# Patient Record
Sex: FEMALE | Race: WHITE | NOT HISPANIC OR LATINO | Employment: FULL TIME | URBAN - METROPOLITAN AREA
[De-identification: names, ages, dates, MRNs, and addresses within clinical notes are randomized per-mention and may not be internally consistent; named-entity substitution may affect disease eponyms.]

---

## 2017-02-08 ENCOUNTER — TELEPHONE (OUTPATIENT)
Dept: ONCOLOGY | Facility: CLINIC | Age: 52
End: 2017-02-08

## 2017-02-08 NOTE — TELEPHONE ENCOUNTER
Left message for patient to reschedule appointment with Dr Kate. Per Dr Kate, she would like patient rescheduled to March 3rd instead of Feb 17th.

## 2017-03-23 DIAGNOSIS — C50.211 MALIGNANT NEOPLASM OF UPPER-INNER QUADRANT OF RIGHT FEMALE BREAST (H): ICD-10-CM

## 2017-03-23 RX ORDER — TAMOXIFEN CITRATE 20 MG/1
20 TABLET ORAL DAILY
Qty: 90 TABLET | Refills: 3 | Status: SHIPPED | OUTPATIENT
Start: 2017-03-23 | End: 2017-07-25

## 2017-04-09 DIAGNOSIS — F41.9 ANXIETY: ICD-10-CM

## 2017-04-10 NOTE — TELEPHONE ENCOUNTER
Lexapro 25 mg      Last Written Prescription Date: 6/28/16  Last Fill Quantity: 90, # refills: 2  Last Office Visit with [facility] primary care provider:  8/23/16        Last PHQ-9 score on record=   PHQ-9 SCORE 8/23/2016   Total Score -   Total Score 0

## 2017-04-11 RX ORDER — ESCITALOPRAM OXALATE 20 MG/1
TABLET ORAL
Qty: 90 TABLET | Refills: 0 | Status: SHIPPED | OUTPATIENT
Start: 2017-04-11 | End: 2017-07-19

## 2017-04-11 NOTE — TELEPHONE ENCOUNTER
Refill approved through [facility] protocol.  Ingrid March RN  [facility]  [phone]  DX IS ANXIETY

## 2017-07-14 DIAGNOSIS — F41.9 ANXIETY: ICD-10-CM

## 2017-07-14 NOTE — TELEPHONE ENCOUNTER
Escitalopram      Last Written Prescription Date: 4/11/17  Last Fill Quantity: 90, # refills: 0  Last Office Visit with [facility] primary care provider:  8/23/16       Last PHQ-9 score on record=   PHQ-9 SCORE 8/23/2016   Total Score -   Total Score 0

## 2017-07-17 ENCOUNTER — MYC MEDICAL ADVICE (OUTPATIENT)
Dept: FAMILY MEDICINE | Facility: CLINIC | Age: 52
End: 2017-07-17

## 2017-07-17 NOTE — TELEPHONE ENCOUNTER
usues a mail order pharmcy that only take s a 3 month supply- send message that we need a local pharmacy to send a 30 day supply to - due for roshni tin August  Called cell listed someone picked up the line and then immediately hung up    /Ingrid March RN  [facility]  [phone]

## 2017-07-19 RX ORDER — ESCITALOPRAM OXALATE 20 MG/1
20 TABLET ORAL DAILY
Qty: 30 TABLET | Refills: 0 | Status: SHIPPED | OUTPATIENT
Start: 2017-07-19 | End: 2017-08-23

## 2017-07-19 RX ORDER — ESCITALOPRAM OXALATE 20 MG/1
TABLET ORAL
Qty: 30 TABLET | Refills: 0 | OUTPATIENT
Start: 2017-07-19

## 2017-07-19 NOTE — TELEPHONE ENCOUNTER
PT WANTS RX SENT TO [facility], PT STATES WOULD LIKE FILLED TODAY

## 2017-07-19 NOTE — TELEPHONE ENCOUNTER
Appointment scheduled for a physical.  30 day supply sent to [facility] per request in separate message.  Ingrid March RN  [facility]  [phone]

## 2017-07-25 ENCOUNTER — ONCOLOGY VISIT (OUTPATIENT)
Dept: ONCOLOGY | Facility: CLINIC | Age: 52
End: 2017-07-25
Attending: INTERNAL MEDICINE
Payer: COMMERCIAL

## 2017-07-25 VITALS
RESPIRATION RATE: 16 BRPM | DIASTOLIC BLOOD PRESSURE: 72 MMHG | OXYGEN SATURATION: 96 % | TEMPERATURE: 99.3 F | BODY MASS INDEX: 24.73 KG/M2 | HEART RATE: 75 BPM | SYSTOLIC BLOOD PRESSURE: 126 MMHG | WEIGHT: 148.6 LBS

## 2017-07-25 DIAGNOSIS — Z17.0 MALIGNANT NEOPLASM OF UPPER-INNER QUADRANT OF RIGHT BREAST IN FEMALE, ESTROGEN RECEPTOR POSITIVE (H): Primary | ICD-10-CM

## 2017-07-25 DIAGNOSIS — C50.211 MALIGNANT NEOPLASM OF UPPER-INNER QUADRANT OF RIGHT BREAST IN FEMALE, ESTROGEN RECEPTOR POSITIVE (H): Primary | ICD-10-CM

## 2017-07-25 DIAGNOSIS — Z12.31 VISIT FOR SCREENING MAMMOGRAM: ICD-10-CM

## 2017-07-25 PROCEDURE — 99211 OFF/OP EST MAY X REQ PHY/QHP: CPT

## 2017-07-25 PROCEDURE — 99214 OFFICE O/P EST MOD 30 MIN: CPT | Performed by: INTERNAL MEDICINE

## 2017-07-25 RX ORDER — TAMOXIFEN CITRATE 20 MG/1
20 TABLET ORAL DAILY
Qty: 90 TABLET | Refills: 3 | Status: SHIPPED | OUTPATIENT
Start: 2017-07-25 | End: 2018-07-10

## 2017-07-25 ASSESSMENT — PAIN SCALES - GENERAL: PAINLEVEL: NO PAIN (0)

## 2017-07-25 NOTE — LETTER
7/25/2017        RE: Piper Lisa  [address] [address]        [facility]    Hematology/Oncology Established Patient Note      Today's Date: 7/25/2017    Reason for Follow-up: Right invasive ductal carcinoma of breast s/p lumpectomy on 9/14/15, grade 1, 1.5 cm size tumor, negative margins, no LVI, negative lymph nodes, ER strongly positive, AK strongly positive, HER2 negative, pT1cN0, stage IA      HISTORY OF PRESENT ILLNESS: Piper Jackson is a 52 year old pre-menopausal female who presented with newly diagnosed right-sided breast cancer.  Piper underwent her regular bilateral screening mammogram on 8/18/15, where a focal asymmetry in the right upper inner breast was found.  She was not having any symptoms at the time.  She denies feeling a breast lump/bump; no rash or nipple discharge.  An ultrasound was done, which found a 1.2 cm heterogenous focal lesion at the 1:00 position, 5 cm from the nipple.       She underwent lumpectomy on 9/14/15, pathology showed grade 1, 1.5 cm size tumor, negative margins, no LVI, negative lymph nodes, ER strongly positive, AK strongly positive, HER2 negative, pT1cN0, stage IA.  Oncotype DX score is 12.  She completed radiation treatment 10/19/15-12/1/15.  She started tamoxifen on 12/5/15.      INTERIM HISTORY: Piper comes in for follow-up today.  She says that she feels very well.  She denies having any new problems.  She continues to take tamoxifen, and the hot flashes have subsided for the most part.  She still gets periods, but have been irregular, so may be maury-menopausal.  She denies any new breast lumps/bumps or new pains.        REVIEW OF SYSTEMS:   14 point ROS was reviewed and is negative other than as noted above in HPI.       HOME MEDICATIONS:  Current Outpatient Prescriptions   Medication Sig Dispense Refill     escitalopram (LEXAPRO) 20 MG tablet Take 1 tablet (20 mg) by mouth daily 30 tablet 0     tamoxifen  (NOLVADEX) 20 MG tablet Take 1 tablet (20 mg) by mouth daily 90 tablet 3     multivitamin, therapeutic with minerals (MULTI-VITAMIN) TABS Take 1 tablet by mouth daily       Calcium Citrate-Vitamin D (CALCIUM + D PO)        SUMAtriptan (IMITREX) 50 MG tablet Take 1 tablet (50 mg) by mouth at onset of headache for migraine May repeat dose in 2 hours if needed, max daily dose is 200 mg 10 tablet 5     escitalopram (LEXAPRO) 20 MG tablet TAKE ONE TABLET BY MOUTH EVERY DAY 90 tablet 2         ALLERGIES:  Allergies   Allergen Reactions     No Known Allergies      No Clinical Screening - See Comments Rash     metal         PAST MEDICAL HISTORY:  Past Medical History:   Diagnosis Date     Generalized anxiety disorder 2002     Hirsutism     facial hair     Migraine          PAST SURGICAL HISTORY:  Past Surgical History:   Procedure Laterality Date     ARTHROSCOPY KNEE RT/LT  2007    right ,degenerative changes joint and meniscus     AS EXC MALIG SKIN LESION TRUNK/ARM/LEG 0.6-1.0 CM      melanoma     BIOPSY NODE SENTINEL Right 9/14/2015    Procedure: BIOPSY NODE SENTINEL;  Surgeon: Eliezer Bates MD;  Location: [facility]     COLONOSCOPY N/A 10/14/2016    Procedure: COLONOSCOPY;  Surgeon: Eliezer Bates MD;  Location:  GI      KNEE SCOPE, DIAGNOSTIC  1985    right, medial meniscus injury     LUMPECTOMY BREAST WITH SEED LOCALIZATION Right 9/14/2015    Procedure: LUMPECTOMY BREAST WITH SEED LOCALIZATION;  Surgeon: Eliezer Bates MD;  Location: [facility]         SOCIAL HISTORY:  Social History     Social History     Marital status:      Spouse name: Jonah     Number of children: 0     Years of education: 18     Occupational History      [employer]     masters in social work     Social History Main Topics     Smoking status: Never Smoker     Smokeless tobacco: Never Used     Alcohol use 1.2 oz/week     2 Standard drinks or equivalent per week      Comment: occasional      Drug use:  "No     Sexual activity: Yes     Partners: Male      Comment: same relationship since , 3 partners lifelong     Other Topics Concern      Service No     Blood Transfusions No     Caffeine Concern No     3 a week     Occupational Exposure No     Hobby Hazards No     Sleep Concern No     6 hrs     Stress Concern No     low     Weight Concern No     Special Diet No     calcium/ high milk intake 2-3 glasses per day     Back Care Yes     lower back aches     Exercise Yes     running/weights 50-60 min 5-6 days per week     Bike Helmet Yes     Seat Belt Yes     Self-Exams No     Social History Narrative    Lives with .  Desires no children. Has a dog.  She works as a .         FAMILY HISTORY:  Family History   Problem Relation Age of Onset     CANCER Father      ocular melanoma,  of metastatic diseas age 81     Hypertension Father      Lipids Father      Prostate Cancer Father      onset age 70     GASTROINTESTINAL DISEASE Father      \"sensitive stomach\"     Skin Cancer Father      GASTROINTESTINAL DISEASE Mother      cholecystectomy     Eye Disorder Mother      cataract, macular degeneration     Hypertension Brother      C.A.D. Maternal Grandmother       of MI age 83     C.A.D. Maternal Grandfather       MI early 60's     C.A.D. Paternal Grandfather       early 50s MI     Breast Cancer Paternal Grandmother       mid 70s         PHYSICAL EXAM:  Vital signs:  /72 (BP Location: Right arm, Patient Position: Sitting, Cuff Size: Adult Regular)  Pulse 75  Temp 99.3  F (37.4  C) (Oral)  Resp 16  Wt 67.4 kg (148 lb 9.6 oz)  SpO2 96%  BMI 24.73 kg/m2   ECO  GENERAL/CONSTITUTIONAL: No acute distress.  EYES: No scleral icterus.  LYMPH: No anterior cervical, posterior cervical, supraclavicular, or axillary adenopathy.   RESPIRATORY: Clear to auscultation bilaterally. No crackles or wheezing.   CARDIOVASCULAR: Regular rate and rhythm without murmurs, gallops, or " rubs.  GASTROINTESTINAL: No tenderness. The patient has normal bowel sounds. No guarding.  No distention.  BREAST: Right-s/p lumpectomy with slight firmness of scar tissue around the site; otherwise, no palpable mass, discharge, rash, or axillary lymphadenopathy;  Left-no palpable mass, discharge, rash, or axillary lymphadenopathy.   MUSCULOSKELETAL: Warm and well-perfused, no cyanosis, clubbing, or edema.  NEUROLOGIC: Alert, oriented, answers questions appropriately.  INTEGUMENTARY: No rashes or jaundice.  GAIT: Steady, does not use assistive device      LABS:  None today.      IMAGING:  Bilateral mammogram 8/23/16:  BREAST DENSITY: Heterogeneously dense.     COMMENTS: Breast conservation therapy changes are seen in the right  breast.  No concerning findings in either breast.                                                                       IMPRESSION: BI-RADS CATEGORY: 2 - Benign Finding(s).      ASSESSMENT/PLAN:  Piper Jackson is a 52 year old pre-menopausal, otherwise healthy, female:    1) Invasive ductal carcinoma of right upper inner breast: s/p lumpectomy on 9/14/15, pathology showed grade 1, 1.5 cm size tumor, negative margins, no LVI, negative lymph nodes, ER strongly positive, ND strongly positive, HER2 negative, pT1cN0, stage IA. Oncotype DX score is 12.  She completed radiation 10/19/15-12/1/15.  She started tamoxifen on 12/5/15.      There has been no evidence of recurrence on exam.  She is tolerating tamoxifen well and will continue.  She is due for her mammogram next month.    -Continue tamoxifen - plan to treat for 10 years, if can tolerate  -RTC in 6 months  -next bilateral screening mammogram in August 2017 - ordered today    2) Melanoma in situ: s/p excision at left lateral lower leg  -continue follow-up with dermatology    3) Colon screening: She had screening colonoscopy 10/14/16.  It was normal.  Next colonoscopy was recommended for 10 year from then.      I spent a total of 25 minutes  "with the patient, with over >50% of the time in counseling and/or coordination of care.      Fabiola Kate MD  Hematology/Oncology  [facility]        Oncology Rooming Note    July 25, 2017 8:41 AM   Piper Lisa is a 52 year old female who presents for:    Chief Complaint   Patient presents with     Oncology Clinic Visit     History of melanoma in situ     Initial Vitals: /72 (BP Location: Right arm, Patient Position: Sitting, Cuff Size: Adult Regular)  Pulse 75  Temp 99.3  F (37.4  C) (Oral)  Resp 16  Wt 67.4 kg (148 lb 9.6 oz)  SpO2 96%  BMI 24.73 kg/m2 Estimated body mass index is 24.73 kg/(m^2) as calculated from the following:    Height as of 10/14/16: 1.651 m (5' 5\").    Weight as of this encounter: 67.4 kg (148 lb 9.6 oz). Body surface area is 1.76 meters squared.  No Pain (0) Comment: Data Unavailable   No LMP recorded.  Allergies reviewed: Yes  Medications reviewed: Yes    Medications: MEDICATION REFILL NEEDED ON TAMOXIFEN    Pharmacy name entered into [facility]:    [facility] - [place], [place] - [facility] [address]     Clinical concerns: None          5 minutes for nursing intake (face to face time)     Lilliam Schmid MA                Sincerely,        Fabiola Kate MD    "

## 2017-07-25 NOTE — PROGRESS NOTES
"Oncology Rooming Note    July 25, 2017 8:41 AM   Piper Lisa is a 52 year old female who presents for:    Chief Complaint   Patient presents with     Oncology Clinic Visit     History of melanoma in situ     Initial Vitals: /72 (BP Location: Right arm, Patient Position: Sitting, Cuff Size: Adult Regular)  Pulse 75  Temp 99.3  F (37.4  C) (Oral)  Resp 16  Wt 67.4 kg (148 lb 9.6 oz)  SpO2 96%  BMI 24.73 kg/m2 Estimated body mass index is 24.73 kg/(m^2) as calculated from the following:    Height as of 10/14/16: 1.651 m (5' 5\").    Weight as of this encounter: 67.4 kg (148 lb 9.6 oz). Body surface area is 1.76 meters squared.  No Pain (0) Comment: Data Unavailable   No LMP recorded.  Allergies reviewed: Yes  Medications reviewed: Yes    Medications: MEDICATION REFILL NEEDED ON TAMOXIFEN    Pharmacy name entered into [facility]:    [facility] - [place], [place] - [facility] [address]    Clinical concerns: None          5 minutes for nursing intake (face to face time)     Lilliam Schmid MA    DISCHARGE PLAN:  Next appointments: See patient instruction section.  Brought the patient to the [facility] for scheduling.  Departure Mode: Ambulatory  Accompanied by: self  4 minutes for nursing discharge (face to face time)   Lilliam Schmid MA    "

## 2017-07-25 NOTE — MR AVS SNAPSHOT
"              After Visit Summary   7/25/2017    Piper Lisa    MRN: [id number]           Patient Information     Date Of Birth          1965        Visit Information        Provider Department      7/25/2017 8:30 AM Fabiola Kate MD [facility]        Today's Diagnoses     Malignant neoplasm of upper-inner quadrant of right breast in female, estrogen receptor positive (H)    -  1    Visit for screening mammogram          Care Instructions    -schedule mammogram in August 2017  -continue tamoxifen- ADITI Gallo sent Rx to the [place] mail order pharmacy  -return to clinic in 6 months          Follow-ups after your visit        Your next 10 appointments already scheduled     Aug 23, 2017  9:20 AM CDT   PHYSICAL with Maribel Serna MD   [facility] ([facility])    [address] [place] [id number] [phone]            Aug 25, 2017 10:00 AM CDT   MA SCREENING DIGITAL BILATERAL with [id number]   [facility] [place] ([facility])    [address] [place] [address], [address] [id number] [phone]           Do not use any powder, lotion or deodorant under your arms or on your breast. If you do, we will ask you to remove it before your exam.  Wear comfortable, two-piece clothing.  If you have any allergies, tell your care team.  Bring any previous mammograms from other facilities or have them mailed to the breast center. Three-dimensional (3D) mammograms are available at [place] locations in [facility], and [place]. [facility] locations include [place] and [facility] in [place]. Benefits of 3D mammograms include: - Improved rate of cancer detection - Decreases your chance of having to go back for more tests, which means fewer: - \"False-positive\" results (This means that there is an abnormal area but it isn't cancer.) - Invasive testing " procedures, such as a biopsy or surgery - Can provide clearer images of the breast if you have dense breast tissue. 3D mammography is an optional exam that anyone can have with a 2D mammogram. It doesn't replace or take the place of a 2D mammogram. 2D mammograms remain an effective screening test for all women.  Not all insurance companies cover the cost of a 3D mammogram. Check with your insurance.            Jan 12, 2018  2:30 PM CST   Return Visit with Fabiola Kate MD   [facility] ([facility])    [facility]  [address]  [facility] [id number] [phone]              Future tests that were ordered for you today     Open Future Orders        Priority Expected Expires Ordered    *MA Screening Digital Bilateral Routine 8/24/2017 7/25/2018 7/25/2017            Who to contact     If you have questions or need follow up information about today's clinic visit or your schedule please contact [facility] directly at [phone].  Normal or non-critical lab and imaging results will be communicated to you by [employer], letter or phone within 4 business days after the clinic has received the results. If you do not hear from us within 7 days, please contact the clinic through [employer] or phone. If you have a critical or abnormal lab result, we will notify you by phone as soon as possible.  Submit refill requests through [employer] or call your pharmacy and they will forward the refill request to us. Please allow 3 business days for your refill to be completed.          Additional Information About Your Visit        [employer] Information     [employer] gives you secure access to your electronic health record. If you see a primary care provider, you can also send messages to your care team and make appointments. If you have questions, please call your primary care clinic.  If you do not have a primary care provider, please call [phone] and they  will assist you.        Care EveryWhere ID     This is your Care EveryWhere ID. This could be used by other organizations to access your [place] medical records  [id number]        Your Vitals Were     Pulse Temperature Respirations Pulse Oximetry BMI (Body Mass Index)       75 99.3  F (37.4  C) (Oral) 16 96% 24.73 kg/m2        Blood Pressure from Last 3 Encounters:   07/25/17 126/72   10/14/16 99/64   10/14/16 110/74    Weight from Last 3 Encounters:   07/25/17 67.4 kg (148 lb 9.6 oz)   10/14/16 65.8 kg (145 lb)   10/14/16 68 kg (150 lb)                 Today's Medication Changes          These changes are accurate as of: 7/25/17  9:21 AM.  If you have any questions, ask your nurse or doctor.               Stop taking these medicines if you haven't already. Please contact your care team if you have questions.     vitamin D 1000 UNITS capsule                Where to get your medicines      These medications were sent to [facility] - [place], [place] - [address] [place] [address], [facility] [id number]    Hours:  Mon-Fri 8:30am-5:00pm Toll Free [phone] Phone:  [phone]     tamoxifen 20 MG tablet                Primary Care Provider Office Phone # Fax #    Maribel Serna [phone]       [facility] [place] [address] [place] [address] [address]  [place] [address]        Equal Access to Services     YEMI KRUEGER AH: Hadii pelon ku hadasho Soomaali, waaxda luqadaha, qaybta kaalmada adeegyada, bam miller. So [facility] [phone].    ATENCIÓN: Si habla español, tiene a salazar disposición servicios gratuitos de asistencia lingüística. Llame al [phone].    We comply with applicable federal civil rights laws and [place] laws. We do not discriminate on the basis of race, color, national origin, age, disability sex, sexual orientation or gender identity.            Thank you!     Thank you for choosing [facility]  for your  care. Our goal is always to provide you with excellent care. Hearing back from our patients is one way we can continue to improve our services. Please take a few minutes to complete the written survey that you may receive in the mail after your visit with us. Thank you!             Your Updated Medication List - Protect others around you: Learn how to safely use, store and throw away your medicines at www.disposemymeds.org.          This list is accurate as of: 7/25/17  9:21 AM.  Always use your most recent med list.                   Brand Name Dispense Instructions for use Diagnosis    CALCIUM + D PO           * escitalopram 20 MG tablet    LEXAPRO    90 tablet    TAKE ONE TABLET BY MOUTH EVERY DAY    Generalized anxiety disorder       * escitalopram 20 MG tablet    LEXAPRO    30 tablet    Take 1 tablet (20 mg) by mouth daily    Anxiety       Multi-vitamin Tabs tablet      Take 1 tablet by mouth daily        SUMAtriptan 50 MG tablet    IMITREX    10 tablet    Take 1 tablet (50 mg) by mouth at onset of headache for migraine May repeat dose in 2 hours if needed, max daily dose is 200 mg    Migraine without aura and without status migrainosus, not intractable       tamoxifen 20 MG tablet    NOLVADEX    90 tablet    Take 1 tablet (20 mg) by mouth daily    Malignant neoplasm of upper-inner quadrant of right breast in female, estrogen receptor positive (H)       * Notice:  This list has 2 medication(s) that are the same as other medications prescribed for you. Read the directions carefully, and ask your doctor or other care provider to review them with you.

## 2017-07-25 NOTE — PROGRESS NOTES
[facility]    Hematology/Oncology Established Patient Note      Today's Date: 7/25/2017    Reason for Follow-up: Right invasive ductal carcinoma of breast s/p lumpectomy on 9/14/15, grade 1, 1.5 cm size tumor, negative margins, no LVI, negative lymph nodes, ER strongly positive, MI strongly positive, HER2 negative, pT1cN0, stage IA      HISTORY OF PRESENT ILLNESS: Piper Jackson is a 52 year old pre-menopausal female who presented with newly diagnosed right-sided breast cancer.  Piper underwent her regular bilateral screening mammogram on 8/18/15, where a focal asymmetry in the right upper inner breast was found.  She was not having any symptoms at the time.  She denies feeling a breast lump/bump; no rash or nipple discharge.  An ultrasound was done, which found a 1.2 cm heterogenous focal lesion at the 1:00 position, 5 cm from the nipple.       She underwent lumpectomy on 9/14/15, pathology showed grade 1, 1.5 cm size tumor, negative margins, no LVI, negative lymph nodes, ER strongly positive, MI strongly positive, HER2 negative, pT1cN0, stage IA.  Oncotype DX score is 12.  She completed radiation treatment 10/19/15-12/1/15.  She started tamoxifen on 12/5/15.      INTERIM HISTORY: Piper comes in for follow-up today.  She says that she feels very well.  She denies having any new problems.  She continues to take tamoxifen, and the hot flashes have subsided for the most part.  She still gets periods, but have been irregular, so may be maury-menopausal.  She denies any new breast lumps/bumps or new pains.        REVIEW OF SYSTEMS:   14 point ROS was reviewed and is negative other than as noted above in HPI.       HOME MEDICATIONS:  Current Outpatient Prescriptions   Medication Sig Dispense Refill     escitalopram (LEXAPRO) 20 MG tablet Take 1 tablet (20 mg) by mouth daily 30 tablet 0     tamoxifen (NOLVADEX) 20 MG tablet Take 1 tablet (20 mg) by mouth daily 90 tablet 3     multivitamin, therapeutic  with minerals (MULTI-VITAMIN) TABS Take 1 tablet by mouth daily       Calcium Citrate-Vitamin D (CALCIUM + D PO)        SUMAtriptan (IMITREX) 50 MG tablet Take 1 tablet (50 mg) by mouth at onset of headache for migraine May repeat dose in 2 hours if needed, max daily dose is 200 mg 10 tablet 5     escitalopram (LEXAPRO) 20 MG tablet TAKE ONE TABLET BY MOUTH EVERY DAY 90 tablet 2         ALLERGIES:  Allergies   Allergen Reactions     No Known Allergies      No Clinical Screening - See Comments Rash     metal         PAST MEDICAL HISTORY:  Past Medical History:   Diagnosis Date     Generalized anxiety disorder 2002     Hirsutism     facial hair     Migraine          PAST SURGICAL HISTORY:  Past Surgical History:   Procedure Laterality Date     ARTHROSCOPY KNEE RT/LT  2007    right ,degenerative changes joint and meniscus     AS EXC MALIG SKIN LESION TRUNK/ARM/LEG 0.6-1.0 CM      melanoma     BIOPSY NODE SENTINEL Right 9/14/2015    Procedure: BIOPSY NODE SENTINEL;  Surgeon: Eliezer Bates MD;  Location: [facility]     COLONOSCOPY N/A 10/14/2016    Procedure: COLONOSCOPY;  Surgeon: Eliezer Bates MD;  Location:  GI      KNEE SCOPE, DIAGNOSTIC  1985    right, medial meniscus injury     LUMPECTOMY BREAST WITH SEED LOCALIZATION Right 9/14/2015    Procedure: LUMPECTOMY BREAST WITH SEED LOCALIZATION;  Surgeon: Eliezer Bates MD;  Location: [facility]         SOCIAL HISTORY:  Social History     Social History     Marital status:      Spouse name: Jonah     Number of children: 0     Years of education: 18     Occupational History      [employer]     masters in social work     Social History Main Topics     Smoking status: Never Smoker     Smokeless tobacco: Never Used     Alcohol use 1.2 oz/week     2 Standard drinks or equivalent per week      Comment: occasional      Drug use: No     Sexual activity: Yes     Partners: Male      Comment: same relationship since 2000, 3 partners  "lifelong     Other Topics Concern      Service No     Blood Transfusions No     Caffeine Concern No     3 a week     Occupational Exposure No     Hobby Hazards No     Sleep Concern No     6 hrs     Stress Concern No     low     Weight Concern No     Special Diet No     calcium/ high milk intake 2-3 glasses per day     Back Care Yes     lower back aches     Exercise Yes     running/weights 50-60 min 5-6 days per week     Bike Helmet Yes     Seat Belt Yes     Self-Exams No     Social History Narrative    Lives with .  Desires no children. Has a dog.  She works as a .         FAMILY HISTORY:  Family History   Problem Relation Age of Onset     CANCER Father      ocular melanoma,  of metastatic diseas age 81     Hypertension Father      Lipids Father      Prostate Cancer Father      onset age 70     GASTROINTESTINAL DISEASE Father      \"sensitive stomach\"     Skin Cancer Father      GASTROINTESTINAL DISEASE Mother      cholecystectomy     Eye Disorder Mother      cataract, macular degeneration     Hypertension Brother      C.A.D. Maternal Grandmother       of MI age 83     C.A.D. Maternal Grandfather       MI early 60's     C.A.D. Paternal Grandfather       early 50s MI     Breast Cancer Paternal Grandmother       mid 70s         PHYSICAL EXAM:  Vital signs:  /72 (BP Location: Right arm, Patient Position: Sitting, Cuff Size: Adult Regular)  Pulse 75  Temp 99.3  F (37.4  C) (Oral)  Resp 16  Wt 67.4 kg (148 lb 9.6 oz)  SpO2 96%  BMI 24.73 kg/m2   ECO  GENERAL/CONSTITUTIONAL: No acute distress.  EYES: No scleral icterus.  LYMPH: No anterior cervical, posterior cervical, supraclavicular, or axillary adenopathy.   RESPIRATORY: Clear to auscultation bilaterally. No crackles or wheezing.   CARDIOVASCULAR: Regular rate and rhythm without murmurs, gallops, or rubs.  GASTROINTESTINAL: No tenderness. The patient has normal bowel sounds. No guarding.  No " distention.  BREAST: Right-s/p lumpectomy with slight firmness of scar tissue around the site; otherwise, no palpable mass, discharge, rash, or axillary lymphadenopathy;  Left-no palpable mass, discharge, rash, or axillary lymphadenopathy.   MUSCULOSKELETAL: Warm and well-perfused, no cyanosis, clubbing, or edema.  NEUROLOGIC: Alert, oriented, answers questions appropriately.  INTEGUMENTARY: No rashes or jaundice.  GAIT: Steady, does not use assistive device      LABS:  None today.      IMAGING:  Bilateral mammogram 8/23/16:  BREAST DENSITY: Heterogeneously dense.     COMMENTS: Breast conservation therapy changes are seen in the right  breast.  No concerning findings in either breast.                                                                       IMPRESSION: BI-RADS CATEGORY: 2 - Benign Finding(s).      ASSESSMENT/PLAN:  Piper Jackson is a 52 year old pre-menopausal, otherwise healthy, female:    1) Invasive ductal carcinoma of right upper inner breast: s/p lumpectomy on 9/14/15, pathology showed grade 1, 1.5 cm size tumor, negative margins, no LVI, negative lymph nodes, ER strongly positive, MA strongly positive, HER2 negative, pT1cN0, stage IA. Oncotype DX score is 12.  She completed radiation 10/19/15-12/1/15.  She started tamoxifen on 12/5/15.      There has been no evidence of recurrence on exam.  She is tolerating tamoxifen well and will continue.  She is due for her mammogram next month.    -Continue tamoxifen - plan to treat for 10 years, if can tolerate  -RTC in 6 months  -next bilateral screening mammogram in August 2017 - ordered today    2) Melanoma in situ: s/p excision at left lateral lower leg  -continue follow-up with dermatology    3) Colon screening: She had screening colonoscopy 10/14/16.  It was normal.  Next colonoscopy was recommended for 10 year from then.      I spent a total of 25 minutes with the patient, with over >50% of the time in counseling and/or coordination of  care.      Fabiola Kate MD  Hematology/Oncology  [facility]

## 2017-07-25 NOTE — PATIENT INSTRUCTIONS
-schedule mammogram in August 2017  Scheduled/janice  -continue tamoxifen- ADITI Gallo sent Rx to the [facility] mail order pharmacy  -return to clinic in 6 months  Scheduled/janice      AVS printed & given to patient/Lili

## 2017-08-23 ENCOUNTER — OFFICE VISIT (OUTPATIENT)
Dept: FAMILY MEDICINE | Facility: CLINIC | Age: 52
End: 2017-08-23
Payer: COMMERCIAL

## 2017-08-23 VITALS
DIASTOLIC BLOOD PRESSURE: 62 MMHG | WEIGHT: 152.6 LBS | HEART RATE: 76 BPM | HEIGHT: 65 IN | OXYGEN SATURATION: 98 % | SYSTOLIC BLOOD PRESSURE: 110 MMHG | TEMPERATURE: 98.4 F | BODY MASS INDEX: 25.43 KG/M2

## 2017-08-23 DIAGNOSIS — F41.1 GENERALIZED ANXIETY DISORDER: ICD-10-CM

## 2017-08-23 DIAGNOSIS — G43.009 MIGRAINE WITHOUT AURA AND WITHOUT STATUS MIGRAINOSUS, NOT INTRACTABLE: ICD-10-CM

## 2017-08-23 DIAGNOSIS — Z23 NEED FOR VACCINATION: ICD-10-CM

## 2017-08-23 DIAGNOSIS — Z00.00 ROUTINE GENERAL MEDICAL EXAMINATION AT A HEALTH CARE FACILITY: Primary | ICD-10-CM

## 2017-08-23 LAB
ALBUMIN SERPL-MCNC: 3.8 G/DL (ref 3.4–5)
ALP SERPL-CCNC: 50 U/L (ref 40–150)
ALT SERPL W P-5'-P-CCNC: 28 U/L (ref 0–50)
ANION GAP SERPL CALCULATED.3IONS-SCNC: 9 MMOL/L (ref 3–14)
AST SERPL W P-5'-P-CCNC: 23 U/L (ref 0–45)
BILIRUB SERPL-MCNC: 0.3 MG/DL (ref 0.2–1.3)
BUN SERPL-MCNC: 14 MG/DL (ref 7–30)
CALCIUM SERPL-MCNC: 9.3 MG/DL (ref 8.5–10.1)
CHLORIDE SERPL-SCNC: 105 MMOL/L (ref 94–109)
CHOLEST SERPL-MCNC: 182 MG/DL
CO2 SERPL-SCNC: 27 MMOL/L (ref 20–32)
CREAT SERPL-MCNC: 0.97 MG/DL (ref 0.52–1.04)
GFR SERPL CREATININE-BSD FRML MDRD: 60 ML/MIN/1.7M2
GLUCOSE SERPL-MCNC: 78 MG/DL (ref 70–99)
HDLC SERPL-MCNC: 75 MG/DL
HGB BLD-MCNC: 12.8 G/DL (ref 11.7–15.7)
LDLC SERPL CALC-MCNC: 84 MG/DL
NONHDLC SERPL-MCNC: 107 MG/DL
POTASSIUM SERPL-SCNC: 4.2 MMOL/L (ref 3.4–5.3)
PROT SERPL-MCNC: 7.3 G/DL (ref 6.8–8.8)
SODIUM SERPL-SCNC: 141 MMOL/L (ref 133–144)
TRIGL SERPL-MCNC: 117 MG/DL
TSH SERPL DL<=0.005 MIU/L-ACNC: 1 MU/L (ref 0.4–4)

## 2017-08-23 PROCEDURE — 87624 HPV HI-RISK TYP POOLED RSLT: CPT | Performed by: FAMILY MEDICINE

## 2017-08-23 PROCEDURE — 90714 TD VACC NO PRESV 7 YRS+ IM: CPT | Performed by: FAMILY MEDICINE

## 2017-08-23 PROCEDURE — 80061 LIPID PANEL: CPT | Performed by: FAMILY MEDICINE

## 2017-08-23 PROCEDURE — 36415 COLL VENOUS BLD VENIPUNCTURE: CPT | Performed by: FAMILY MEDICINE

## 2017-08-23 PROCEDURE — 85018 HEMOGLOBIN: CPT | Performed by: FAMILY MEDICINE

## 2017-08-23 PROCEDURE — G0145 SCR C/V CYTO,THINLAYER,RESCR: HCPCS | Performed by: FAMILY MEDICINE

## 2017-08-23 PROCEDURE — 80053 COMPREHEN METABOLIC PANEL: CPT | Performed by: FAMILY MEDICINE

## 2017-08-23 PROCEDURE — 84443 ASSAY THYROID STIM HORMONE: CPT | Performed by: FAMILY MEDICINE

## 2017-08-23 PROCEDURE — 90471 IMMUNIZATION ADMIN: CPT | Performed by: FAMILY MEDICINE

## 2017-08-23 PROCEDURE — 99396 PREV VISIT EST AGE 40-64: CPT | Mod: 25 | Performed by: FAMILY MEDICINE

## 2017-08-23 RX ORDER — SUMATRIPTAN 50 MG/1
50 TABLET, FILM COATED ORAL
Qty: 10 TABLET | Refills: 5 | Status: SHIPPED | OUTPATIENT
Start: 2017-08-23 | End: 2019-02-12

## 2017-08-23 RX ORDER — ESCITALOPRAM OXALATE 10 MG/1
10 TABLET ORAL DAILY
Qty: 90 TABLET | Refills: 1 | Status: SHIPPED | OUTPATIENT
Start: 2017-08-23 | End: 2018-07-31

## 2017-08-23 RX ORDER — ESCITALOPRAM OXALATE 20 MG/1
20 TABLET ORAL DAILY
Qty: 90 TABLET | Refills: 1 | Status: SHIPPED | OUTPATIENT
Start: 2017-08-23 | End: 2017-08-23

## 2017-08-23 ASSESSMENT — ANXIETY QUESTIONNAIRES
IF YOU CHECKED OFF ANY PROBLEMS ON THIS QUESTIONNAIRE, HOW DIFFICULT HAVE THESE PROBLEMS MADE IT FOR YOU TO DO YOUR WORK, TAKE CARE OF THINGS AT HOME, OR GET ALONG WITH OTHER PEOPLE: NOT DIFFICULT AT ALL
2. NOT BEING ABLE TO STOP OR CONTROL WORRYING: NOT AT ALL
3. WORRYING TOO MUCH ABOUT DIFFERENT THINGS: NOT AT ALL
7. FEELING AFRAID AS IF SOMETHING AWFUL MIGHT HAPPEN: NOT AT ALL
GAD7 TOTAL SCORE: 0
5. BEING SO RESTLESS THAT IT IS HARD TO SIT STILL: NOT AT ALL
6. BECOMING EASILY ANNOYED OR IRRITABLE: NOT AT ALL
1. FEELING NERVOUS, ANXIOUS, OR ON EDGE: NOT AT ALL

## 2017-08-23 ASSESSMENT — PATIENT HEALTH QUESTIONNAIRE - PHQ9
5. POOR APPETITE OR OVEREATING: NOT AT ALL
SUM OF ALL RESPONSES TO PHQ QUESTIONS 1-9: 1

## 2017-08-23 NOTE — PROGRESS NOTES
SUBJECTIVE:   CC: Piper Lisa is an 52 year old woman who presents for preventive health visit.     Healthy Habits:    Do you get at least three servings of calcium containing foods daily (dairy, green leafy vegetables, etc.)? yes    Amount of exercise or daily activities, outside of work: 5-7 day(s) per week    Problems taking medications regularly No    Medication side effects: No    Have you had an eye exam in the past two years? no    Do you see a dentist twice per year? yes    Do you have sleep apnea, excessive snoring or daytime drowsiness?no        Anxiety Follow-Up    Status since last visit: Improved     Other associated symptoms:None    Complicating factors:   Significant life event: No   Current substance abuse: None  Depression symptoms: No  DAVID-7 SCORE 12/23/2015 8/23/2016 8/23/2017   Total Score - - -   Total Score 3 0 0       GAD7          Migraine Follow-Up    Headaches symptoms:  Stable     Frequency: rare       Able to do normal daily activities/work with migraines: Yes    Rescue/Relief medication:sumatriptan (Imitrex)              Effectiveness: moderate relief    Preventative medication: None    Neurologic complications: No new stroke-like symptoms, loss of vision or speech, numbness or weakness    In the past 4 weeks, how often have you gone to Urgent Care or the emergency room because of your headaches?  0          Today's PHQ-2 Score: PHQ-2 ( 1999 Pfizer) 8/23/2016 12/23/2015   Q1: Little interest or pleasure in doing things 0 0   Q2: Feeling down, depressed or hopeless 0 0   PHQ-2 Score 0 0       Abuse: Current or Past(Physical, Sexual or Emotional)- No  Do you feel safe in your environment - Yes    Social History   Substance Use Topics     Smoking status: Never Smoker     Smokeless tobacco: Never Used     Alcohol use 1.2 oz/week     2 Standard drinks or equivalent per week      Comment: occasional      The patient does not drink >3 drinks per day nor >7 drinks per week.    Reviewed  "orders with patient.  Reviewed health maintenance and updated orders accordingly - Yes  Labs reviewed in EPIC  Patient Active Problem List   Diagnosis     Hirsutism     Generalized anxiety disorder     Migraine     CARDIOVASCULAR SCREENING; LDL GOAL LESS THAN 160     Lentigo maligna (H)     Malignant neoplasm of upper-inner quadrant of right female breast (H)     History of melanoma in situ     Past Surgical History:   Procedure Laterality Date     ARTHROSCOPY KNEE RT/LT      right ,degenerative changes joint and meniscus     AS EXC MALIG SKIN LESION TRUNK/ARM/LEG 0.6-1.0 CM      melanoma     BIOPSY NODE SENTINEL Right 2015    Procedure: BIOPSY NODE SENTINEL;  Surgeon: Eliezer Bates MD;  Location: [facility]     COLONOSCOPY N/A 10/14/2016    Procedure: COLONOSCOPY;  Surgeon: Elieezr Bates MD;  Location:  GI      KNEE SCOPE, DIAGNOSTIC      right, medial meniscus injury     LUMPECTOMY BREAST WITH SEED LOCALIZATION Right 2015    Procedure: LUMPECTOMY BREAST WITH SEED LOCALIZATION;  Surgeon: Eliezer Bates MD;  Location: [facility]       Social History   Substance Use Topics     Smoking status: Never Smoker     Smokeless tobacco: Never Used     Alcohol use 1.2 oz/week     2 Standard drinks or equivalent per week      Comment: occasional      Family History   Problem Relation Age of Onset     CANCER Father      ocular melanoma,  of metastatic diseas age 81     Hypertension Father      Lipids Father      Prostate Cancer Father      onset age 70     GASTROINTESTINAL DISEASE Father      \"sensitive stomach\"     Skin Cancer Father      GASTROINTESTINAL DISEASE Mother      cholecystectomy     Eye Disorder Mother      cataract, macular degeneration     CEREBROVASCULAR DISEASE Mother 85     C.A.D. Maternal Grandmother       of MI age 83     C.A.D. Maternal Grandfather       MI early 60's     C.A.D. Paternal Grandfather       early 50s MI     Breast Cancer Paternal Grandmother  "      mid 70s     Hypertension Brother          Current Outpatient Prescriptions   Medication Sig Dispense Refill     SUMAtriptan (IMITREX) 50 MG tablet Take 1 tablet (50 mg) by mouth at onset of headache for migraine May repeat dose in 2 hours if needed, max daily dose is 200 mg 10 tablet 5     escitalopram (LEXAPRO) 10 MG tablet Take 1 tablet (10 mg) by mouth daily 90 tablet 1     tamoxifen (NOLVADEX) 20 MG tablet Take 1 tablet (20 mg) by mouth daily 90 tablet 3     multivitamin, therapeutic with minerals (MULTI-VITAMIN) TABS Take 1 tablet by mouth daily       Calcium Citrate-Vitamin D (CALCIUM + D PO)        [DISCONTINUED] escitalopram (LEXAPRO) 20 MG tablet Take 1 tablet (20 mg) by mouth daily 90 tablet 1     [DISCONTINUED] escitalopram (LEXAPRO) 20 MG tablet Take 1 tablet (20 mg) by mouth daily (Patient not taking: Reported on 2017) 30 tablet 0     [DISCONTINUED] SUMAtriptan (IMITREX) 50 MG tablet Take 1 tablet (50 mg) by mouth at onset of headache for migraine May repeat dose in 2 hours if needed, max daily dose is 200 mg 10 tablet 5     [DISCONTINUED] escitalopram (LEXAPRO) 20 MG tablet TAKE ONE TABLET BY MOUTH EVERY DAY 90 tablet 2     Allergies   Allergen Reactions     No Known Allergies      No Clinical Screening - See Comments Rash     metal         Patient over age 50, mutual decision to screen reflected in health maintenance.      Pertinent mammograms are reviewed under the imaging tab.  History of abnormal Pap smear: NO - age 30- 65 PAP every 3 years recommended    Reviewed and updated as needed this visit by clinical staffTobacco  Allergies  Meds         Reviewed and updated as needed this visit by Provider              ROS:  C: NEGATIVE for fever, chills, change in weight  I: NEGATIVE for worrisome rashes, moles or lesions  E: NEGATIVE for vision changes or irritation  ENT: NEGATIVE for ear, mouth and throat problems  R: NEGATIVE for significant cough or SOB  B: NEGATIVE for masses,  "tenderness or discharge  CV: NEGATIVE for chest pain, palpitations or peripheral edema  GI: NEGATIVE for nausea, abdominal pain, heartburn, or change in bowel habits  : NEGATIVE for unusual urinary or vaginal symptoms. Periods are regular.  M: NEGATIVE for significant arthralgias or myalgia  N: NEGATIVE for weakness, dizziness or paresthesias  P: NEGATIVE for changes in mood or affect    OBJECTIVE:   /62  Pulse 76  Temp 98.4  F (36.9  C) (Tympanic)  Ht 5' 5\" (1.651 m)  Wt 152 lb 9.6 oz (69.2 kg)  LMP  (LMP Unknown)  SpO2 98%  BMI 25.39 kg/m2  EXAM:  GENERAL: healthy, alert and no distress  EYES: Eyes grossly normal to inspection, PERRL and conjunctivae and sclerae normal  HENT: ear canals and TM's normal, nose and mouth without ulcers or lesions  NECK: no adenopathy, no asymmetry, masses, or scars and thyroid normal to palpation  RESP: lungs clear to auscultation - no rales, rhonchi or wheezes  BREAST: normal without masses, tenderness or nipple discharge and no palpable axillary masses or adenopathy  CV: regular rate and rhythm, normal S1 S2, no S3 or S4, no murmur, click or rub, no peripheral edema and peripheral pulses strong  ABDOMEN: soft, nontender, no hepatosplenomegaly, no masses and bowel sounds normal   (female): normal female external genitalia, normal urethral meatus, vaginal mucosa pink, moist, well rugated, and normal cervix/adnexa/uterus without masses or discharge  MS: no gross musculoskeletal defects noted, no edema  SKIN: no suspicious lesions or rashes  NEURO: Normal strength and tone, mentation intact and speech normal  PSYCH: mentation appears normal, affect normal/bright    ASSESSMENT/PLAN:   1. Routine general medical examination at a health care facility  Screening labs and Pap smear. Patient does not recall getting a Pap smear last year even though I reviewed the last year's Pap results. Patient requested repeat a Pap smear today.    - Pap imaged thin layer screen with HPV - " "recommended age 30 - 65 years (select HPV order below)  - HPV High Risk Types DNA Cervical  - Comprehensive metabolic panel  - Hemoglobin  - TSH with free T4 reflex  - Lipid Profile    2. Generalized anxiety disorder  Symptoms are well controlled. Recommended to decrease the dose of Lexapro from 20-10 mg daily. Follow-up in 5-6 months again for recheck. If symptoms are well managed with a 10 mg dose, may further lower the dose in the next 1-2 months.    - escitalopram (LEXAPRO) 10 MG tablet; Take 1 tablet (10 mg) by mouth daily  Dispense: 90 tablet; Refill: 1    3. Migraine without aura and without status migrainosus, not intractable  Well-controlled. Imitrex reordered  - SUMAtriptan (IMITREX) 50 MG tablet; Take 1 tablet (50 mg) by mouth at onset of headache for migraine May repeat dose in 2 hours if needed, max daily dose is 200 mg  Dispense: 10 tablet; Refill: 5    4. Need for vaccination    - TD PRSERV FREE >=7 YRS ADS IM [09814]  - 1st  Administration  [[id number]]    COUNSELING:   Reviewed preventive health counseling, as reflected in patient instructions       Regular exercise       Healthy diet/nutrition         reports that she has never smoked. She has never used smokeless tobacco.    Estimated body mass index is 25.39 kg/(m^2) as calculated from the following:    Height as of this encounter: 5' 5\" (1.651 m).    Weight as of this encounter: 152 lb 9.6 oz (69.2 kg).   Weight management plan: Discussed healthy diet and exercise guidelines and patient will follow up in 12 months in clinic to re-evaluate.    Counseling Resources:  ATP IV Guidelines  Pooled Cohorts Equation Calculator  Breast Cancer Risk Calculator  FRAX Risk Assessment  ICSI Preventive Guidelines  Dietary Guidelines for Americans, 2010  USDA's MyPlate  ASA Prophylaxis  Lung CA Screening    Maribel Serna MD  [facility] [place]  "

## 2017-08-23 NOTE — NURSING NOTE
"Chief Complaint   Patient presents with     Physical     fasting       Initial /62  Pulse 76  Temp 98.4  F (36.9  C) (Tympanic)  Ht 5' 5\" (1.651 m)  Wt 152 lb 9.6 oz (69.2 kg)  LMP  (LMP Unknown)  SpO2 98%  BMI 25.39 kg/m2 Estimated body mass index is 25.39 kg/(m^2) as calculated from the following:    Height as of this encounter: 5' 5\" (1.651 m).    Weight as of this encounter: 152 lb 9.6 oz (69.2 kg).  Medication Reconciliation: complete  "

## 2017-08-23 NOTE — MR AVS SNAPSHOT
After Visit Summary   8/23/2017    Piper Lisa    MRN: [id number]           Patient Information     Date Of Birth          1965        Visit Information        Provider Department      8/23/2017 9:20 AM Maribel Serna MD [facility] [place] [facility]        Today's Diagnoses     Routine general medical examination at a health care facility    -  1    Generalized anxiety disorder        Migraine without aura and without status migrainosus, not intractable          Care Instructions      Preventive Health Recommendations  Female Ages 50 - 64    Yearly exam: See your health care provider every year in order to  o Review health changes.   o Discuss preventive care.    o Review your medicines if your doctor has prescribed any.      Get a Pap test every three years (unless you have an abnormal result and your provider advises testing more often).    If you get Pap tests with HPV test, you only need to test every 5 years, unless you have an abnormal result.     You do not need a Pap test if your uterus was removed (hysterectomy) and you have not had cancer.    You should be tested each year for STDs (sexually transmitted diseases) if you're at risk.     Have a mammogram every 1 to 2 years.    Have a colonoscopy at age 50, or have a yearly FIT test (stool test). These exams screen for colon cancer.      Have a cholesterol test every 5 years, or more often if advised.    Have a diabetes test (fasting glucose) every three years. If you are at risk for diabetes, you should have this test more often.     If you are at risk for osteoporosis (brittle bone disease), think about having a bone density scan (DEXA).    Shots: Get a flu shot each year. Get a tetanus shot every 10 years.    Nutrition:     Eat at least 5 servings of fruits and vegetables each day.    Eat whole-grain bread, whole-wheat pasta and brown rice instead of white grains and rice.    Talk to your provider about Calcium and Vitamin D.  "    Lifestyle    Exercise at least 150 minutes a week (30 minutes a day, 5 days a week). This will help you control your weight and prevent disease.    Limit alcohol to one drink per day.    No smoking.     Wear sunscreen to prevent skin cancer.     See your dentist every six months for an exam and cleaning.    See your eye doctor every 1 to 2 years.            Follow-ups after your visit        Follow-up notes from your care team     Return in about 6 months (around 2/23/2018) for mood recheck .      Your next 10 appointments already scheduled     Aug 25, 2017 10:00 AM CDT   MA SCREENING DIGITAL BILATERAL with [id number]   [facility] ([facility])    [address] [place] [address], [address]  [facility] [address] [phone]           Do not use any powder, lotion or deodorant under your arms or on your breast. If you do, we will ask you to remove it before your exam.  Wear comfortable, two-piece clothing.  If you have any allergies, tell your care team.  Bring any previous mammograms from other facilities or have them mailed to the breast center. Three-dimensional (3D) mammograms are available at [facility] locations in [facility], and [place]. [facility] locations include [place] [facility] in [place]. Benefits of 3D mammograms include: - Improved rate of cancer detection - Decreases your chance of having to go back for more tests, which means fewer: - \"False-positive\" results (This means that there is an abnormal area but it isn't cancer.) - Invasive testing procedures, such as a biopsy or surgery - Can provide clearer images of the breast if you have dense breast tissue. 3D mammography is an optional exam that anyone can have with a 2D mammogram. It doesn't replace or take the place of a 2D mammogram. 2D mammograms remain an effective screening test for all women.  Not all insurance companies cover the cost of a 3D " "mammogram. Check with your insurance.            Jan 12, 2018  2:30 PM CST   Return Visit with Fabiola Kate MD   [facility] ([facility])    [facility]  [address]  [place] [id number] [phone]              Who to contact     If you have questions or need follow up information about today's clinic visit or your schedule please contact [facility] [place] directly at [phone].  Normal or non-critical lab and imaging results will be communicated to you by Shoppablehart, letter or phone within 4 business days after the clinic has received the results. If you do not hear from us within 7 days, please contact the clinic through [email or web address] or phone. If you have a critical or abnormal lab result, we will notify you by phone as soon as possible.  Submit refill requests through Ourcast or call your pharmacy and they will forward the refill request to us. Please allow 3 business days for your refill to be completed.          Additional Information About Your Visit        Ourcast Information     Ourcast gives you secure access to your electronic health record. If you see a primary care provider, you can also send messages to your care team and make appointments. If you have questions, please call your primary care clinic.  If you do not have a primary care provider, please call [phone] and they will assist you.        Care EveryWhere ID     This is your Care EveryWhere ID. This could be used by other organizations to access your [facility] medical records  [id number]        Your Vitals Were     Pulse Temperature Height Last Period Pulse Oximetry BMI (Body Mass Index)    76 98.4  F (36.9  C) (Tympanic) 5' 5\" (1.651 m) (LMP Unknown) 98% 25.39 kg/m2       Blood Pressure from Last 3 Encounters:   08/23/17 110/62   07/25/17 126/72   10/14/16 99/64    Weight from Last 3 Encounters:   08/23/17 152 lb 9.6 oz (69.2 kg)   07/25/17 148 lb 9.6 oz " (67.4 kg)   10/14/16 145 lb (65.8 kg)              We Performed the Following     Comprehensive metabolic panel     Hemoglobin     HPV High Risk Types DNA Cervical     Lipid Profile     Pap imaged thin layer screen with HPV - recommended age 30 - 65 years (select HPV order below)     TSH with free T4 reflex          Today's Medication Changes          These changes are accurate as of: 8/23/17 10:12 AM.  If you have any questions, ask your nurse or doctor.               Start taking these medicines.        Dose/Directions    escitalopram 10 MG tablet   Commonly known as:  LEXAPRO   Used for:  Generalized anxiety disorder   Started by:  Maribel Serna MD        Dose:  10 mg   Take 1 tablet (10 mg) by mouth daily   Quantity:  90 tablet   Refills:  1            Where to get your medicines      These medications were sent to [facility] - [place], [place] - [address]   [address] [facility] [address]    Hours:  Mon-Fri 8:30am-5:00pm Toll Free [phone] Phone:  [phone]     escitalopram 10 MG tablet    SUMAtriptan 50 MG tablet                Primary Care Provider Office Phone # Fax #    Maribel Serna [phone] [address]  [place] [address]        Equal Access to Services     YEMI KRUEGER AH: Hadii pelon ku hadasho Soomaali, waaxda luqadaha, qaybta kaalmada adeegyada, waxay idiin hayjoen tigist miller. So [facility] [phone].    ATENCIÓN: Si habla español, tiene a salazar disposición servicios gratuitos de asistencia lingüística. Llame al [phone].    We comply with applicable federal civil rights laws and [place] laws. We do not discriminate on the basis of race, color, national origin, age, disability sex, sexual orientation or gender identity.            Thank you!     Thank you for choosing [facility] [place]  for your care. Our goal is always to provide you with excellent care. Hearing back from our patients is  one way we can continue to improve our services. Please take a few minutes to complete the written survey that you may receive in the mail after your visit with us. Thank you!             Your Updated Medication List - Protect others around you: Learn how to safely use, store and throw away your medicines at www.disposemymeds.org.          This list is accurate as of: 8/23/17 10:12 AM.  Always use your most recent med list.                   Brand Name Dispense Instructions for use Diagnosis    CALCIUM + D PO           escitalopram 10 MG tablet    LEXAPRO    90 tablet    Take 1 tablet (10 mg) by mouth daily    Generalized anxiety disorder       Multi-vitamin Tabs tablet      Take 1 tablet by mouth daily        SUMAtriptan 50 MG tablet    IMITREX    10 tablet    Take 1 tablet (50 mg) by mouth at onset of headache for migraine May repeat dose in 2 hours if needed, max daily dose is 200 mg    Migraine without aura and without status migrainosus, not intractable       tamoxifen 20 MG tablet    NOLVADEX    90 tablet    Take 1 tablet (20 mg) by mouth daily    Malignant neoplasm of upper-inner quadrant of right breast in female, estrogen receptor positive (H)

## 2017-08-24 ASSESSMENT — ANXIETY QUESTIONNAIRES: GAD7 TOTAL SCORE: 0

## 2017-08-25 ENCOUNTER — HOSPITAL ENCOUNTER (OUTPATIENT)
Dept: MAMMOGRAPHY | Facility: CLINIC | Age: 52
Discharge: HOME | End: 2017-08-25
Attending: INTERNAL MEDICINE | Admitting: INTERNAL MEDICINE
Payer: COMMERCIAL

## 2017-08-25 DIAGNOSIS — Z12.31 VISIT FOR SCREENING MAMMOGRAM: ICD-10-CM

## 2017-08-25 LAB
COPATH REPORT: NORMAL
PAP: NORMAL

## 2017-08-25 PROCEDURE — G0202 SCR MAMMO BI INCL CAD: HCPCS

## 2017-08-29 LAB
FINAL DIAGNOSIS: NORMAL
HPV HR 12 DNA CVX QL NAA+PROBE: NEGATIVE
HPV16 DNA SPEC QL NAA+PROBE: NEGATIVE
HPV18 DNA SPEC QL NAA+PROBE: NEGATIVE
SPECIMEN DESCRIPTION: NORMAL

## 2018-01-12 ENCOUNTER — ONCOLOGY VISIT (OUTPATIENT)
Dept: ONCOLOGY | Facility: CLINIC | Age: 53
End: 2018-01-12
Attending: INTERNAL MEDICINE
Payer: COMMERCIAL

## 2018-01-12 VITALS
SYSTOLIC BLOOD PRESSURE: 111 MMHG | HEART RATE: 77 BPM | OXYGEN SATURATION: 98 % | WEIGHT: 158 LBS | RESPIRATION RATE: 16 BRPM | DIASTOLIC BLOOD PRESSURE: 73 MMHG | BODY MASS INDEX: 26.29 KG/M2 | TEMPERATURE: 98.8 F

## 2018-01-12 DIAGNOSIS — Z12.39 BREAST SCREENING: ICD-10-CM

## 2018-01-12 DIAGNOSIS — Z17.0 MALIGNANT NEOPLASM OF UPPER-INNER QUADRANT OF RIGHT BREAST IN FEMALE, ESTROGEN RECEPTOR POSITIVE (H): Primary | ICD-10-CM

## 2018-01-12 DIAGNOSIS — C50.211 MALIGNANT NEOPLASM OF UPPER-INNER QUADRANT OF RIGHT BREAST IN FEMALE, ESTROGEN RECEPTOR POSITIVE (H): Primary | ICD-10-CM

## 2018-01-12 PROCEDURE — G0463 HOSPITAL OUTPT CLINIC VISIT: HCPCS

## 2018-01-12 PROCEDURE — 99214 OFFICE O/P EST MOD 30 MIN: CPT | Performed by: INTERNAL MEDICINE

## 2018-01-12 ASSESSMENT — PAIN SCALES - GENERAL: PAINLEVEL: NO PAIN (0)

## 2018-01-12 NOTE — PROGRESS NOTES
"Oncology Rooming Note    January 12, 2018 2:47 PM   Piper Lisa is a 52 year old female who presents for:    Chief Complaint   Patient presents with     Oncology Clinic Visit     Initial Vitals: /73 (BP Location: Left arm, Patient Position: Chair, Cuff Size: Adult Regular)  Pulse 77  Temp 98.8  F (37.1  C) (Oral)  Resp 16  Wt 71.7 kg (158 lb)  SpO2 98%  BMI 26.29 kg/m2 Estimated body mass index is 26.29 kg/(m^2) as calculated from the following:    Height as of 8/23/17: 1.651 m (5' 5\").    Weight as of this encounter: 71.7 kg (158 lb). Body surface area is 1.81 meters squared.  No Pain (0) Comment: Data Unavailable   No LMP recorded.  Allergies reviewed: Yes  Medications reviewed: Yes    Medications: Medication refills not needed today.  Pharmacy name entered into [facility]:    [id number] [address] AT [place] [address] & [facility]  [facility] - [place], [place] - [facility] [address]    Clinical concerns: None     5 minutes for nursing intake (face to face time)     Kristine Maher CMA              "

## 2018-01-12 NOTE — PROGRESS NOTES
[facility]    Hematology/Oncology Established Patient Note      Today's Date: 1/12/2018    Reason for Follow-up: Right invasive ductal carcinoma of breast s/p lumpectomy on 9/14/15, grade 1, 1.5 cm size tumor, negative margins, no LVI, negative lymph nodes, ER strongly positive, SD strongly positive, HER2 negative, pT1cN0, stage IA      HISTORY OF PRESENT ILLNESS: Piper Jackson is a 52 year old pre-menopausal female who presented with newly diagnosed right-sided breast cancer.  Piper underwent her regular bilateral screening mammogram on 8/18/15, where a focal asymmetry in the right upper inner breast was found.  She was not having any symptoms at the time.  She denies feeling a breast lump/bump; no rash or nipple discharge.  An ultrasound was done, which found a 1.2 cm heterogenous focal lesion at the 1:00 position, 5 cm from the nipple.       She underwent lumpectomy on 9/14/15, pathology showed grade 1, 1.5 cm size tumor, negative margins, no LVI, negative lymph nodes, ER strongly positive, SD strongly positive, HER2 negative, pT1cN0, stage IA.  Oncotype DX score is 12.  She completed radiation treatment 10/19/15-12/1/15.  She started tamoxifen on 12/5/15.      INTERIM HISTORY: Piper comes in for follow-up today.   She say that she is doing well.  She denies any complaints today.  She continues to take tamoxifen, and denies any problems with it.      REVIEW OF SYSTEMS:   14 point ROS was reviewed and is negative other than as noted above in HPI.       HOME MEDICATIONS:  Current Outpatient Prescriptions   Medication Sig Dispense Refill     SUMAtriptan (IMITREX) 50 MG tablet Take 1 tablet (50 mg) by mouth at onset of headache for migraine May repeat dose in 2 hours if needed, max daily dose is 200 mg 10 tablet 5     escitalopram (LEXAPRO) 10 MG tablet Take 1 tablet (10 mg) by mouth daily 90 tablet 1     tamoxifen (NOLVADEX) 20 MG tablet Take 1 tablet (20 mg) by mouth daily 90 tablet 3      multivitamin, therapeutic with minerals (MULTI-VITAMIN) TABS Take 1 tablet by mouth daily       Calcium Citrate-Vitamin D (CALCIUM + D PO)            ALLERGIES:  Allergies   Allergen Reactions     No Known Allergies      No Clinical Screening - See Comments Rash     metal         PAST MEDICAL HISTORY:  Past Medical History:   Diagnosis Date     Generalized anxiety disorder 2002     Hirsutism     facial hair     Migraine          PAST SURGICAL HISTORY:  Past Surgical History:   Procedure Laterality Date     ARTHROSCOPY KNEE RT/LT  2007    right ,degenerative changes joint and meniscus     AS EXC MALIG SKIN LESION TRUNK/ARM/LEG 0.6-1.0 CM      melanoma     BIOPSY NODE SENTINEL Right 9/14/2015    Procedure: BIOPSY NODE SENTINEL;  Surgeon: Eliezer Bates MD;  Location: [facility]     COLONOSCOPY N/A 10/14/2016    Procedure: COLONOSCOPY;  Surgeon: Eliezer Bates MD;  Location:  GI     HC KNEE SCOPE, DIAGNOSTIC  1985    right, medial meniscus injury     LUMPECTOMY BREAST WITH SEED LOCALIZATION Right 9/14/2015    Procedure: LUMPECTOMY BREAST WITH SEED LOCALIZATION;  Surgeon: Eliezer Bates MD;  Location: [facility]         SOCIAL HISTORY:  Social History     Social History     Marital status:      Spouse name: Jonah     Number of children: 0     Years of education: 18     Occupational History      [email or web address]     masters in social work     Social History Main Topics     Smoking status: Never Smoker     Smokeless tobacco: Never Used     Alcohol use 1.2 oz/week     2 Standard drinks or equivalent per week      Comment: occasional      Drug use: No     Sexual activity: Yes     Partners: Male      Comment: same relationship since 2000, 3 partners lifelong     Other Topics Concern      Service No     Blood Transfusions No     Caffeine Concern No     3 a week     Occupational Exposure No     Hobby Hazards No     Sleep Concern No     6 hrs     Stress Concern No     low     Weight  "Concern No     Special Diet No     calcium/ high milk intake 2-3 glasses per day     Back Care Yes     lower back aches     Exercise Yes     running/weights 50-60 min 5-6 days per week     Bike Helmet Yes     Seat Belt Yes     Self-Exams No     Social History Narrative    Lives with .  Desires no children. Has a dog.  She works as a .         FAMILY HISTORY:  Family History   Problem Relation Age of Onset     CANCER Father      ocular melanoma,  of metastatic diseas age 81     Hypertension Father      Lipids Father      Prostate Cancer Father      onset age 70     GASTROINTESTINAL DISEASE Father      \"sensitive stomach\"     Skin Cancer Father      GASTROINTESTINAL DISEASE Mother      cholecystectomy     Eye Disorder Mother      cataract, macular degeneration     CEREBROVASCULAR DISEASE Mother 85     C.A.D. Maternal Grandmother       of MI age 83     C.A.D. Maternal Grandfather       MI early 60's     C.A.D. Paternal Grandfather       early 50s MI     Breast Cancer Paternal Grandmother       mid 70s     Hypertension Brother          PHYSICAL EXAM:  Vital signs:  /73 (BP Location: Left arm, Patient Position: Chair, Cuff Size: Adult Regular)  Pulse 77  Temp 98.8  F (37.1  C) (Oral)  Resp 16  Wt 71.7 kg (158 lb)  SpO2 98%  BMI 26.29 kg/m2   ECO  GENERAL/CONSTITUTIONAL: No acute distress.  EYES: No scleral icterus.  LYMPH: No anterior cervical, posterior cervical, supraclavicular, or axillary adenopathy.   RESPIRATORY: Clear to auscultation bilaterally. No crackles or wheezing.   CARDIOVASCULAR: Regular rate and rhythm without murmurs, gallops, or rubs.  GASTROINTESTINAL: No tenderness. The patient has normal bowel sounds. No guarding.  No distention.  BREAST: Right-s/p lumpectomy with slight firmness of scar tissue around the site; otherwise, no palpable mass, discharge, rash, or axillary lymphadenopathy;  Left-no palpable mass, discharge, rash, or axillary " lymphadenopathy.   MUSCULOSKELETAL: Warm and well-perfused, no cyanosis, clubbing, or edema.  NEUROLOGIC: Alert, oriented, answers questions appropriately.  INTEGUMENTARY: No rashes or jaundice.  GAIT: Steady, does not use assistive device      LABS:  None today.      IMAGING:  Bilateral mammogram 8/25/17:  COMMENTS: No findings of suspicion for malignancy.          IMPRESSION: BI-RADS CATEGORY: 1 -  Negative      ASSESSMENT/PLAN:  Piper Jackson is a 52 year old pre-menopausal, otherwise healthy, female:    1) Invasive ductal carcinoma of right upper inner breast: s/p lumpectomy on 9/14/15, pathology showed grade 1, 1.5 cm size tumor, negative margins, no LVI, negative lymph nodes, ER strongly positive, CO strongly positive, HER2 negative, pT1cN0, stage IA. Oncotype DX score is 12.  She completed radiation 10/19/15-12/1/15.  She started tamoxifen on 12/5/15.      There has been no evidence of recurrence on exam or mammogram.  She is tolerating tamoxifen well and will continue.      -Continue tamoxifen - plan to treat for 10 years, if can tolerate  -RTC in 6 months  -next bilateral screening mammogram in August 2018 - ordered    2) Melanoma in situ: s/p excision at left lateral lower leg  -continue follow-up with dermatology    3) Colon screening: She had screening colonoscopy 10/14/16.  It was normal.  Next colonoscopy was recommended for 10 year from then.      I spent a total of 25 minutes with the patient, with over >50% of the time in counseling and/or coordination of care.      Fabiola Kate MD  Hematology/Oncology  [facility]

## 2018-01-12 NOTE — PATIENT INSTRUCTIONS
-schedule mammogram in August 2018 Scheduled/lili  -return to clinic in 6 months   Scheduled/Janice      AVS printed & given to patient/Lili

## 2018-01-12 NOTE — MR AVS SNAPSHOT
"              After Visit Summary   1/12/2018    Piper Lisa    MRN: [id number]           Patient Information     Date Of Birth          1965        Visit Information        Provider Department      1/12/2018 2:30 PM Fabiola Kate MD [facility]        Today's Diagnoses     Malignant neoplasm of upper-inner quadrant of right breast in female, estrogen receptor positive (H)    -  1    Breast screening          Care Instructions    -schedule mammogram in August 2018  -return to clinic in 6 months          Follow-ups after your visit        Your next 10 appointments already scheduled     Jul 10, 2018 10:30 AM CDT   Return Visit with Fabiola Kate MD   [facility] ([facility])    [facility]  [address]  [facility] [place] [address]   [phone]            Aug 28, 2018  3:30 PM CDT   (Arrive by 3:15 PM)   MA SCREENING DIGITAL BILATERAL with SHBCMA2   [facility] ([facility])    [address], [address]  [place] [address] [phone]           Do not use any powder, lotion or deodorant under your arms or on your breast. If you do, we will ask you to remove it before your exam.  Wear comfortable, two-piece clothing.  If you have any allergies, tell your care team.  Bring any previous mammograms from other facilities or have them mailed to the breast center. Three-dimensional (3D) mammograms are available at [place] locations in [facility], [place], [place], [facility], [place], [place], and [place]. -Health locations include [facility] in [place]. Benefits of 3D mammograms include: - Improved rate of cancer detection - Decreases your chance of having to go back for more tests, which means fewer: - \"False-positive\" results (This means that there is an abnormal area but it isn't cancer.) - Invasive testing procedures, such as " a biopsy or surgery - Can provide clearer images of the breast if you have dense breast tissue. 3D mammography is an optional exam that anyone can have with a 2D mammogram. It doesn't replace or take the place of a 2D mammogram. 2D mammograms remain an effective screening test for all women.  Not all insurance companies cover the cost of a 3D mammogram. Check with your insurance.              Future tests that were ordered for you today     Open Future Orders        Priority Expected Expires Ordered    *MA Screening Digital Bilateral Routine 8/26/2018 1/12/2019 1/12/2018            Who to contact     If you have questions or need follow up information about today's clinic visit or your schedule please contact [facility] directly at [phone].  Normal or non-critical lab and imaging results will be communicated to you by viavoohart, letter or phone within 4 business days after the clinic has received the results. If you do not hear from us within 7 days, please contact the clinic through WhoWanna or phone. If you have a critical or abnormal lab result, we will notify you by phone as soon as possible.  Submit refill requests through WhoWanna or call your pharmacy and they will forward the refill request to us. Please allow 3 business days for your refill to be completed.          Additional Information About Your Visit        WhoWanna Information     WhoWanna gives you secure access to your electronic health record. If you see a primary care provider, you can also send messages to your care team and make appointments. If you have questions, please call your primary care clinic.  If you do not have a primary care provider, please call [phone] and they will assist you.        Care EveryWhere ID     This is your Care EveryWhere ID. This could be used by other organizations to access your [place] medical records  [id number]        Your Vitals Were     Pulse Temperature Respirations Pulse Oximetry BMI  (Body Mass Index)       77 98.8  F (37.1  C) (Oral) 16 98% 26.29 kg/m2        Blood Pressure from Last 3 Encounters:   01/12/18 111/73   08/23/17 110/62   07/25/17 126/72    Weight from Last 3 Encounters:   01/12/18 71.7 kg (158 lb)   08/23/17 69.2 kg (152 lb 9.6 oz)   07/25/17 67.4 kg (148 lb 9.6 oz)               Primary Care Provider Office Phone # Fax #    Maribel Serna [phone]       [address]  [place] [address]        Equal Access to Services     [facility] EVANS : Hadii pelon Rogers, wamaggi lance, qaybta kaalmada hyun, bam ricks . So [facility] [phone].    ATENCIÓN: Si habla español, tiene a salazar disposición servicios gratuitos de asistencia lingüística. Llame al [phone].    We comply with applicable federal civil rights laws and [place] laws. We do not discriminate on the basis of race, color, national origin, age, disability, sex, sexual orientation, or gender identity.            Thank you!     Thank you for choosing [facility]  for your care. Our goal is always to provide you with excellent care. Hearing back from our patients is one way we can continue to improve our services. Please take a few minutes to complete the written survey that you may receive in the mail after your visit with us. Thank you!             Your Updated Medication List - Protect others around you: Learn how to safely use, store and throw away your medicines at www.disposemymeds.org.          This list is accurate as of: 1/12/18  3:03 PM.  Always use your most recent med list.                   Brand Name Dispense Instructions for use Diagnosis    CALCIUM + D PO           escitalopram 10 MG tablet    LEXAPRO    90 tablet    Take 1 tablet (10 mg) by mouth daily    Generalized anxiety disorder       Multi-vitamin Tabs tablet      Take 1 tablet by mouth daily        SUMAtriptan 50 MG tablet    IMITREX    10 tablet    Take 1 tablet (50 mg)  by mouth at onset of headache for migraine May repeat dose in 2 hours if needed, max daily dose is 200 mg    Migraine without aura and without status migrainosus, not intractable       tamoxifen 20 MG tablet    NOLVADEX    90 tablet    Take 1 tablet (20 mg) by mouth daily    Malignant neoplasm of upper-inner quadrant of right breast in female, estrogen receptor positive (H)

## 2018-01-12 NOTE — LETTER
"    1/12/2018         RE: Piper Lisa  [address]  [place] [address]        Dear Colleague,    Thank you for referring your patient, Piper Lisa, to the [facility]. Please see a copy of my visit note below.    Oncology Rooming Note    January 12, 2018 2:47 PM   Piper Lisa is a 52 year old female who presents for:    Chief Complaint   Patient presents with     Oncology Clinic Visit     Initial Vitals: /73 (BP Location: Left arm, Patient Position: Chair, Cuff Size: Adult Regular)  Pulse 77  Temp 98.8  F (37.1  C) (Oral)  Resp 16  Wt 71.7 kg (158 lb)  SpO2 98%  BMI 26.29 kg/m2 Estimated body mass index is 26.29 kg/(m^2) as calculated from the following:    Height as of 8/23/17: 1.651 m (5' 5\").    Weight as of this encounter: 71.7 kg (158 lb). Body surface area is 1.81 meters squared.  No Pain (0) Comment: Data Unavailable   No LMP recorded.  Allergies reviewed: Yes  Medications reviewed: Yes    Medications: Medication refills not needed today.  Pharmacy name entered into [employer]:    [id number] - [place] [address] & [facility]  [facility] - [place], [place] - [address]    Clinical concerns: None     5 minutes for nursing intake (face to face time)     Kristine Maher CMA                [facility]    Hematology/Oncology Established Patient Note      Today's Date: 1/12/2018    Reason for Follow-up: Right invasive ductal carcinoma of breast s/p lumpectomy on 9/14/15, grade 1, 1.5 cm size tumor, negative margins, no LVI, negative lymph nodes, ER strongly positive, WY strongly positive, HER2 negative, pT1cN0, stage IA      HISTORY OF PRESENT ILLNESS: Piper Jackson is a 52 year old pre-menopausal female who presented with newly diagnosed right-sided breast cancer.  Piper underwent her regular bilateral screening mammogram on " 8/18/15, where a focal asymmetry in the right upper inner breast was found.  She was not having any symptoms at the time.  She denies feeling a breast lump/bump; no rash or nipple discharge.  An ultrasound was done, which found a 1.2 cm heterogenous focal lesion at the 1:00 position, 5 cm from the nipple.       She underwent lumpectomy on 9/14/15, pathology showed grade 1, 1.5 cm size tumor, negative margins, no LVI, negative lymph nodes, ER strongly positive, ND strongly positive, HER2 negative, pT1cN0, stage IA.  Oncotype DX score is 12.  She completed radiation treatment 10/19/15-12/1/15.  She started tamoxifen on 12/5/15.      INTERIM HISTORY: Piper comes in for follow-up today.   She say that she is doing well.  She denies any complaints today.  She continues to take tamoxifen, and denies any problems with it.      REVIEW OF SYSTEMS:   14 point ROS was reviewed and is negative other than as noted above in HPI.       HOME MEDICATIONS:  Current Outpatient Prescriptions   Medication Sig Dispense Refill     SUMAtriptan (IMITREX) 50 MG tablet Take 1 tablet (50 mg) by mouth at onset of headache for migraine May repeat dose in 2 hours if needed, max daily dose is 200 mg 10 tablet 5     escitalopram (LEXAPRO) 10 MG tablet Take 1 tablet (10 mg) by mouth daily 90 tablet 1     tamoxifen (NOLVADEX) 20 MG tablet Take 1 tablet (20 mg) by mouth daily 90 tablet 3     multivitamin, therapeutic with minerals (MULTI-VITAMIN) TABS Take 1 tablet by mouth daily       Calcium Citrate-Vitamin D (CALCIUM + D PO)            ALLERGIES:  Allergies   Allergen Reactions     No Known Allergies      No Clinical Screening - See Comments Rash     metal         PAST MEDICAL HISTORY:  Past Medical History:   Diagnosis Date     Generalized anxiety disorder 2002     Hirsutism     facial hair     Migraine          PAST SURGICAL HISTORY:  Past Surgical History:   Procedure Laterality Date     ARTHROSCOPY KNEE RT/LT  2007    right ,degenerative changes  joint and meniscus     AS EXC MALIG SKIN LESION TRUNK/ARM/LEG 0.6-1.0 CM      melanoma     BIOPSY NODE SENTINEL Right 2015    Procedure: BIOPSY NODE SENTINEL;  Surgeon: Eliezer Bates MD;  Location: [facility]     COLONOSCOPY N/A 10/14/2016    Procedure: COLONOSCOPY;  Surgeon: Eliezer Bates MD;  Location:  GI     HC KNEE SCOPE, DIAGNOSTIC      right, medial meniscus injury     LUMPECTOMY BREAST WITH SEED LOCALIZATION Right 2015    Procedure: LUMPECTOMY BREAST WITH SEED LOCALIZATION;  Surgeon: Eliezer Bates MD;  Location: [facility]         SOCIAL HISTORY:  Social History     Social History     Marital status:      Spouse name: Jonah     Number of children: 0     Years of education: 18     Occupational History      [employer]     masters in social work     Social History Main Topics     Smoking status: Never Smoker     Smokeless tobacco: Never Used     Alcohol use 1.2 oz/week     2 Standard drinks or equivalent per week      Comment: occasional      Drug use: No     Sexual activity: Yes     Partners: Male      Comment: same relationship since , 3 partners lifelong     Other Topics Concern      Service No     Blood Transfusions No     Caffeine Concern No     3 a week     Occupational Exposure No     Hobby Hazards No     Sleep Concern No     6 hrs     Stress Concern No     low     Weight Concern No     Special Diet No     calcium/ high milk intake 2-3 glasses per day     Back Care Yes     lower back aches     Exercise Yes     running/weights 50-60 min 5-6 days per week     Bike Helmet Yes     Seat Belt Yes     Self-Exams No     Social History Narrative    Lives with .  Desires no children. Has a dog.  She works as a .         FAMILY HISTORY:  Family History   Problem Relation Age of Onset     CANCER Father      ocular melanoma,  of metastatic diseas age 81     Hypertension Father      Lipids Father      Prostate  "Cancer Father      onset age 70     GASTROINTESTINAL DISEASE Father      \"sensitive stomach\"     Skin Cancer Father      GASTROINTESTINAL DISEASE Mother      cholecystectomy     Eye Disorder Mother      cataract, macular degeneration     CEREBROVASCULAR DISEASE Mother 85     C.A.D. Maternal Grandmother       of MI age 83     C.A.D. Maternal Grandfather       MI early 60's     C.A.D. Paternal Grandfather       early 50s MI     Breast Cancer Paternal Grandmother       mid 70s     Hypertension Brother          PHYSICAL EXAM:  Vital signs:  /73 (BP Location: Left arm, Patient Position: Chair, Cuff Size: Adult Regular)  Pulse 77  Temp 98.8  F (37.1  C) (Oral)  Resp 16  Wt 71.7 kg (158 lb)  SpO2 98%  BMI 26.29 kg/m2   ECO  GENERAL/CONSTITUTIONAL: No acute distress.  EYES: No scleral icterus.  LYMPH: No anterior cervical, posterior cervical, supraclavicular, or axillary adenopathy.   RESPIRATORY: Clear to auscultation bilaterally. No crackles or wheezing.   CARDIOVASCULAR: Regular rate and rhythm without murmurs, gallops, or rubs.  GASTROINTESTINAL: No tenderness. The patient has normal bowel sounds. No guarding.  No distention.  BREAST: Right-s/p lumpectomy with slight firmness of scar tissue around the site; otherwise, no palpable mass, discharge, rash, or axillary lymphadenopathy;  Left-no palpable mass, discharge, rash, or axillary lymphadenopathy.   MUSCULOSKELETAL: Warm and well-perfused, no cyanosis, clubbing, or edema.  NEUROLOGIC: Alert, oriented, answers questions appropriately.  INTEGUMENTARY: No rashes or jaundice.  GAIT: Steady, does not use assistive device      LABS:  None today.      IMAGING:  Bilateral mammogram 17:  COMMENTS: No findings of suspicion for malignancy.          IMPRESSION: BI-RADS CATEGORY: 1 -  Negative      ASSESSMENT/PLAN:  Piper Jackson is a 52 year old pre-menopausal, otherwise healthy, female:    1) Invasive ductal carcinoma of right upper inner " breast: s/p lumpectomy on 9/14/15, pathology showed grade 1, 1.5 cm size tumor, negative margins, no LVI, negative lymph nodes, ER strongly positive, IA strongly positive, HER2 negative, pT1cN0, stage IA. Oncotype DX score is 12.  She completed radiation 10/19/15-12/1/15.  She started tamoxifen on 12/5/15.      There has been no evidence of recurrence on exam or mammogram.  She is tolerating tamoxifen well and will continue.      -Continue tamoxifen - plan to treat for 10 years, if can tolerate  -RTC in 6 months  -next bilateral screening mammogram in August 2018 - ordered    2) Melanoma in situ: s/p excision at left lateral lower leg  -continue follow-up with dermatology    3) Colon screening: She had screening colonoscopy 10/14/16.  It was normal.  Next colonoscopy was recommended for 10 year from then.      I spent a total of 25 minutes with the patient, with over >50% of the time in counseling and/or coordination of care.      Fabiola Kate MD  Hematology/Oncology  [facility]        Again, thank you for allowing me to participate in the care of your patient.        Sincerely,        Fabiola Kate MD

## 2018-03-04 DIAGNOSIS — F41.1 GENERALIZED ANXIETY DISORDER: ICD-10-CM

## 2018-03-05 NOTE — TELEPHONE ENCOUNTER
"Requested Prescriptions   Pending Prescriptions Disp Refills     escitalopram (LEXAPRO) 20 MG tablet [Pharmacy Med Name: ESCITALOPRAM OXALATE 20MG TABS] 90 tablet 1     Sig: TAKE ONE TABLET BY MOUTH EVERY DAY    SSRIs Protocol Passed    3/4/2018 10:07 AM       Passed - Recent (12 mo) or future (30 days) visit within the authorizing provider's specialty    Patient had office visit in the last year or has a visit in the next 30 days with authorizing provider.  See \"Patient Info\" tab in inbasket, or \"Choose Columns\" in Meds & Orders section of the refill encounter.            Passed - Patient is age 18 or older       Passed - No active pregnancy on record       Passed - No positive pregnancy test in last 12 months        escitalopram (LEXAPRO) 10 MG tablet 90 tablet 1 8/23/2017       Last Written Prescription Date:  08/23/2017  Last Fill Quantity: 90,  # refills: 1   Last office visit: 8/23/2017 with prescribing provider:  Dr. Serna    Future Office Visit:  Unknown     "

## 2018-03-06 RX ORDER — ESCITALOPRAM OXALATE 20 MG/1
TABLET ORAL
Qty: 90 TABLET | Refills: 0 | Status: SHIPPED | OUTPATIENT
Start: 2018-03-06 | End: 2018-06-08

## 2018-03-06 NOTE — TELEPHONE ENCOUNTER
Prescribed for anxiety, no depression found on problem list at this time.    DAVID-7 SCORE 12/23/2015 8/23/2016 8/23/2017   Total Score - - -   Total Score 3 0 0     Refill request approved per [facility] protocol    Susannah Mckenzie RN

## 2018-06-08 DIAGNOSIS — F41.1 GENERALIZED ANXIETY DISORDER: ICD-10-CM

## 2018-06-08 RX ORDER — ESCITALOPRAM OXALATE 20 MG/1
TABLET ORAL
Qty: 90 TABLET | Refills: 0 | Status: SHIPPED | OUTPATIENT
Start: 2018-06-08 | End: 2018-07-10

## 2018-06-08 NOTE — TELEPHONE ENCOUNTER
Medication is being filled for 1 time refill only due to:  Patient due for px in August   Mirian Johnson RN- Triage [employer]

## 2018-06-08 NOTE — TELEPHONE ENCOUNTER
"Requested Prescriptions   Pending Prescriptions Disp Refills     escitalopram (LEXAPRO) 20 MG tablet [Pharmacy Med Name: ESCITALOPRAM OXALATE 20MG TABS]  Last Written Prescription Date:  3/6/18  Last Fill Quantity: 90,  # refills: 0   Last office visit: 8/23/2017 with prescribing provider:  Kareem   Future Office Visit:   Next 5 appointments (look out 90 days)     Jul 10, 2018 10:30 AM CDT   Return Visit with Fabiola aKte MD   [facility] ([facility])    [facility]  [address]  [place] [address]   [phone]                  90 tablet 0     Sig: TAKE ONE TABLET BY MOUTH EVERY DAY    SSRIs Protocol Passed    6/8/2018  8:51 AM       Passed - Recent (12 mo) or future (30 days) visit within the authorizing provider's specialty    Patient had office visit in the last 12 months or has a visit in the next 30 days with authorizing provider or within the authorizing provider's specialty.  See \"Patient Info\" tab in inbasket, or \"Choose Columns\" in Meds & Orders section of the refill encounter.           Passed - Patient is age 18 or older       Passed - No active pregnancy on record       Passed - No positive pregnancy test in last 12 months          "

## 2018-07-10 ENCOUNTER — ONCOLOGY VISIT (OUTPATIENT)
Dept: ONCOLOGY | Facility: CLINIC | Age: 53
End: 2018-07-10
Attending: INTERNAL MEDICINE
Payer: COMMERCIAL

## 2018-07-10 VITALS
TEMPERATURE: 98.3 F | WEIGHT: 156.2 LBS | BODY MASS INDEX: 25.99 KG/M2 | DIASTOLIC BLOOD PRESSURE: 74 MMHG | HEART RATE: 74 BPM | OXYGEN SATURATION: 99 % | RESPIRATION RATE: 18 BRPM | SYSTOLIC BLOOD PRESSURE: 112 MMHG

## 2018-07-10 DIAGNOSIS — Z17.0 MALIGNANT NEOPLASM OF UPPER-INNER QUADRANT OF RIGHT BREAST IN FEMALE, ESTROGEN RECEPTOR POSITIVE (H): Primary | ICD-10-CM

## 2018-07-10 DIAGNOSIS — C50.211 MALIGNANT NEOPLASM OF UPPER-INNER QUADRANT OF RIGHT BREAST IN FEMALE, ESTROGEN RECEPTOR POSITIVE (H): Primary | ICD-10-CM

## 2018-07-10 PROCEDURE — G0463 HOSPITAL OUTPT CLINIC VISIT: HCPCS

## 2018-07-10 PROCEDURE — 99214 OFFICE O/P EST MOD 30 MIN: CPT | Performed by: INTERNAL MEDICINE

## 2018-07-10 RX ORDER — TAMOXIFEN CITRATE 20 MG/1
20 TABLET ORAL DAILY
Qty: 90 TABLET | Refills: 3 | Status: SHIPPED | OUTPATIENT
Start: 2018-07-10 | End: 2019-01-15

## 2018-07-10 ASSESSMENT — PAIN SCALES - GENERAL: PAINLEVEL: NO PAIN (0)

## 2018-07-10 NOTE — PATIENT INSTRUCTIONS
-tamoxifen prescription sent to your pharmacy  -mammogram in August 2018 already scheduled  -return to clinic in 6 months  - Scheduled - Gris    Patient declined AVS - Gris

## 2018-07-10 NOTE — PROGRESS NOTES
[facility]    Hematology/Oncology Established Patient Note      Today's Date: 7/10/2018    Reason for Follow-up: Right invasive ductal carcinoma of breast s/p lumpectomy on 9/14/15, grade 1, 1.5 cm size tumor, negative margins, no LVI, negative lymph nodes, ER strongly positive, NC strongly positive, HER2 negative, pT1cN0, stage IA      HISTORY OF PRESENT ILLNESS: Piper Jackson is a 53 year old pre-menopausal female who presented with newly diagnosed right-sided breast cancer.  Piper underwent her regular bilateral screening mammogram on 8/18/15, where a focal asymmetry in the right upper inner breast was found.  She was not having any symptoms at the time.  She denies feeling a breast lump/bump; no rash or nipple discharge.  An ultrasound was done, which found a 1.2 cm heterogenous focal lesion at the 1:00 position, 5 cm from the nipple.       She underwent lumpectomy on 9/14/15, pathology showed grade 1, 1.5 cm size tumor, negative margins, no LVI, negative lymph nodes, ER strongly positive, NC strongly positive, HER2 negative, pT1cN0, stage IA.  Oncotype DX score is 12.  She completed radiation treatment 10/19/15-12/1/15.  She started tamoxifen on 12/5/15.      INTERIM HISTORY: Piper comes in for follow-up today.   She says that she has been doing well.  She continues to take tamoxifen.  She note that the prior hot flashes have dissipated.  She denies new lumps/bumps or new pains.      REVIEW OF SYSTEMS:   14 point ROS was reviewed and is negative other than as noted above in HPI.       HOME MEDICATIONS:  Current Outpatient Prescriptions   Medication Sig Dispense Refill     Calcium Citrate-Vitamin D (CALCIUM + D PO)        escitalopram (LEXAPRO) 10 MG tablet Take 1 tablet (10 mg) by mouth daily 90 tablet 1     multivitamin, therapeutic with minerals (MULTI-VITAMIN) TABS Take 1 tablet by mouth daily       SUMAtriptan (IMITREX) 50 MG tablet Take 1 tablet (50 mg) by mouth at onset of headache for  migraine May repeat dose in 2 hours if needed, max daily dose is 200 mg 10 tablet 5     tamoxifen (NOLVADEX) 20 MG tablet Take 1 tablet (20 mg) by mouth daily 90 tablet 3     [DISCONTINUED] escitalopram (LEXAPRO) 20 MG tablet TAKE ONE TABLET BY MOUTH EVERY DAY 90 tablet 0         ALLERGIES:  Allergies   Allergen Reactions     No Known Allergies      No Clinical Screening - See Comments Rash     metal         PAST MEDICAL HISTORY:  Past Medical History:   Diagnosis Date     Generalized anxiety disorder 2002     Hirsutism     facial hair     Migraine          PAST SURGICAL HISTORY:  Past Surgical History:   Procedure Laterality Date     ARTHROSCOPY KNEE RT/LT  2007    right ,degenerative changes joint and meniscus     AS EXC MALIG SKIN LESION TRUNK/ARM/LEG 0.6-1.0 CM      melanoma     BIOPSY NODE SENTINEL Right 9/14/2015    Procedure: BIOPSY NODE SENTINEL;  Surgeon: Eliezer Bates MD;  Location: [facility]     COLONOSCOPY N/A 10/14/2016    Procedure: COLONOSCOPY;  Surgeon: Eliezer Bates MD;  Location:  GI      KNEE SCOPE, DIAGNOSTIC  1985    right, medial meniscus injury     LUMPECTOMY BREAST WITH SEED LOCALIZATION Right 9/14/2015    Procedure: LUMPECTOMY BREAST WITH SEED LOCALIZATION;  Surgeon: Eliezer Bates MD;  Location: [facility]         SOCIAL HISTORY:  Social History     Social History     Marital status:      Spouse name: Jonah     Number of children: 0     Years of education: 18     Occupational History      [employer]     masters in social work     Social History Main Topics     Smoking status: Never Smoker     Smokeless tobacco: Never Used     Alcohol use 1.2 oz/week     2 Standard drinks or equivalent per week      Comment: occasional      Drug use: No     Sexual activity: Yes     Partners: Male      Comment: same relationship since 2000, 3 partners lifelong     Other Topics Concern      Service No     Blood Transfusions No     Caffeine Concern No      "3 a week     Occupational Exposure No     Hobby Hazards No     Sleep Concern No     6 hrs     Stress Concern No     low     Weight Concern No     Special Diet No     calcium/ high milk intake 2-3 glasses per day     Back Care Yes     lower back aches     Exercise Yes     running/weights 50-60 min 5-6 days per week     Bike Helmet Yes     Seat Belt Yes     Self-Exams No     Social History Narrative    Lives with .  Desires no children. Has a dog.  She works as a .         FAMILY HISTORY:  Family History   Problem Relation Age of Onset     Cancer Father      ocular melanoma,  of metastatic diseas age 81     Hypertension Father      Lipids Father      Prostate Cancer Father      onset age 70     GASTROINTESTINAL DISEASE Father      \"sensitive stomach\"     Skin Cancer Father      GASTROINTESTINAL DISEASE Mother      cholecystectomy     Eye Disorder Mother      cataract, macular degeneration     Cerebrovascular Disease Mother 85     C.A.D. Maternal Grandmother       of MI age 83     C.A.D. Maternal Grandfather       MI early 60's     C.A.D. Paternal Grandfather       early 50s MI     Breast Cancer Paternal Grandmother       mid 70s     Hypertension Brother          PHYSICAL EXAM:  Vital signs:  /74 (BP Location: Left arm, Patient Position: Sitting, Cuff Size: Adult Regular)  Pulse 74  Temp 98.3  F (36.8  C) (Oral)  Resp 18  Wt 70.9 kg (156 lb 3.2 oz)  SpO2 99%  BMI 25.99 kg/m2   ECO  GENERAL/CONSTITUTIONAL: No acute distress.  EYES: No scleral icterus.  LYMPH: No anterior cervical, posterior cervical, supraclavicular, or axillary adenopathy.   RESPIRATORY: Clear to auscultation bilaterally. No crackles or wheezing.   CARDIOVASCULAR: Regular rate and rhythm without murmurs, gallops, or rubs.  GASTROINTESTINAL: No tenderness. The patient has normal bowel sounds. No guarding.  No distention.  BREAST: Right-s/p lumpectomy with slight firmness of scar tissue " around the site; otherwise, no palpable mass, discharge, rash, or axillary lymphadenopathy;  Left-no palpable mass, discharge, rash, or axillary lymphadenopathy.   MUSCULOSKELETAL: Warm and well-perfused, no cyanosis, clubbing, or edema.  NEUROLOGIC: Alert, oriented, answers questions appropriately.  INTEGUMENTARY: No rashes or jaundice.  GAIT: Steady, does not use assistive device      LABS:  None today.      IMAGING:  Bilateral mammogram 8/25/17:  COMMENTS: No findings of suspicion for malignancy.          IMPRESSION: BI-RADS CATEGORY: 1 -  Negative      ASSESSMENT/PLAN:  Piper Jackson is a 53 year old pre-menopausal, otherwise healthy, female:    1) Invasive ductal carcinoma of right upper inner breast: s/p lumpectomy on 9/14/15, pathology showed grade 1, 1.5 cm size tumor, negative margins, no LVI, negative lymph nodes, ER strongly positive, CA strongly positive, HER2 negative, pT1cN0, stage IA. Oncotype DX score is 12.  She completed radiation 10/19/15-12/1/15.  She started tamoxifen on 12/5/15.      There has been no evidence of recurrence on exam or mammogram.  She is tolerating tamoxifen well and will continue.      -Continue tamoxifen - plan to treat for 10 years, if can tolerate.  Prescription renewed today.  -RTC in 6 months  -next bilateral screening mammogram in August 2018 - ordered    2) Melanoma in situ: s/p excision at left lateral lower leg  -continue follow-up with dermatology    3) Colon screening: She had screening colonoscopy 10/14/16.  It was normal.  Next colonoscopy was recommended for 10 year from then.    4) Generalized anxiety disorder: She is on Lexapro, down to 10 mg.  -she is working with her PCP for titration of medication      I spent a total of 25 minutes with the patient, with over >50% of the time in counseling and/or coordination of care.      Fabiola Kate MD  Hematology/Oncology  [facility]

## 2018-07-10 NOTE — LETTER
"    7/10/2018         RE: Piper Lisa  [address]  [place] [address]        Dear Colleague,    Thank you for referring your patient, Piper Lisa, to the [facility]. Please see a copy of my visit note below.    Oncology Rooming Note    July 10, 2018 10:42 AM   Piper Lisa is a 53 year old female who presents for:    Chief Complaint   Patient presents with     Oncology Clinic Visit     History of melanoma in situ     Initial Vitals: /74 (BP Location: Left arm, Patient Position: Sitting, Cuff Size: Adult Regular)  Pulse 74  Temp 98.3  F (36.8  C) (Oral)  Resp 18  Wt 70.9 kg (156 lb 3.2 oz)  SpO2 99%  BMI 25.99 kg/m2 Estimated body mass index is 25.99 kg/(m^2) as calculated from the following:    Height as of 8/23/17: 1.651 m (5' 5\").    Weight as of this encounter: 70.9 kg (156 lb 3.2 oz). Body surface area is 1.8 meters squared.  No Pain (0) Comment: Data Unavailable   No LMP recorded.  Allergies reviewed: Yes  Medications reviewed: Yes    Medications: MEDICATION REFILLS NEEDED TODAY. Provider was notified. Refill LEXAPRO  Pharmacy name entered into [facility]:    [id number] - [facility] [address] [place] [address] [address] & [facility]  [facility] [place] [facility] - [place], [place] - [address]    Clinical concerns: no   5 minutes for nursing intake (face to face time)          Marisel La MA                [facility]    Hematology/Oncology Established Patient Note      Today's Date: 7/10/2018    Reason for Follow-up: Right invasive ductal carcinoma of breast s/p lumpectomy on 9/14/15, grade 1, 1.5 cm size tumor, negative margins, no LVI, negative lymph nodes, ER strongly positive, FL strongly positive, HER2 negative, pT1cN0, stage IA      HISTORY OF PRESENT ILLNESS: Piper Jackson is a 53 year old pre-menopausal female who presented with newly diagnosed right-sided breast " cancer.  Piper underwent her regular bilateral screening mammogram on 8/18/15, where a focal asymmetry in the right upper inner breast was found.  She was not having any symptoms at the time.  She denies feeling a breast lump/bump; no rash or nipple discharge.  An ultrasound was done, which found a 1.2 cm heterogenous focal lesion at the 1:00 position, 5 cm from the nipple.       She underwent lumpectomy on 9/14/15, pathology showed grade 1, 1.5 cm size tumor, negative margins, no LVI, negative lymph nodes, ER strongly positive, OR strongly positive, HER2 negative, pT1cN0, stage IA.  Oncotype DX score is 12.  She completed radiation treatment 10/19/15-12/1/15.  She started tamoxifen on 12/5/15.      INTERIM HISTORY: Piper comes in for follow-up today.   She says that she has been doing well.  She continues to take tamoxifen.  She note that the prior hot flashes have dissipated.  She denies new lumps/bumps or new pains.      REVIEW OF SYSTEMS:   14 point ROS was reviewed and is negative other than as noted above in HPI.       HOME MEDICATIONS:  Current Outpatient Prescriptions   Medication Sig Dispense Refill     Calcium Citrate-Vitamin D (CALCIUM + D PO)        escitalopram (LEXAPRO) 10 MG tablet Take 1 tablet (10 mg) by mouth daily 90 tablet 1     multivitamin, therapeutic with minerals (MULTI-VITAMIN) TABS Take 1 tablet by mouth daily       SUMAtriptan (IMITREX) 50 MG tablet Take 1 tablet (50 mg) by mouth at onset of headache for migraine May repeat dose in 2 hours if needed, max daily dose is 200 mg 10 tablet 5     tamoxifen (NOLVADEX) 20 MG tablet Take 1 tablet (20 mg) by mouth daily 90 tablet 3     [DISCONTINUED] escitalopram (LEXAPRO) 20 MG tablet TAKE ONE TABLET BY MOUTH EVERY DAY 90 tablet 0         ALLERGIES:  Allergies   Allergen Reactions     No Known Allergies      No Clinical Screening - See Comments Rash     metal         PAST MEDICAL HISTORY:  Past Medical History:   Diagnosis Date     Generalized anxiety  disorder 2002     Hirsutism     facial hair     Migraine          PAST SURGICAL HISTORY:  Past Surgical History:   Procedure Laterality Date     ARTHROSCOPY KNEE RT/LT  2007    right ,degenerative changes joint and meniscus     AS EXC MALIG SKIN LESION TRUNK/ARM/LEG 0.6-1.0 CM      melanoma     BIOPSY NODE SENTINEL Right 9/14/2015    Procedure: BIOPSY NODE SENTINEL;  Surgeon: Eliezer Bates MD;  Location: [facility]     COLONOSCOPY N/A 10/14/2016    Procedure: COLONOSCOPY;  Surgeon: Eliezer Bates MD;  Location:  GI     HC KNEE SCOPE, DIAGNOSTIC  1985    right, medial meniscus injury     LUMPECTOMY BREAST WITH SEED LOCALIZATION Right 9/14/2015    Procedure: LUMPECTOMY BREAST WITH SEED LOCALIZATION;  Surgeon: Eliezer Bates MD;  Location: [facility]         SOCIAL HISTORY:  Social History     Social History     Marital status:      Spouse name: Jonah     Number of children: 0     Years of education: 18     Occupational History      [employer]     masters in social work     Social History Main Topics     Smoking status: Never Smoker     Smokeless tobacco: Never Used     Alcohol use 1.2 oz/week     2 Standard drinks or equivalent per week      Comment: occasional      Drug use: No     Sexual activity: Yes     Partners: Male      Comment: same relationship since 2000, 3 partners lifelong     Other Topics Concern      Service No     Blood Transfusions No     Caffeine Concern No     3 a week     Occupational Exposure No     Hobby Hazards No     Sleep Concern No     6 hrs     Stress Concern No     low     Weight Concern No     Special Diet No     calcium/ high milk intake 2-3 glasses per day     Back Care Yes     lower back aches     Exercise Yes     running/weights 50-60 min 5-6 days per week     Bike Helmet Yes     Seat Belt Yes     Self-Exams No     Social History Narrative    Lives with .  Desires no children. Has a dog.  She works as a .  "        FAMILY HISTORY:  Family History   Problem Relation Age of Onset     Cancer Father      ocular melanoma,  of metastatic diseas age 81     Hypertension Father      Lipids Father      Prostate Cancer Father      onset age 70     GASTROINTESTINAL DISEASE Father      \"sensitive stomach\"     Skin Cancer Father      GASTROINTESTINAL DISEASE Mother      cholecystectomy     Eye Disorder Mother      cataract, macular degeneration     Cerebrovascular Disease Mother 85     C.A.D. Maternal Grandmother       of MI age 83     C.A.D. Maternal Grandfather       MI early 60's     C.A.D. Paternal Grandfather       early 50s MI     Breast Cancer Paternal Grandmother       mid 70s     Hypertension Brother          PHYSICAL EXAM:  Vital signs:  /74 (BP Location: Left arm, Patient Position: Sitting, Cuff Size: Adult Regular)  Pulse 74  Temp 98.3  F (36.8  C) (Oral)  Resp 18  Wt 70.9 kg (156 lb 3.2 oz)  SpO2 99%  BMI 25.99 kg/m2   ECO  GENERAL/CONSTITUTIONAL: No acute distress.  EYES: No scleral icterus.  LYMPH: No anterior cervical, posterior cervical, supraclavicular, or axillary adenopathy.   RESPIRATORY: Clear to auscultation bilaterally. No crackles or wheezing.   CARDIOVASCULAR: Regular rate and rhythm without murmurs, gallops, or rubs.  GASTROINTESTINAL: No tenderness. The patient has normal bowel sounds. No guarding.  No distention.  BREAST: Right-s/p lumpectomy with slight firmness of scar tissue around the site; otherwise, no palpable mass, discharge, rash, or axillary lymphadenopathy;  Left-no palpable mass, discharge, rash, or axillary lymphadenopathy.   MUSCULOSKELETAL: Warm and well-perfused, no cyanosis, clubbing, or edema.  NEUROLOGIC: Alert, oriented, answers questions appropriately.  INTEGUMENTARY: No rashes or jaundice.  GAIT: Steady, does not use assistive device      LABS:  None today.      IMAGING:  Bilateral mammogram 17:  COMMENTS: No findings of suspicion for " malignancy.          IMPRESSION: BI-RADS CATEGORY: 1 -  Negative      ASSESSMENT/PLAN:  Piper Jackson is a 53 year old pre-menopausal, otherwise healthy, female:    1) Invasive ductal carcinoma of right upper inner breast: s/p lumpectomy on 9/14/15, pathology showed grade 1, 1.5 cm size tumor, negative margins, no LVI, negative lymph nodes, ER strongly positive, CT strongly positive, HER2 negative, pT1cN0, stage IA. Oncotype DX score is 12.  She completed radiation 10/19/15-12/1/15.  She started tamoxifen on 12/5/15.      There has been no evidence of recurrence on exam or mammogram.  She is tolerating tamoxifen well and will continue.      -Continue tamoxifen - plan to treat for 10 years, if can tolerate.  Prescription renewed today.  -RTC in 6 months  -next bilateral screening mammogram in August 2018 - ordered    2) Melanoma in situ: s/p excision at left lateral lower leg  -continue follow-up with dermatology    3) Colon screening: She had screening colonoscopy 10/14/16.  It was normal.  Next colonoscopy was recommended for 10 year from then.    4) Generalized anxiety disorder: She is on Lexapro, down to 10 mg.  -she is working with her PCP for titration of medication      I spent a total of 25 minutes with the patient, with over >50% of the time in counseling and/or coordination of care.      Fabiola Kate MD  Hematology/Oncology  [facility]        Again, thank you for allowing me to participate in the care of your patient.        Sincerely,        Fabiola Kate MD

## 2018-07-10 NOTE — PROGRESS NOTES
"Oncology Rooming Note    July 10, 2018 10:42 AM   Piper Lisa is a 53 year old female who presents for:    Chief Complaint   Patient presents with     Oncology Clinic Visit     History of melanoma in situ     Initial Vitals: /74 (BP Location: Left arm, Patient Position: Sitting, Cuff Size: Adult Regular)  Pulse 74  Temp 98.3  F (36.8  C) (Oral)  Resp 18  Wt 70.9 kg (156 lb 3.2 oz)  SpO2 99%  BMI 25.99 kg/m2 Estimated body mass index is 25.99 kg/(m^2) as calculated from the following:    Height as of 8/23/17: 1.651 m (5' 5\").    Weight as of this encounter: 70.9 kg (156 lb 3.2 oz). Body surface area is 1.8 meters squared.  No Pain (0) Comment: Data Unavailable   No LMP recorded.  Allergies reviewed: Yes  Medications reviewed: Yes    Medications: MEDICATION REFILLS NEEDED TODAY. Provider was notified. Refill LEXAPRO  Pharmacy name entered into [facility]:    [id number] [address] [address] & [facility]  [facility] - [place], [place] - [address]    Clinical concerns: no   5 minutes for nursing intake (face to face time)          Marisel La MA              "

## 2018-07-31 ENCOUNTER — E-VISIT (OUTPATIENT)
Dept: FAMILY MEDICINE | Facility: CLINIC | Age: 53
End: 2018-07-31
Payer: COMMERCIAL

## 2018-07-31 DIAGNOSIS — F41.1 GENERALIZED ANXIETY DISORDER: ICD-10-CM

## 2018-07-31 PROCEDURE — 99444 ZZC PHYSICIAN ONLINE EVALUATION & MANAGEMENT SERVICE: CPT | Performed by: FAMILY MEDICINE

## 2018-07-31 RX ORDER — ESCITALOPRAM OXALATE 10 MG/1
10 TABLET ORAL DAILY
Qty: 30 TABLET | Refills: 3 | Status: SHIPPED | OUTPATIENT
Start: 2018-07-31 | End: 2018-08-28

## 2018-08-28 ENCOUNTER — OFFICE VISIT (OUTPATIENT)
Dept: FAMILY MEDICINE | Facility: CLINIC | Age: 53
End: 2018-08-28
Payer: COMMERCIAL

## 2018-08-28 VITALS
DIASTOLIC BLOOD PRESSURE: 64 MMHG | TEMPERATURE: 98.6 F | OXYGEN SATURATION: 98 % | WEIGHT: 154 LBS | SYSTOLIC BLOOD PRESSURE: 112 MMHG | BODY MASS INDEX: 26.29 KG/M2 | HEART RATE: 88 BPM | HEIGHT: 64 IN

## 2018-08-28 DIAGNOSIS — F41.1 GENERALIZED ANXIETY DISORDER: ICD-10-CM

## 2018-08-28 DIAGNOSIS — Z00.00 ROUTINE GENERAL MEDICAL EXAMINATION AT A HEALTH CARE FACILITY: Primary | ICD-10-CM

## 2018-08-28 DIAGNOSIS — H65.22 CHRONIC SEROUS OTITIS MEDIA, LEFT EAR: ICD-10-CM

## 2018-08-28 LAB
ERYTHROCYTE [DISTWIDTH] IN BLOOD BY AUTOMATED COUNT: 12.2 % (ref 10–15)
HCT VFR BLD AUTO: 39.3 % (ref 35–47)
HGB BLD-MCNC: 13 G/DL (ref 11.7–15.7)
MCH RBC QN AUTO: 30.2 PG (ref 26.5–33)
MCHC RBC AUTO-ENTMCNC: 33.1 G/DL (ref 31.5–36.5)
MCV RBC AUTO: 91 FL (ref 78–100)
PLATELET # BLD AUTO: 236 10E9/L (ref 150–450)
RBC # BLD AUTO: 4.31 10E12/L (ref 3.8–5.2)
WBC # BLD AUTO: 8.3 10E9/L (ref 4–11)

## 2018-08-28 PROCEDURE — 80053 COMPREHEN METABOLIC PANEL: CPT | Performed by: FAMILY MEDICINE

## 2018-08-28 PROCEDURE — 99214 OFFICE O/P EST MOD 30 MIN: CPT | Mod: 25 | Performed by: FAMILY MEDICINE

## 2018-08-28 PROCEDURE — 99396 PREV VISIT EST AGE 40-64: CPT | Performed by: FAMILY MEDICINE

## 2018-08-28 PROCEDURE — 36415 COLL VENOUS BLD VENIPUNCTURE: CPT | Performed by: FAMILY MEDICINE

## 2018-08-28 PROCEDURE — 85027 COMPLETE CBC AUTOMATED: CPT | Performed by: FAMILY MEDICINE

## 2018-08-28 RX ORDER — ESCITALOPRAM OXALATE 5 MG/1
5 TABLET ORAL DAILY
Qty: 30 TABLET | Refills: 1 | Status: SHIPPED | OUTPATIENT
Start: 2018-08-28 | End: 2019-01-18

## 2018-08-28 ASSESSMENT — ANXIETY QUESTIONNAIRES
1. FEELING NERVOUS, ANXIOUS, OR ON EDGE: NOT AT ALL
2. NOT BEING ABLE TO STOP OR CONTROL WORRYING: NOT AT ALL
7. FEELING AFRAID AS IF SOMETHING AWFUL MIGHT HAPPEN: NOT AT ALL
GAD7 TOTAL SCORE: 0
5. BEING SO RESTLESS THAT IT IS HARD TO SIT STILL: NOT AT ALL
IF YOU CHECKED OFF ANY PROBLEMS ON THIS QUESTIONNAIRE, HOW DIFFICULT HAVE THESE PROBLEMS MADE IT FOR YOU TO DO YOUR WORK, TAKE CARE OF THINGS AT HOME, OR GET ALONG WITH OTHER PEOPLE: NOT DIFFICULT AT ALL
3. WORRYING TOO MUCH ABOUT DIFFERENT THINGS: NOT AT ALL
6. BECOMING EASILY ANNOYED OR IRRITABLE: NOT AT ALL

## 2018-08-28 ASSESSMENT — PATIENT HEALTH QUESTIONNAIRE - PHQ9: 5. POOR APPETITE OR OVEREATING: NOT AT ALL

## 2018-08-28 NOTE — PROGRESS NOTES
SUBJECTIVE:   CC: Piper Lisa is an 53 year old woman who presents for preventive health visit.     Healthy Habits:    Do you get at least three servings of calcium containing foods daily (dairy, green leafy vegetables, etc.)? yes    Amount of exercise or daily activities, outside of work: 4-5 day(s) per week    Problems taking medications regularly No    Medication side effects: No    Have you had an eye exam in the past two years? no    Do you see a dentist twice per year? yes    Do you have sleep apnea, excessive snoring or daytime drowsiness?no    Patient has history of anxiety.  Symptoms are well controlled.  She dropped the dose of Lexapro from 20  to 10 mg about 1 month ago and she has done great.  Would like to further drop the dose and stop the medication.  Denies any depression.  She has chronic insomnia problems and does not want to try any medications at this time for that.  She has tried trazodone in the past and over-the-counter medications including melatonin without great response.    Complain of left ear discomfort.  She feels there might be fluid or wax in there.  Denies any pain.  Denies any hearing changes.  Symptoms present for several years now.  No medications tried.  Denies any history of allergies.  No other URI symptoms reported.    Today's PHQ-2 Score:   PHQ-2 ( 1999 Pf[employer]) 8/23/2017 8/23/2016   Q1: Little interest or pleasure in doing things 0 0   Q2: Feeling down, depressed or hopeless 0 0   PHQ-2 Score 0 0       Abuse: Current or Past(Physical, Sexual or Emotional)- No  Do you feel safe in your environment - Yes    Social History   Substance Use Topics     Smoking status: Never Smoker     Smokeless tobacco: Never Used     Alcohol use 1.2 oz/week     2 Standard drinks or equivalent per week      Comment: occasional      If you drink alcohol do you typically have >3 drinks per day or >7 drinks per week? No                     Reviewed orders with patient.  Reviewed health  "maintenance and updated orders accordingly - Yes  Labs reviewed in EPIC  BP Readings from Last 3 Encounters:   18 112/64   07/10/18 112/74   18 111/73    Wt Readings from Last 3 Encounters:   18 154 lb (69.9 kg)   07/10/18 156 lb 3.2 oz (70.9 kg)   18 158 lb (71.7 kg)                  Patient Active Problem List   Diagnosis     Hirsutism     Generalized anxiety disorder     Migraine     Lentigo maligna (H)     Malignant neoplasm of upper-inner quadrant of right female breast (H)     History of melanoma in situ     Past Surgical History:   Procedure Laterality Date     ARTHROSCOPY KNEE RT/LT      right ,degenerative changes joint and meniscus     AS EXC MALIG SKIN LESION TRUNK/ARM/LEG 0.6-1.0 CM      melanoma     BIOPSY NODE SENTINEL Right 2015    Procedure: BIOPSY NODE SENTINEL;  Surgeon: Eliezer Bates MD;  Location: [facility]     COLONOSCOPY N/A 10/14/2016    Procedure: COLONOSCOPY;  Surgeon: Eliezer Bates MD;  Location:  GI      KNEE SCOPE, DIAGNOSTIC      right, medial meniscus injury     LUMPECTOMY BREAST WITH SEED LOCALIZATION Right 2015    Procedure: LUMPECTOMY BREAST WITH SEED LOCALIZATION;  Surgeon: Eliezer Bates MD;  Location: [facility]       Social History   Substance Use Topics     Smoking status: Never Smoker     Smokeless tobacco: Never Used     Alcohol use 1.2 oz/week     2 Standard drinks or equivalent per week      Comment: occasional      Family History   Problem Relation Age of Onset     Cancer Father      ocular melanoma,  of metastatic diseas age 81     Hypertension Father      Lipids Father      Prostate Cancer Father      onset age 70     GASTROINTESTINAL DISEASE Father      \"sensitive stomach\"     Skin Cancer Father      GASTROINTESTINAL DISEASE Mother      cholecystectomy     Eye Disorder Mother      cataract, macular degeneration     Cerebrovascular Disease Mother 85     C.A.D. Maternal Grandmother       of MI age 83     " RICARDA Maternal Grandfather       MI early 60's     CJAYNE Paternal Grandfather       early 50s MI     Breast Cancer Paternal Grandmother       mid 70s     Hypertension Brother          Current Outpatient Prescriptions   Medication Sig Dispense Refill     Calcium Citrate-Vitamin D (CALCIUM + D PO)        escitalopram (LEXAPRO) 5 MG tablet Take 1 tablet (5 mg) by mouth daily 30 tablet 1     multivitamin, therapeutic with minerals (MULTI-VITAMIN) TABS Take 1 tablet by mouth daily       SUMAtriptan (IMITREX) 50 MG tablet Take 1 tablet (50 mg) by mouth at onset of headache for migraine May repeat dose in 2 hours if needed, max daily dose is 200 mg 10 tablet 5     tamoxifen (NOLVADEX) 20 MG tablet Take 1 tablet (20 mg) by mouth daily 90 tablet 3     [DISCONTINUED] escitalopram (LEXAPRO) 10 MG tablet Take 1 tablet (10 mg) by mouth daily 30 tablet 3     Allergies   Allergen Reactions     No Clinical Screening - See Comments Rash     metal       Patient over age 50, mutual decision to screen reflected in health maintenance.    Pertinent mammograms are reviewed under the imaging tab.  History of abnormal Pap smear: NO - age 30- 65 PAP every 3 years recommended  PAP / HPV Latest Ref Rng & Units 2017   PAP - NIL NIL NIL   HPV 16 DNA NEG:Negative Negative Negative -   HPV 18 DNA NEG:Negative Negative Negative -   OTHER HR HPV NEG:Negative Negative Negative -     Reviewed and updated as needed this visit by clinical staff  Tobacco  Allergies  Meds  Problems  Med Hx  Surg Hx  Fam Hx  Soc Hx          Reviewed and updated as needed this visit by Provider  Allergies  Meds  Problems            ROS:  CONSTITUTIONAL: NEGATIVE for fever, chills, change in weight  INTEGUMENTARU/SKIN: NEGATIVE for worrisome rashes, moles or lesions  EYES: NEGATIVE for vision changes or irritation  ENT: NEGATIVE for ear, mouth and throat problems  RESP: NEGATIVE for significant cough or SOB  BREAST: NEGATIVE  "for masses, tenderness or discharge  CV: NEGATIVE for chest pain, palpitations or peripheral edema  GI: NEGATIVE for nausea, abdominal pain, heartburn, or change in bowel habits  : NEGATIVE for unusual urinary or vaginal symptoms. Periods are regular.  MUSCULOSKELETAL: NEGATIVE for significant arthralgias or myalgia  NEURO: NEGATIVE for weakness, dizziness or paresthesias  PSYCHIATRIC: NEGATIVE for changes in mood or affect    OBJECTIVE:   /64  Pulse 88  Temp 98.6  F (37  C) (Tympanic)  Ht 5' 3.98\" (1.625 m)  Wt 154 lb (69.9 kg)  LMP 07/15/2018  SpO2 98%  BMI 26.45 kg/m2  EXAM:  GENERAL: healthy, alert and no distress  EYES: Eyes grossly normal to inspection, PERRL and conjunctivae and sclerae normal  HENT: ear canals and TM is normal on the right side.  Mild amount of serous fluid noted on the left. , nose and mouth without ulcers or lesions  NECK: no adenopathy, no asymmetry, masses, or scars and thyroid normal to palpation  RESP: lungs clear to auscultation - no rales, rhonchi or wheezes  BREAST: normal without masses, tenderness or nipple discharge and no palpable axillary masses or adenopathy  CV: regular rate and rhythm, normal S1 S2, no S3 or S4, no murmur, click or rub, no peripheral edema and peripheral pulses strong  ABDOMEN: soft, nontender, no hepatosplenomegaly, no masses and bowel sounds normal  MS: no gross musculoskeletal defects noted, no edema  SKIN: no suspicious lesions or rashes  NEURO: Normal strength and tone, mentation intact and speech normal  PSYCH: mentation appears normal, affect normal/bright        ASSESSMENT/PLAN:   1. Routine general medical examination at a health care facility  Screening labs ordered.  Patient referred to see dermatology as she has history of melanoma in the past  - Comprehensive metabolic panel  - CBC with platelets  - DERMATOLOGY REFERRAL    2. Generalized anxiety disorder  Well-controlled.  Recommending to slowly gradually stop the medication.  " "Plan of weaning off of the medication in the next 1 month recommended  - escitalopram (LEXAPRO) 5 MG tablet; Take 1 tablet (5 mg) by mouth daily  Dispense: 30 tablet; Refill: 1    3. Chronic serous otitis media, left ear  May try over-the-counter antihistamine for 2 weeks to see if that helps.    COUNSELING:   Reviewed preventive health counseling, as reflected in patient instructions       Regular exercise       Healthy diet/nutrition    BP Readings from Last 1 Encounters:   08/28/18 112/64     Estimated body mass index is 26.45 kg/(m^2) as calculated from the following:    Height as of this encounter: 5' 3.98\" (1.625 m).    Weight as of this encounter: 154 lb (69.9 kg).      Weight management plan: Discussed healthy diet and exercise guidelines and patient will follow up in 12 months in clinic to re-evaluate.     reports that she has never smoked. She has never used smokeless tobacco.      Counseling Resources:  ATP IV Guidelines  Pooled Cohorts Equation Calculator  Breast Cancer Risk Calculator  FRAX Risk Assessment  ICSI Preventive Guidelines  Dietary Guidelines for Americans, 2010  USDA's MyPlate  ASA Prophylaxis  Lung CA Screening    Maribel Serna MD  [facility]  "

## 2018-08-29 LAB
ALBUMIN SERPL-MCNC: 4.2 G/DL (ref 3.4–5)
ALP SERPL-CCNC: 52 U/L (ref 40–150)
ALT SERPL W P-5'-P-CCNC: 19 U/L (ref 0–50)
ANION GAP SERPL CALCULATED.3IONS-SCNC: 8 MMOL/L (ref 3–14)
AST SERPL W P-5'-P-CCNC: 12 U/L (ref 0–45)
BILIRUB SERPL-MCNC: 0.4 MG/DL (ref 0.2–1.3)
BUN SERPL-MCNC: 16 MG/DL (ref 7–30)
CALCIUM SERPL-MCNC: 9 MG/DL (ref 8.5–10.1)
CHLORIDE SERPL-SCNC: 106 MMOL/L (ref 94–109)
CO2 SERPL-SCNC: 25 MMOL/L (ref 20–32)
CREAT SERPL-MCNC: 0.99 MG/DL (ref 0.52–1.04)
GFR SERPL CREATININE-BSD FRML MDRD: 58 ML/MIN/1.7M2
GLUCOSE SERPL-MCNC: 93 MG/DL (ref 70–99)
POTASSIUM SERPL-SCNC: 4.1 MMOL/L (ref 3.4–5.3)
PROT SERPL-MCNC: 7.8 G/DL (ref 6.8–8.8)
SODIUM SERPL-SCNC: 139 MMOL/L (ref 133–144)

## 2018-08-29 ASSESSMENT — ANXIETY QUESTIONNAIRES: GAD7 TOTAL SCORE: 0

## 2018-08-29 ASSESSMENT — PATIENT HEALTH QUESTIONNAIRE - PHQ9: SUM OF ALL RESPONSES TO PHQ QUESTIONS 1-9: 3

## 2018-10-05 ENCOUNTER — HOSPITAL ENCOUNTER (OUTPATIENT)
Dept: MAMMOGRAPHY | Facility: CLINIC | Age: 53
Discharge: HOME | End: 2018-10-05
Attending: INTERNAL MEDICINE | Admitting: INTERNAL MEDICINE
Payer: COMMERCIAL

## 2018-10-05 DIAGNOSIS — Z12.39 BREAST SCREENING: ICD-10-CM

## 2018-10-05 PROCEDURE — 77063 BREAST TOMOSYNTHESIS BI: CPT

## 2018-10-14 ENCOUNTER — MYC REFILL (OUTPATIENT)
Dept: ONCOLOGY | Facility: CLINIC | Age: 53
End: 2018-10-14

## 2018-10-14 DIAGNOSIS — Z17.0 MALIGNANT NEOPLASM OF UPPER-INNER QUADRANT OF RIGHT BREAST IN FEMALE, ESTROGEN RECEPTOR POSITIVE (H): ICD-10-CM

## 2018-10-14 DIAGNOSIS — C50.211 MALIGNANT NEOPLASM OF UPPER-INNER QUADRANT OF RIGHT BREAST IN FEMALE, ESTROGEN RECEPTOR POSITIVE (H): ICD-10-CM

## 2018-10-14 RX ORDER — TAMOXIFEN CITRATE 20 MG/1
20 TABLET ORAL DAILY
Qty: 90 TABLET | Refills: 3 | Status: CANCELLED | OUTPATIENT
Start: 2018-10-14

## 2019-01-15 ENCOUNTER — MYC REFILL (OUTPATIENT)
Dept: ONCOLOGY | Facility: CLINIC | Age: 54
End: 2019-01-15

## 2019-01-15 DIAGNOSIS — Z17.0 MALIGNANT NEOPLASM OF UPPER-INNER QUADRANT OF RIGHT BREAST IN FEMALE, ESTROGEN RECEPTOR POSITIVE (H): ICD-10-CM

## 2019-01-15 DIAGNOSIS — C50.211 MALIGNANT NEOPLASM OF UPPER-INNER QUADRANT OF RIGHT BREAST IN FEMALE, ESTROGEN RECEPTOR POSITIVE (H): ICD-10-CM

## 2019-01-16 RX ORDER — TAMOXIFEN CITRATE 20 MG/1
20 TABLET ORAL DAILY
Qty: 90 TABLET | Refills: 3 | Status: SHIPPED | OUTPATIENT
Start: 2019-01-16 | End: 2019-01-26

## 2019-01-18 ENCOUNTER — ONCOLOGY VISIT (OUTPATIENT)
Dept: ONCOLOGY | Facility: CLINIC | Age: 54
End: 2019-01-18
Attending: INTERNAL MEDICINE
Payer: COMMERCIAL

## 2019-01-18 ENCOUNTER — ALLIED HEALTH/NURSE VISIT (OUTPATIENT)
Dept: ONCOLOGY | Facility: CLINIC | Age: 54
End: 2019-01-18

## 2019-01-18 VITALS
DIASTOLIC BLOOD PRESSURE: 75 MMHG | HEIGHT: 64 IN | RESPIRATION RATE: 18 BRPM | BODY MASS INDEX: 26.15 KG/M2 | OXYGEN SATURATION: 99 % | TEMPERATURE: 98.4 F | HEART RATE: 71 BPM | WEIGHT: 153.2 LBS | SYSTOLIC BLOOD PRESSURE: 116 MMHG

## 2019-01-18 DIAGNOSIS — Z17.0 MALIGNANT NEOPLASM OF UPPER-INNER QUADRANT OF RIGHT BREAST IN FEMALE, ESTROGEN RECEPTOR POSITIVE (H): Primary | ICD-10-CM

## 2019-01-18 DIAGNOSIS — Z71.9 COUNSELING NOS(V65.40): Primary | ICD-10-CM

## 2019-01-18 DIAGNOSIS — C50.211 MALIGNANT NEOPLASM OF UPPER-INNER QUADRANT OF RIGHT BREAST IN FEMALE, ESTROGEN RECEPTOR POSITIVE (H): Primary | ICD-10-CM

## 2019-01-18 PROCEDURE — G0463 HOSPITAL OUTPT CLINIC VISIT: HCPCS

## 2019-01-18 PROCEDURE — 99214 OFFICE O/P EST MOD 30 MIN: CPT | Performed by: INTERNAL MEDICINE

## 2019-01-18 ASSESSMENT — MIFFLIN-ST. JEOR: SCORE: 1284.59

## 2019-01-18 ASSESSMENT — PAIN SCALES - GENERAL: PAINLEVEL: NO PAIN (0)

## 2019-01-18 NOTE — PROGRESS NOTES
"Oncology Distress Screening Follow-up  Clinical Social Work  [facility]    Identified Concern and Score From Distress Screenin. How concerned are you about your ability to eat?   0        2. How concerned are you about unintended weight loss or your current weight?   0        3. How concerned are you about feeling depressed or very sad?   7 Abnormal         4. How concerned are you about feeling anxious or very scared?   0        5. Do you struggle with the loss of meaning and sydnie in your life?       Somewhat        6. How concerned are you about work and home life issues that may be affected by your cancer?   5        7. How concerned are you about knowing what resources are available to help you?             Date of Distress Screenin19    Intervention:   Piper is a 53-year-old woman who has a diagnosis of right invasive ductal carcinoma of breast s/p lumpectomy on 9/14/15, stage IA. She started tamoxifen 2015 and plans to continue it for 10 years in for visits with providers every 6 months. This clinician met briefly with pt today due to positive distress screen. Piper reports that she used to take medication to assist with depression, but had made decision to transition from medication for mood support. Piper acknowledges that she is in a point where she is working to make decision as to whether or not she would like to reengage with mood related medication or not. Piper reports that depression is not related to cancer, and reports \"I don't think about cancer at all unless I come in here.\" Piper reports that she is a  for middle school, and reports that she is aware of how to reach out for community support as needed. Oriented to oncology support services available as needed. Piper denies need at present time, but has SW contact information for future as needed. Collaborated with oncologist surround pt care needs.     Follow-up Required:   No further SW intervention needed at present time. " Please page in the case of psychosocial concern or need.     ALBARO Grigsby, LICSW  Phone: [phone]  Pager: [phone]    Kimberley Arnold: M, T  *every other Thursday, 8am-4:30pm  Kimberley Craft: W, F, *every other Thursday, 8am-4:30pm

## 2019-01-18 NOTE — PROGRESS NOTES
[facility]    Hematology/Oncology Established Patient Note      Today's Date: 1/18/2019    Reason for Follow-up: Right invasive ductal carcinoma of breast s/p lumpectomy on 9/14/15, grade 1, 1.5 cm size tumor, negative margins, no LVI, negative lymph nodes, ER strongly positive, TX strongly positive, HER2 negative, pT1cN0, stage IA      HISTORY OF PRESENT ILLNESS: Piper Jacskon is a 53 year old pre-menopausal female who presented with newly diagnosed right-sided breast cancer.  Piper underwent her regular bilateral screening mammogram on 8/18/15, where a focal asymmetry in the right upper inner breast was found.  She was not having any symptoms at the time.  She denies feeling a breast lump/bump; no rash or nipple discharge.  An ultrasound was done, which found a 1.2 cm heterogenous focal lesion at the 1:00 position, 5 cm from the nipple.       She underwent lumpectomy on 9/14/15, pathology showed grade 1, 1.5 cm size tumor, negative margins, no LVI, negative lymph nodes, ER strongly positive, TX strongly positive, HER2 negative, pT1cN0, stage IA.  Oncotype DX score is 12.  She completed radiation treatment 10/19/15-12/1/15.  She started tamoxifen on 12/5/15.      INTERIM HISTORY: Piper comes in for follow-up today.   She says that she is feeling well. She has tapered off of the Lexapro with her PCP.  She denies new breast lumps/bumps or new pains.      REVIEW OF SYSTEMS:   14 point ROS was reviewed and is negative other than as noted above in HPI.       HOME MEDICATIONS:  Current Outpatient Medications   Medication Sig Dispense Refill     Calcium Citrate-Vitamin D (CALCIUM + D PO)        multivitamin, therapeutic with minerals (MULTI-VITAMIN) TABS Take 1 tablet by mouth daily       SUMAtriptan (IMITREX) 50 MG tablet Take 1 tablet (50 mg) by mouth at onset of headache for migraine May repeat dose in 2 hours if needed, max daily dose is 200 mg 10 tablet 5     tamoxifen (NOLVADEX) 20 MG tablet Take  1 tablet (20 mg) by mouth daily 90 tablet 3         ALLERGIES:  Allergies   Allergen Reactions     No Clinical Screening - See Comments Rash     metal         PAST MEDICAL HISTORY:  Past Medical History:   Diagnosis Date     Generalized anxiety disorder 2002     Hirsutism     facial hair     Migraine          PAST SURGICAL HISTORY:  Past Surgical History:   Procedure Laterality Date     ARTHROSCOPY KNEE RT/LT  2007    right ,degenerative changes joint and meniscus     AS EXC MALIG SKIN LESION TRUNK/ARM/LEG 0.6-1.0 CM      melanoma     BIOPSY NODE SENTINEL Right 9/14/2015    Procedure: BIOPSY NODE SENTINEL;  Surgeon: Eliezer Bates MD;  Location: [facility]     COLONOSCOPY N/A 10/14/2016    Procedure: COLONOSCOPY;  Surgeon: Eilezer Bates MD;  Location:  GI     HC KNEE SCOPE, DIAGNOSTIC  1985    right, medial meniscus injury     LUMPECTOMY BREAST WITH SEED LOCALIZATION Right 9/14/2015    Procedure: LUMPECTOMY BREAST WITH SEED LOCALIZATION;  Surgeon: Eliezer Bates MD;  Location: [facility]         SOCIAL HISTORY:  Social History     Socioeconomic History     Marital status:      Spouse name: Jonah     Number of children: 0     Years of education: 18     Highest education level: Not on file   Social Needs     Financial resource strain: Not on file     Food insecurity - worry: Not on file     Food insecurity - inability: Not on file     Transportation needs - medical: Not on file     Transportation needs - non-medical: Not on file   Occupational History     Occupation:      Employer: [employer]     Comment: masters in social work   Tobacco Use     Smoking status: Never Smoker     Smokeless tobacco: Never Used   Substance and Sexual Activity     Alcohol use: Yes     Alcohol/week: 1.2 oz     Types: 2 Standard drinks or equivalent per week     Comment: occasional      Drug use: No     Sexual activity: Yes     Partners: Male     Comment: same relationship since 2000, 3 partners  "lifelong   Other Topics Concern      Service No     Blood Transfusions No     Caffeine Concern No     Comment: 3 a week     Occupational Exposure No     Hobby Hazards No     Sleep Concern No     Comment: 6 hrs     Stress Concern No     Comment: low     Weight Concern No     Special Diet No     Comment: calcium/ high milk intake 2-3 glasses per day     Back Care Yes     Comment: lower back aches     Exercise Yes     Comment: running/weights 50-60 min 5-6 days per week     Bike Helmet Yes     Seat Belt Yes     Self-Exams No     Parent/sibling w/ CABG, MI or angioplasty before 65F 55M? Not Asked   Social History Narrative    Lives with .  Desires no children. Has a dog.  She works as a .         FAMILY HISTORY:  Family History   Problem Relation Age of Onset     Cancer Father         ocular melanoma,  of metastatic diseas age 81     Hypertension Father      Lipids Father      Prostate Cancer Father         onset age 70     Gastrointestinal Disease Father         \"sensitive stomach\"     Skin Cancer Father      Gastrointestinal Disease Mother         cholecystectomy     Eye Disorder Mother         cataract, macular degeneration     Cerebrovascular Disease Mother 85     C.A.D. Maternal Grandmother          of MI age 83     C.A.D. Maternal Grandfather          MI early 60's     C.A.D. Paternal Grandfather          early 50s MI     Breast Cancer Paternal Grandmother          mid 70s     Hypertension Brother          PHYSICAL EXAM:  Vital signs:  /75 (BP Location: Left arm, Patient Position: Sitting, Cuff Size: Adult Regular)   Pulse 71   Temp 98.4  F (36.9  C) (Oral)   Resp 18   Ht 1.625 m (5' 3.98\")   Wt 69.5 kg (153 lb 3.2 oz)   SpO2 99%   BMI 26.31 kg/m     ECO  GENERAL/CONSTITUTIONAL: No acute distress.  EYES: No scleral icterus.  LYMPH: No anterior cervical, posterior cervical, supraclavicular, or axillary adenopathy.   RESPIRATORY: Clear to " auscultation bilaterally. No crackles or wheezing.   CARDIOVASCULAR: Regular rate and rhythm without murmurs, gallops, or rubs.  GASTROINTESTINAL: No tenderness. The patient has normal bowel sounds. No guarding.  No distention.  BREAST: Right-s/p lumpectomy with slight firmness of scar tissue around the site; otherwise, no palpable mass, discharge, rash, or axillary lymphadenopathy;  Left-no palpable mass, discharge, rash, or axillary lymphadenopathy.   MUSCULOSKELETAL: Warm and well-perfused, no cyanosis, clubbing, or edema.  NEUROLOGIC: Alert, oriented, answers questions appropriately.  INTEGUMENTARY: No rashes or jaundice.  GAIT: Steady, does not use assistive device      LABS:  None today.      IMAGING:  Bilateral mammogram 10/05/18:  COMMENTS: No findings of suspicion for malignancy.          IMPRESSION: BI-RADS CATEGORY: 1 -  Negative      ASSESSMENT/PLAN:  Piper Jackson is a 53 year old pre-menopausal, otherwise healthy, female:    1) Invasive ductal carcinoma of right upper inner breast: s/p lumpectomy on 9/14/15, pathology showed grade 1, 1.5 cm size tumor, negative margins, no LVI, negative lymph nodes, ER strongly positive, MS strongly positive, HER2 negative, pT1cN0, stage IA. Oncotype DX score is 12.  She completed radiation 10/19/15-12/1/15.  She started tamoxifen on 12/5/15.      There has been no evidence of recurrence on exam or mammogram.  She is tolerating tamoxifen well and will continue.      -Continue tamoxifen - plan to treat for 10 years, if can tolerate.   -RTC in 6 months  -next bilateral screening mammogram in October 2019 - will order at the next visit    2) Melanoma in situ: s/p excision at left lateral lower leg  -continue follow-up with dermatology    3) Colon screening: She had screening colonoscopy 10/14/16.  It was normal.  Next colonoscopy was recommended for 10 year from then.    4) Generalized anxiety disorder: She has tapered off of Lexapro.  -she follows with her PCP      I  spent a total of 25 minutes with the patient, with over >50% of the time in counseling and/or coordination of care.      Fabiola Kate MD  Hematology/Oncology  [facility]

## 2019-01-18 NOTE — LETTER
"    1/18/2019         RE: Piper Lisa  [address]  [facility] [address]        Dear Colleague,    Thank you for referring your patient, Piper Lisa, to the [facility]. Please see a copy of my visit note below.    Oncology Rooming Note    January 18, 2019 1:07 PM   Piper Lisa is a 53 year old female who presents for:    Chief Complaint   Patient presents with     Oncology Clinic Visit     Malignant neoplasm of upper-inner quadrant of right female breast (H)     Initial Vitals: There were no vitals taken for this visit. Estimated body mass index is 26.45 kg/m  as calculated from the following:    Height as of 8/28/18: 1.625 m (5' 3.98\").    Weight as of 8/28/18: 69.9 kg (154 lb). There is no height or weight on file to calculate BSA.  Data Unavailable Comment: Data Unavailable   No LMP recorded.  Allergies reviewed: Yes  Medications reviewed: Yes    Medications: Medication refills not needed today.  Pharmacy name entered into [facility]:    [id number] - [place] [address] [facility]  [facility] - [place], [place] - [address]    Clinical concerns:no      5 minutes for nursing intake (face to face time)     Yamilet Stratton, [facility]    Hematology/Oncology Established Patient Note      Today's Date: 1/18/2019    Reason for Follow-up: Right invasive ductal carcinoma of breast s/p lumpectomy on 9/14/15, grade 1, 1.5 cm size tumor, negative margins, no LVI, negative lymph nodes, ER strongly positive, NY strongly positive, HER2 negative, pT1cN0, stage IA      HISTORY OF PRESENT ILLNESS: Piper Jackson is a 53 year old pre-menopausal female who presented with newly diagnosed right-sided breast cancer.  Piper underwent her regular bilateral screening mammogram on 8/18/15, where a focal asymmetry in the right upper inner breast was found.  She " was not having any symptoms at the time.  She denies feeling a breast lump/bump; no rash or nipple discharge.  An ultrasound was done, which found a 1.2 cm heterogenous focal lesion at the 1:00 position, 5 cm from the nipple.       She underwent lumpectomy on 9/14/15, pathology showed grade 1, 1.5 cm size tumor, negative margins, no LVI, negative lymph nodes, ER strongly positive, DE strongly positive, HER2 negative, pT1cN0, stage IA.  Oncotype DX score is 12.  She completed radiation treatment 10/19/15-12/1/15.  She started tamoxifen on 12/5/15.      INTERIM HISTORY: Piper comes in for follow-up today.   She says that she is feeling well. She has tapered off of the Lexapro with her PCP.  She denies new breast lumps/bumps or new pains.      REVIEW OF SYSTEMS:   14 point ROS was reviewed and is negative other than as noted above in HPI.       HOME MEDICATIONS:  Current Outpatient Medications   Medication Sig Dispense Refill     Calcium Citrate-Vitamin D (CALCIUM + D PO)        multivitamin, therapeutic with minerals (MULTI-VITAMIN) TABS Take 1 tablet by mouth daily       SUMAtriptan (IMITREX) 50 MG tablet Take 1 tablet (50 mg) by mouth at onset of headache for migraine May repeat dose in 2 hours if needed, max daily dose is 200 mg 10 tablet 5     tamoxifen (NOLVADEX) 20 MG tablet Take 1 tablet (20 mg) by mouth daily 90 tablet 3         ALLERGIES:  Allergies   Allergen Reactions     No Clinical Screening - See Comments Rash     metal         PAST MEDICAL HISTORY:  Past Medical History:   Diagnosis Date     Generalized anxiety disorder 2002     Hirsutism     facial hair     Migraine          PAST SURGICAL HISTORY:  Past Surgical History:   Procedure Laterality Date     ARTHROSCOPY KNEE RT/LT  2007    right ,degenerative changes joint and meniscus     AS EXC MALIG SKIN LESION TRUNK/ARM/LEG 0.6-1.0 CM      melanoma     BIOPSY NODE SENTINEL Right 9/14/2015    Procedure: BIOPSY NODE SENTINEL;  Surgeon: Eliezer Bates,  MD;  Location: [facility]     COLONOSCOPY N/A 10/14/2016    Procedure: COLONOSCOPY;  Surgeon: Eliezer Bates MD;  Location:  GI     HC KNEE SCOPE, DIAGNOSTIC  1985    right, medial meniscus injury     LUMPECTOMY BREAST WITH SEED LOCALIZATION Right 9/14/2015    Procedure: LUMPECTOMY BREAST WITH SEED LOCALIZATION;  Surgeon: Eliezer Bates MD;  Location: [facility]         SOCIAL HISTORY:  Social History     Socioeconomic History     Marital status:      Spouse name: Jonah     Number of children: 0     Years of education: 18     Highest education level: Not on file   Social Needs     Financial resource strain: Not on file     Food insecurity - worry: Not on file     Food insecurity - inability: Not on file     Transportation needs - medical: Not on file     Transportation needs - non-medical: Not on file   Occupational History     Occupation:      Employer: [employer]     Comment: masters in social work   Tobacco Use     Smoking status: Never Smoker     Smokeless tobacco: Never Used   Substance and Sexual Activity     Alcohol use: Yes     Alcohol/week: 1.2 oz     Types: 2 Standard drinks or equivalent per week     Comment: occasional      Drug use: No     Sexual activity: Yes     Partners: Male     Comment: same relationship since 2000, 3 partners lifelong   Other Topics Concern      Service No     Blood Transfusions No     Caffeine Concern No     Comment: 3 a week     Occupational Exposure No     Hobby Hazards No     Sleep Concern No     Comment: 6 hrs     Stress Concern No     Comment: low     Weight Concern No     Special Diet No     Comment: calcium/ high milk intake 2-3 glasses per day     Back Care Yes     Comment: lower back aches     Exercise Yes     Comment: running/weights 50-60 min 5-6 days per week     Bike Helmet Yes     Seat Belt Yes     Self-Exams No     Parent/sibling w/ CABG, MI or angioplasty before 65F 55M? Not Asked   Social History Narrative    Lives  "with .  Desires no children. Has a dog.  She works as a .         FAMILY HISTORY:  Family History   Problem Relation Age of Onset     Cancer Father         ocular melanoma,  of metastatic diseas age 81     Hypertension Father      Lipids Father      Prostate Cancer Father         onset age 70     Gastrointestinal Disease Father         \"sensitive stomach\"     Skin Cancer Father      Gastrointestinal Disease Mother         cholecystectomy     Eye Disorder Mother         cataract, macular degeneration     Cerebrovascular Disease Mother 85     C.A.D. Maternal Grandmother          of MI age 83     C.A.D. Maternal Grandfather          MI early 60's     C.A.D. Paternal Grandfather          early 50s MI     Breast Cancer Paternal Grandmother          mid 70s     Hypertension Brother          PHYSICAL EXAM:  Vital signs:  /75 (BP Location: Left arm, Patient Position: Sitting, Cuff Size: Adult Regular)   Pulse 71   Temp 98.4  F (36.9  C) (Oral)   Resp 18   Ht 1.625 m (5' 3.98\")   Wt 69.5 kg (153 lb 3.2 oz)   SpO2 99%   BMI 26.31 kg/m      ECO  GENERAL/CONSTITUTIONAL: No acute distress.  EYES: No scleral icterus.  LYMPH: No anterior cervical, posterior cervical, supraclavicular, or axillary adenopathy.   RESPIRATORY: Clear to auscultation bilaterally. No crackles or wheezing.   CARDIOVASCULAR: Regular rate and rhythm without murmurs, gallops, or rubs.  GASTROINTESTINAL: No tenderness. The patient has normal bowel sounds. No guarding.  No distention.  BREAST: Right-s/p lumpectomy with slight firmness of scar tissue around the site; otherwise, no palpable mass, discharge, rash, or axillary lymphadenopathy;  Left-no palpable mass, discharge, rash, or axillary lymphadenopathy.   MUSCULOSKELETAL: Warm and well-perfused, no cyanosis, clubbing, or edema.  NEUROLOGIC: Alert, oriented, answers questions appropriately.  INTEGUMENTARY: No rashes or jaundice.  GAIT: Steady, " does not use assistive device      LABS:  None today.      IMAGING:  Bilateral mammogram 10/05/18:  COMMENTS: No findings of suspicion for malignancy.          IMPRESSION: BI-RADS CATEGORY: 1 -  Negative      ASSESSMENT/PLAN:  Piper Jackson is a 53 year old pre-menopausal, otherwise healthy, female:    1) Invasive ductal carcinoma of right upper inner breast: s/p lumpectomy on 9/14/15, pathology showed grade 1, 1.5 cm size tumor, negative margins, no LVI, negative lymph nodes, ER strongly positive, VT strongly positive, HER2 negative, pT1cN0, stage IA. Oncotype DX score is 12.  She completed radiation 10/19/15-12/1/15.  She started tamoxifen on 12/5/15.      There has been no evidence of recurrence on exam or mammogram.  She is tolerating tamoxifen well and will continue.      -Continue tamoxifen - plan to treat for 10 years, if can tolerate.   -RTC in 6 months  -next bilateral screening mammogram in October 2019 - will order at the next visit    2) Melanoma in situ: s/p excision at left lateral lower leg  -continue follow-up with dermatology    3) Colon screening: She had screening colonoscopy 10/14/16.  It was normal.  Next colonoscopy was recommended for 10 year from then.    4) Generalized anxiety disorder: She has tapered off of Lexapro.  -she follows with her PCP      I spent a total of 25 minutes with the patient, with over >50% of the time in counseling and/or coordination of care.      Fabiola Kate MD  Hematology/Oncology  [facility]        Again, thank you for allowing me to participate in the care of your patient.        Sincerely,        Fabiola Kate MD

## 2019-01-18 NOTE — PROGRESS NOTES
"Oncology Rooming Note    January 18, 2019 1:07 PM   Piper Lisa is a 53 year old female who presents for:    Chief Complaint   Patient presents with     Oncology Clinic Visit     Malignant neoplasm of upper-inner quadrant of right female breast (H)     Initial Vitals: There were no vitals taken for this visit. Estimated body mass index is 26.45 kg/m  as calculated from the following:    Height as of 8/28/18: 1.625 m (5' 3.98\").    Weight as of 8/28/18: 69.9 kg (154 lb). There is no height or weight on file to calculate BSA.  Data Unavailable Comment: Data Unavailable   No LMP recorded.  Allergies reviewed: Yes  Medications reviewed: Yes    Medications: Medication refills not needed today.  Pharmacy name entered into [facility]:    [id number] [address] AT [place] [address] & [facility]  [facility] - [place], [place] - [address]    Clinical concerns:no      5 minutes for nursing intake (face to face time)     Yamilet Stratton, [facility]            "

## 2019-01-26 ENCOUNTER — MYC REFILL (OUTPATIENT)
Dept: ONCOLOGY | Facility: CLINIC | Age: 54
End: 2019-01-26

## 2019-01-26 DIAGNOSIS — Z17.0 MALIGNANT NEOPLASM OF UPPER-INNER QUADRANT OF RIGHT BREAST IN FEMALE, ESTROGEN RECEPTOR POSITIVE (H): ICD-10-CM

## 2019-01-26 DIAGNOSIS — C50.211 MALIGNANT NEOPLASM OF UPPER-INNER QUADRANT OF RIGHT BREAST IN FEMALE, ESTROGEN RECEPTOR POSITIVE (H): ICD-10-CM

## 2019-01-28 ENCOUNTER — TELEPHONE (OUTPATIENT)
Dept: ONCOLOGY | Facility: CLINIC | Age: 54
End: 2019-01-28

## 2019-01-28 RX ORDER — TAMOXIFEN CITRATE 20 MG/1
20 TABLET ORAL DAILY
Qty: 90 TABLET | Refills: 3 | Status: SHIPPED | OUTPATIENT
Start: 2019-01-28 | End: 2019-02-12

## 2019-01-28 NOTE — TELEPHONE ENCOUNTER
Piper called clinic back, she is aware that script for Tamoxifen [facility]. Patient stated that recently she has noticed feeling more teary and weepy lately. Informed patient that this is a side effect from Tamoxifen. Consulted with ORLY Kong in clinic who recommended holding Tamoxifen and consulting with her primary care physician for depression. Patient stated that she has been on Lexapro in the past. Writer will have [facility] research what anti-depressants can be taken with Tamoxifen. Writer will also consult with Dr. Kate next week when she is back in clinic and contact patient to see if holding Tamoxifen helps with her feeling so tearful.

## 2019-01-28 NOTE — TELEPHONE ENCOUNTER
Consulted with [facility] and medications that do not interact with Tamoxifen include Zoloft, Lexapro, Celexa, and Effexor. Prozac has been found to interact with Tamoxifen. Called Piper and she was informed of this. She is also aware that her primary care physician can also contact our clinic with any questions. Cleo Ferguson RN,BSN,OCN

## 2019-01-28 NOTE — TELEPHONE ENCOUNTER
Patient called left message on voice mail that she needs a refill of Tamoxifen. Refill was sent Friday 1/25/19 to  [facility]. Left patient message to call and let writer know if she needs a short term refill. Cleo Ferguson RN,BSN,OCN

## 2019-02-05 NOTE — TELEPHONE ENCOUNTER
Called yesica left message on her voice mail for her to call clinic back to see if she is feeling better with her depression off the Tamoxifen and if she was able to make an appointment with her primary care physician. Cleo Ferguson RN,BSN,OCN

## 2019-02-06 NOTE — TELEPHONE ENCOUNTER
Piper called and has an appointment with PCP tomorrow 2/7. Patient is wondering what her options are? She feels she is post menopausal and would rather not take an another medication such as an antidepressant unless it is her only option.     Patient would like a call back at [phone].    Message routed. Jacki Mathew

## 2019-02-06 NOTE — TELEPHONE ENCOUNTER
"Piper called back stating that she does feel better off Tamoxifen. She states that her hot flashes have gone away,and that she feels more light-hearted and that she is in a \"better space\". Questioned when her her last menstrual cycle was and she stated that it was in 10/2018 and had been irregular prior to that. She is seeing her primary Dr. Serna ([facility]) tomorrow. Piper stated that she would rather try a different anti-estrogen rather than start an anti-depressant. Suggested to Piper to have Dr. Serna test her estrogen levels to see if she is in menopause and then she could possibly start an Aromatase inhibitor. Writer will route to Dr. Kate and follow up. Cleo Ferguson RN,BSN,OCN    "

## 2019-02-12 ENCOUNTER — OFFICE VISIT (OUTPATIENT)
Dept: FAMILY MEDICINE | Facility: CLINIC | Age: 54
End: 2019-02-12
Payer: COMMERCIAL

## 2019-02-12 VITALS
OXYGEN SATURATION: 98 % | DIASTOLIC BLOOD PRESSURE: 78 MMHG | HEART RATE: 84 BPM | SYSTOLIC BLOOD PRESSURE: 110 MMHG | BODY MASS INDEX: 26.07 KG/M2 | RESPIRATION RATE: 14 BRPM | WEIGHT: 151.8 LBS | TEMPERATURE: 99 F

## 2019-02-12 DIAGNOSIS — F41.1 GENERALIZED ANXIETY DISORDER: Primary | ICD-10-CM

## 2019-02-12 DIAGNOSIS — N91.2 AMENORRHEA: ICD-10-CM

## 2019-02-12 LAB
ESTRADIOL SERPL-MCNC: 300 PG/ML
FSH SERPL-ACNC: 5.8 IU/L
LH SERPL-ACNC: 11 IU/L

## 2019-02-12 PROCEDURE — 84443 ASSAY THYROID STIM HORMONE: CPT | Performed by: FAMILY MEDICINE

## 2019-02-12 PROCEDURE — 83001 ASSAY OF GONADOTROPIN (FSH): CPT | Performed by: FAMILY MEDICINE

## 2019-02-12 PROCEDURE — 90750 HZV VACC RECOMBINANT IM: CPT | Performed by: FAMILY MEDICINE

## 2019-02-12 PROCEDURE — 82670 ASSAY OF TOTAL ESTRADIOL: CPT | Performed by: FAMILY MEDICINE

## 2019-02-12 PROCEDURE — 99214 OFFICE O/P EST MOD 30 MIN: CPT | Mod: 25 | Performed by: FAMILY MEDICINE

## 2019-02-12 PROCEDURE — 90471 IMMUNIZATION ADMIN: CPT | Performed by: FAMILY MEDICINE

## 2019-02-12 PROCEDURE — 83002 ASSAY OF GONADOTROPIN (LH): CPT | Performed by: FAMILY MEDICINE

## 2019-02-12 PROCEDURE — 36415 COLL VENOUS BLD VENIPUNCTURE: CPT | Performed by: FAMILY MEDICINE

## 2019-02-12 RX ORDER — ESCITALOPRAM OXALATE 10 MG/1
10 TABLET ORAL DAILY
Qty: 90 TABLET | Refills: 1 | Status: SHIPPED | OUTPATIENT
Start: 2019-02-12 | End: 2019-02-12

## 2019-02-12 ASSESSMENT — ANXIETY QUESTIONNAIRES
5. BEING SO RESTLESS THAT IT IS HARD TO SIT STILL: NOT AT ALL
7. FEELING AFRAID AS IF SOMETHING AWFUL MIGHT HAPPEN: NOT AT ALL
1. FEELING NERVOUS, ANXIOUS, OR ON EDGE: MORE THAN HALF THE DAYS
6. BECOMING EASILY ANNOYED OR IRRITABLE: MORE THAN HALF THE DAYS
3. WORRYING TOO MUCH ABOUT DIFFERENT THINGS: MORE THAN HALF THE DAYS
IF YOU CHECKED OFF ANY PROBLEMS ON THIS QUESTIONNAIRE, HOW DIFFICULT HAVE THESE PROBLEMS MADE IT FOR YOU TO DO YOUR WORK, TAKE CARE OF THINGS AT HOME, OR GET ALONG WITH OTHER PEOPLE: VERY DIFFICULT
2. NOT BEING ABLE TO STOP OR CONTROL WORRYING: MORE THAN HALF THE DAYS
GAD7 TOTAL SCORE: 9

## 2019-02-12 ASSESSMENT — PATIENT HEALTH QUESTIONNAIRE - PHQ9
5. POOR APPETITE OR OVEREATING: SEVERAL DAYS
SUM OF ALL RESPONSES TO PHQ QUESTIONS 1-9: 12

## 2019-02-12 ASSESSMENT — PAIN SCALES - GENERAL: PAINLEVEL: NO PAIN (0)

## 2019-02-12 NOTE — PROGRESS NOTES
SUBJECTIVE:   Piper Lisa is a 53 year old female who presents to clinic today for the following health issues:      Patient reports of worsening of anxiety.  In the past she was on Lexapro which was working well.  She discontinued the medication as symptoms are well managed for a long time.  Since fall she has not had a period.  Wonders if she is menopausal.  Reports of hot flashes.    Lexapro caused sexual side effects and insomnia.  Patient tells that even after stopping Lexapro insomnia did not improve much.  She does not want to take any medicine for that.  Would like to try different medication.    Patient would like her hormones checked to see if she is truly menopausal.  Tells that 2 weeks ago tamoxifen was discontinued by her oncologist.  She needs to restart medication again but probably a different one if she is menopausal.    PHQ-9 SCORE 8/23/2017 8/28/2018 2/12/2019   PHQ-9 Total Score - - -   PHQ-9 Total Score 1 3 12     DAVID-7 SCORE 8/23/2017 8/28/2018 2/12/2019   Total Score - - -   Total Score 0 0 9           Problem list and histories reviewed & adjusted, as indicated.  Additional history: as documented    Patient Active Problem List   Diagnosis     Hirsutism     Generalized anxiety disorder     Migraine     Lentigo maligna (H)     Malignant neoplasm of upper-inner quadrant of right female breast (H)     History of melanoma in situ     Past Surgical History:   Procedure Laterality Date     ARTHROSCOPY KNEE RT/LT  2007    right ,degenerative changes joint and meniscus     AS EXC MALIG SKIN LESION TRUNK/ARM/LEG 0.6-1.0 CM      melanoma     BIOPSY NODE SENTINEL Right 9/14/2015    Procedure: BIOPSY NODE SENTINEL;  Surgeon: Eliezer Bates MD;  [facility]     COLONOSCOPY N/A 10/14/2016    Procedure: COLONOSCOPY;  Surgeon: Eliezer Bates MD;  Location:  GI     HC KNEE SCOPE, DIAGNOSTIC  1985    right, medial meniscus injury     LUMPECTOMY BREAST WITH SEED LOCALIZATION Right  "2015    Procedure: LUMPECTOMY BREAST WITH SEED LOCALIZATION;  Surgeon: Eliezer Bates MD;  Location: [facility]       Social History     Tobacco Use     Smoking status: Never Smoker     Smokeless tobacco: Never Used   Substance Use Topics     Alcohol use: Yes     Alcohol/week: 1.2 oz     Types: 2 Standard drinks or equivalent per week     Comment: occasional      Family History   Problem Relation Age of Onset     Cancer Father         ocular melanoma,  of metastatic diseas age 81     Hypertension Father      Lipids Father      Prostate Cancer Father         onset age 70     Gastrointestinal Disease Father         \"sensitive stomach\"     Skin Cancer Father      Gastrointestinal Disease Mother         cholecystectomy     Eye Disorder Mother         cataract, macular degeneration     Cerebrovascular Disease Mother 85     C.A.D. Maternal Grandmother          of MI age 83     C.A.D. Maternal Grandfather          MI early 60's     C.A.D. Paternal Grandfather          early 50s MI     Breast Cancer Paternal Grandmother          mid 70s     Hypertension Brother          Current Outpatient Medications   Medication Sig Dispense Refill     Calcium Citrate-Vitamin D (CALCIUM + D PO)        sertraline (ZOLOFT) 50 MG tablet Use half tab daily for 1-2 weeks and then increase to 1 tablet daily. 30 tablet 1     Allergies   Allergen Reactions     No Clinical Screening - See Comments Rash     metal       Reviewed and updated as needed this visit by clinical staff       Reviewed and updated as needed this visit by Provider         ROS:  CONSTITUTIONAL: NEGATIVE for fever, chills, change in weight  ENT/MOUTH: NEGATIVE for ear, mouth and throat problems  RESP: NEGATIVE for significant cough or SOB  CV: NEGATIVE for chest pain, palpitations or peripheral edema    OBJECTIVE:                                                    /78 (BP Location: Right arm, Patient Position: Sitting, Cuff Size: Adult Regular)  "  Pulse 84   Temp 99  F (37.2  C) (Tympanic)   Resp 14   Wt 68.9 kg (151 lb 12.8 oz)   LMP 10/15/2018 (Approximate)   SpO2 98%   BMI 26.07 kg/m    Body mass index is 26.07 kg/m .   GENERAL: healthy, alert, well nourished, well hydrated, no distress  HENT: ear canals- normal; TMs- normal; Nose- normal; Mouth- no ulcers, no lesions  NECK: no tenderness, no adenopathy, no asymmetry, no masses, no stiffness; thyroid- normal to palpation  RESP: lungs clear to auscultation - no rales, no rhonchi, no wheezes  CV: regular rates and rhythm, normal S1 S2, no S3 or S4 and no murmur, no click or rub -  ABDOMEN: soft, no tenderness, no  hepatosplenomegaly, no masses, normal bowel sounds  PSYCH: Alert and oriented times 3; speech- coherent , normal rate and volume; able to articulate logical thoughts, able to abstract reason, no tangential thoughts, no hallucinations or delusions, affect- normal         ASSESSMENT/PLAN:                                                      1. Generalized anxiety disorder  Patient is off of tamoxifen now.  She tells that the plan would be to start her on a different agent to prevent breast cancer other than tamoxifen.  Recommending to start Zoloft.  Side effect profile reviewed.  Patient is asked to follow-up in 1 month for recheck or sooner if any other concerns arise.  Patient verbalized understanding and agreement to the plan.  - sertraline (ZOLOFT) 50 MG tablet; Use half tab daily for 1-2 weeks and then increase to 1 tablet daily.  Dispense: 30 tablet; Refill: 1    2. Amenorrhea  Patient has not had menses since fall 2018.  Recommending some basic labs to assess that.  - Lutropin  - Follicle stimulating hormone  - Estradiol  - TSH with free T4 reflex      Total time spent was 25 minutes, more than half the time was spent in counseling the patient about the disease pathogenesis, treatment plan and coordinating care.      Maribel Serna MD  [facility]

## 2019-02-13 ENCOUNTER — TELEPHONE (OUTPATIENT)
Dept: ONCOLOGY | Facility: CLINIC | Age: 54
End: 2019-02-13

## 2019-02-13 LAB — TSH SERPL DL<=0.005 MIU/L-ACNC: 0.95 MU/L (ref 0.4–4)

## 2019-02-13 ASSESSMENT — ANXIETY QUESTIONNAIRES: GAD7 TOTAL SCORE: 9

## 2019-02-13 NOTE — TELEPHONE ENCOUNTER
Piper called clinic stating that she met with her primary physician Dr. Serna . Estorgen labs drawn which patient would like Dr. Kate to review. Piper stated that Dr. Serna prescribed Zoloft which our clinic [facility] stated was ok to take with Tamoxifen however patient stated when she went to fill it at [facility] pharmacy [facility] stated that it was not compatible with Tamoxifen. Writer will route message to Dr. Kate for her recommendation. She has not yet restarted her Tamoxifen secondary to mood changes.

## 2019-02-13 NOTE — TELEPHONE ENCOUNTER
Labs cannot confirm post-menopausal status and her last period was less than a year ago, so would not change to AI at this point.      She may want to make appointment to discuss options in detail in person.  It is difficult to do over phone call and messages.  We can go over her risk of recurrence again, and decide if she wants to continue tamoxifen.

## 2019-02-14 NOTE — TELEPHONE ENCOUNTER
Called Piper back, she will meet with Dr. Humble Nichols 2/19/19 at 0215 PM to discuss restarting Tamoxifen. Cleo Ferguson RN,BSN,OCN

## 2019-02-19 ENCOUNTER — ONCOLOGY VISIT (OUTPATIENT)
Dept: ONCOLOGY | Facility: CLINIC | Age: 54
End: 2019-02-19
Attending: INTERNAL MEDICINE
Payer: COMMERCIAL

## 2019-02-19 VITALS
BODY MASS INDEX: 25.61 KG/M2 | TEMPERATURE: 98.1 F | SYSTOLIC BLOOD PRESSURE: 106 MMHG | HEIGHT: 64 IN | HEART RATE: 89 BPM | OXYGEN SATURATION: 97 % | WEIGHT: 150 LBS | RESPIRATION RATE: 18 BRPM | DIASTOLIC BLOOD PRESSURE: 73 MMHG

## 2019-02-19 DIAGNOSIS — F41.1 GENERALIZED ANXIETY DISORDER: ICD-10-CM

## 2019-02-19 DIAGNOSIS — Z17.0 MALIGNANT NEOPLASM OF UPPER-INNER QUADRANT OF RIGHT BREAST IN FEMALE, ESTROGEN RECEPTOR POSITIVE (H): Primary | ICD-10-CM

## 2019-02-19 DIAGNOSIS — C50.211 MALIGNANT NEOPLASM OF UPPER-INNER QUADRANT OF RIGHT BREAST IN FEMALE, ESTROGEN RECEPTOR POSITIVE (H): Primary | ICD-10-CM

## 2019-02-19 PROCEDURE — G0463 HOSPITAL OUTPT CLINIC VISIT: HCPCS

## 2019-02-19 PROCEDURE — 99214 OFFICE O/P EST MOD 30 MIN: CPT | Performed by: INTERNAL MEDICINE

## 2019-02-19 RX ORDER — VENLAFAXINE HYDROCHLORIDE 37.5 MG/1
37.5 TABLET, EXTENDED RELEASE ORAL DAILY
Qty: 30 TABLET | Refills: 1 | Status: SHIPPED | OUTPATIENT
Start: 2019-02-19 | End: 2019-03-13

## 2019-02-19 ASSESSMENT — MIFFLIN-ST. JEOR: SCORE: 1270.08

## 2019-02-19 ASSESSMENT — PAIN SCALES - GENERAL: PAINLEVEL: NO PAIN (0)

## 2019-02-19 NOTE — LETTER
"    2/19/2019         RE: Piper Lisa  [address]  [facility] [id number]        Dear Colleague,    Thank you for referring your patient, Piper Lisa, to the [facility]. Please see a copy of my visit note below.    Oncology Rooming Note    February 19, 2019 2:08 PM   Piper Lisa is a 53 year old female who presents for:    Chief Complaint   Patient presents with     Oncology Clinic Visit     Malignant neoplasm of upper-inner quadrant of right female breast (H)     Initial Vitals: /73 (BP Location: Left arm, Patient Position: Sitting, Cuff Size: Adult Regular)   Pulse 89   Temp 98.1  F (36.7  C) (Oral)   Resp 18   Ht 1.625 m (5' 3.98\")   Wt 68 kg (150 lb)   SpO2 97%   BMI 25.76 kg/m    Estimated body mass index is 25.76 kg/m  as calculated from the following:    Height as of this encounter: 1.625 m (5' 3.98\").    Weight as of this encounter: 68 kg (150 lb). Body surface area is 1.75 meters squared.  No Pain (0) Comment: Data Unavailable   No LMP recorded.  Allergies reviewed: Yes  Medications reviewed: Yes    Medications: Medication refills not needed today.  Pharmacy name entered into [facility]:    [facility] - [address] & [facility]  [place] - [place], [place] - [address]    Clinical concerns: no   Marisel La CMA                [facility]    Hematology/Oncology Established Patient Note      Today's Date: 2/19/2019    Reason for Follow-up: Right invasive ductal carcinoma of breast s/p lumpectomy on 9/14/15, grade 1, 1.5 cm size tumor, negative margins, no LVI, negative lymph nodes, ER strongly positive, NV strongly positive, HER2 negative, pT1cN0, stage IA      HISTORY OF PRESENT ILLNESS: Piper Jackson is a 53 year old pre-menopausal female who presented with newly diagnosed right-sided breast cancer.  Piper underwent her " regular bilateral screening mammogram on 8/18/15, where a focal asymmetry in the right upper inner breast was found.  She was not having any symptoms at the time.  She denies feeling a breast lump/bump; no rash or nipple discharge.  An ultrasound was done, which found a 1.2 cm heterogenous focal lesion at the 1:00 position, 5 cm from the nipple.       She underwent lumpectomy on 9/14/15, pathology showed grade 1, 1.5 cm size tumor, negative margins, no LVI, negative lymph nodes, ER strongly positive, [place] strongly positive, HER2 negative, pT1cN0, stage IA.  Oncotype DX score is 12.  She completed radiation treatment 10/19/15-12/1/15.  She started tamoxifen on 12/5/15.      INTERIM HISTORY: Piper comes in for follow-up today.   She has been having hot flashes and mood changes, so she stopped her tamoxifen 3 weeks ago.  She was going to start Zoloft, but she was told her her pharmacist that she can't take it with tamoxifen, so she never started it.        REVIEW OF SYSTEMS:   14 point ROS was reviewed and is negative other than as noted above in HPI.       HOME MEDICATIONS:  Current Outpatient Medications   Medication Sig Dispense Refill     Calcium Citrate-Vitamin D (CALCIUM + D PO)        venlafaxine (EFFEXOR-ER) 37.5 MG 24 hr tablet Take 1 tablet (37.5 mg) by mouth daily 30 tablet 1         ALLERGIES:  Allergies   Allergen Reactions     No Clinical Screening - See Comments Rash     metal         PAST MEDICAL HISTORY:  Past Medical History:   Diagnosis Date     Generalized anxiety disorder 2002     Hirsutism     facial hair     Migraine          PAST SURGICAL HISTORY:  Past Surgical History:   Procedure Laterality Date     ARTHROSCOPY KNEE RT/LT  2007    right ,degenerative changes joint and meniscus     AS EXC MALIG SKIN LESION TRUNK/ARM/LEG 0.6-1.0 CM      melanoma     BIOPSY NODE SENTINEL Right 9/14/2015    Procedure: BIOPSY NODE SENTINEL;  Surgeon: Eliezer Bates MD;  Location: [facility]     COLONOSCOPY N/A  10/14/2016    Procedure: COLONOSCOPY;  Surgeon: Eliezer Bates MD;  Location:  GI     HC KNEE SCOPE, DIAGNOSTIC  1985    right, medial meniscus injury     LUMPECTOMY BREAST WITH SEED LOCALIZATION Right 9/14/2015    Procedure: LUMPECTOMY BREAST WITH SEED LOCALIZATION;  Surgeon: Eliezer Bates MD;  Location: [facility]         SOCIAL HISTORY:  Social History     Socioeconomic History     Marital status:      Spouse name: Jonah     Number of children: 0     Years of education: 18     Highest education level: Not on file   Social Needs     Financial resource strain: Not on file     Food insecurity - worry: Not on file     Food insecurity - inability: Not on file     Transportation needs - medical: Not on file     Transportation needs - non-medical: Not on file   Occupational History     Occupation:      Employer: [employer]     Comment: masters in social work   Tobacco Use     Smoking status: Never Smoker     Smokeless tobacco: Never Used   Substance and Sexual Activity     Alcohol use: Yes     Alcohol/week: 1.2 oz     Types: 2 Standard drinks or equivalent per week     Comment: occasional      Drug use: No     Sexual activity: Yes     Partners: Male     Comment: same relationship since 2000, 3 partners lifelong   Other Topics Concern      Service No     Blood Transfusions No     Caffeine Concern No     Comment: 3 a week     Occupational Exposure No     Hobby Hazards No     Sleep Concern No     Comment: 6 hrs     Stress Concern No     Comment: low     Weight Concern No     Special Diet No     Comment: calcium/ high milk intake 2-3 glasses per day     Back Care Yes     Comment: lower back aches     Exercise Yes     Comment: running/weights 50-60 min 5-6 days per week     Bike Helmet Yes     Seat Belt Yes     Self-Exams No     Parent/sibling w/ CABG, MI or angioplasty before 65F 55M? Not Asked   Social History Narrative    Lives with .  Desires no children. Has a  "dog.  She works as a .         FAMILY HISTORY:  Family History   Problem Relation Age of Onset     Cancer Father         ocular melanoma,  of metastatic diseas age 81     Hypertension Father      Lipids Father      Prostate Cancer Father         onset age 70     Gastrointestinal Disease Father         \"sensitive stomach\"     Skin Cancer Father      Gastrointestinal Disease Mother         cholecystectomy     Eye Disorder Mother         cataract, macular degeneration     Cerebrovascular Disease Mother 85     C.A.D. Maternal Grandmother          of MI age 83     C.A.D. Maternal Grandfather          MI early 60's     C.A.D. Paternal Grandfather          early 50s MI     Breast Cancer Paternal Grandmother          mid 70s     Hypertension Brother          PHYSICAL EXAM:  Vital signs:  /73 (BP Location: Left arm, Patient Position: Sitting, Cuff Size: Adult Regular)   Pulse 89   Temp 98.1  F (36.7  C) (Oral)   Resp 18   Ht 1.625 m (5' 3.98\")   Wt 68 kg (150 lb)   SpO2 97%   BMI 25.76 kg/m      ECO  GENERAL/CONSTITUTIONAL: No acute distress.  EYES: No scleral icterus.  NEUROLOGIC: Alert, oriented, answers questions appropriately.  INTEGUMENTARY: No jaundice.      LABS:  None today.      IMAGING:  Bilateral mammogram 10/05/18:  COMMENTS: No findings of suspicion for malignancy.          IMPRESSION: BI-RADS CATEGORY: 1 -  Negative      ASSESSMENT/PLAN:  Piper Jackson is a 53 year old pre-menopausal, otherwise healthy, female:    1) Invasive ductal carcinoma of right upper inner breast: s/p lumpectomy on 9/14/15, pathology showed grade 1, 1.5 cm size tumor, negative margins, no LVI, negative lymph nodes, ER strongly positive, [place] strongly positive, HER2 negative, pT1cN0, stage IA. Oncotype DX score is 12.  She completed radiation 10/19/15-12/1/15.  She started tamoxifen on 12/5/15.      There has been no evidence of recurrence on exam or mammogram.      Patient has been " having hot flashes and mood changes on tamoxifen, so she took a break from it 3 weeks ago.  She had also tapered off of Lexapro.  She says that she never tolerated Lexapro that well, and it caused her decreased sex drive.  Her PCP prescribed Zoloft, but she isn't sure if she should take it with tamoxifen.  She isn't sure if she wants to resume tamoxifen.  Her menstrual period did return after stopping tamoxifen, and labs could not confirm menopausal status.      We reviewed her Oncotype DX report again.  With tamoxifen, her risk of recurrence at 10 years, with tamoxifen is around 8%.  Without hormonal therapy, the risk is higher than that, which may be around 15%.  Piper does want to take tamoxifen.  She is willing to try Effexor with it, to try to decrease the hot flashes and the mood changes.      -Continue tamoxifen - plan to treat for 10 years, if can tolerate.   -will start Effexor ER 37.5 mg daily.  If tolerated well, increase up to 75 mg daily at the next visit  -RTC in ~4-6 weeks to see how she is doing.    -next bilateral screening mammogram in October 2019 - will order at the next visit    2) Melanoma in situ: s/p excision at left lateral lower leg  -continue follow-up with dermatology    3) Colon screening: She had screening colonoscopy 10/14/16.  It was normal.  Next colonoscopy was recommended for 10 year from then.    4) Generalized anxiety disorder: She has tapered off of Lexapro.  -she is going to start Effexor now, as above      I spent a total of 25 minutes with the patient, with over >50% of the time in counseling and/or coordination of care.      Fabiola Kate MD  Hematology/Oncology  [facility]        Again, thank you for allowing me to participate in the care of your patient.        Sincerely,        Fabiola Kate MD

## 2019-02-19 NOTE — PROGRESS NOTES
[facility]    Hematology/Oncology Established Patient Note      Today's Date: 2/19/2019    Reason for Follow-up: Right invasive ductal carcinoma of breast s/p lumpectomy on 9/14/15, grade 1, 1.5 cm size tumor, negative margins, no LVI, negative lymph nodes, ER strongly positive, NM strongly positive, HER2 negative, pT1cN0, stage IA      HISTORY OF PRESENT ILLNESS: Piper Jackson is a 53 year old pre-menopausal female who presented with newly diagnosed right-sided breast cancer.  Piper underwent her regular bilateral screening mammogram on 8/18/15, where a focal asymmetry in the right upper inner breast was found.  She was not having any symptoms at the time.  She denies feeling a breast lump/bump; no rash or nipple discharge.  An ultrasound was done, which found a 1.2 cm heterogenous focal lesion at the 1:00 position, 5 cm from the nipple.       She underwent lumpectomy on 9/14/15, pathology showed grade 1, 1.5 cm size tumor, negative margins, no LVI, negative lymph nodes, ER strongly positive, NM strongly positive, HER2 negative, pT1cN0, stage IA.  Oncotype DX score is 12.  She completed radiation treatment 10/19/15-12/1/15.  She started tamoxifen on 12/5/15.      INTERIM HISTORY: Piper comes in for follow-up today.   She has been having hot flashes and mood changes, so she stopped her tamoxifen 3 weeks ago.  She was going to start Zoloft, but she was told her her pharmacist that she can't take it with tamoxifen, so she never started it.        REVIEW OF SYSTEMS:   14 point ROS was reviewed and is negative other than as noted above in HPI.       HOME MEDICATIONS:  Current Outpatient Medications   Medication Sig Dispense Refill     Calcium Citrate-Vitamin D (CALCIUM + D PO)        venlafaxine (EFFEXOR-ER) 37.5 MG 24 hr tablet Take 1 tablet (37.5 mg) by mouth daily 30 tablet 1         ALLERGIES:  Allergies   Allergen Reactions     No Clinical Screening - See Comments Rash     metal         PAST  MEDICAL HISTORY:  Past Medical History:   Diagnosis Date     Generalized anxiety disorder 2002     Hirsutism     facial hair     Migraine          PAST SURGICAL HISTORY:  Past Surgical History:   Procedure Laterality Date     ARTHROSCOPY KNEE RT/LT  2007    right ,degenerative changes joint and meniscus     AS EXC MALIG SKIN LESION TRUNK/ARM/LEG 0.6-1.0 CM      melanoma     BIOPSY NODE SENTINEL Right 9/14/2015    Procedure: BIOPSY NODE SENTINEL;  Surgeon: Eliezer Bates MD;  Location: [facility]     COLONOSCOPY N/A 10/14/2016    Procedure: COLONOSCOPY;  Surgeon: Eliezer Bates MD;  Location:  GI     HC KNEE SCOPE, DIAGNOSTIC  1985    right, medial meniscus injury     LUMPECTOMY BREAST WITH SEED LOCALIZATION Right 9/14/2015    Procedure: LUMPECTOMY BREAST WITH SEED LOCALIZATION;  Surgeon: Eliezer Bates MD;  Location: [facility]         SOCIAL HISTORY:  Social History     Socioeconomic History     Marital status:      Spouse name: Jonah     Number of children: 0     Years of education: 18     Highest education level: Not on file   Social Needs     Financial resource strain: Not on file     Food insecurity - worry: Not on file     Food insecurity - inability: Not on file     Transportation needs - medical: Not on file     Transportation needs - non-medical: Not on file   Occupational History     Occupation:      Employer: [employer]     Comment: masters in social work   Tobacco Use     Smoking status: Never Smoker     Smokeless tobacco: Never Used   Substance and Sexual Activity     Alcohol use: Yes     Alcohol/week: 1.2 oz     Types: 2 Standard drinks or equivalent per week     Comment: occasional      Drug use: No     Sexual activity: Yes     Partners: Male     Comment: same relationship since 2000, 3 partners lifelong   Other Topics Concern      Service No     Blood Transfusions No     Caffeine Concern No     Comment: 3 a week     Occupational Exposure No      "Hobby Hazards No     Sleep Concern No     Comment: 6 hrs     Stress Concern No     Comment: low     Weight Concern No     Special Diet No     Comment: calcium/ high milk intake 2-3 glasses per day     Back Care Yes     Comment: lower back aches     Exercise Yes     Comment: running/weights 50-60 min 5-6 days per week     Bike Helmet Yes     Seat Belt Yes     Self-Exams No     Parent/sibling w/ CABG, MI or angioplasty before 65F 55M? Not Asked   Social History Narrative    Lives with .  Desires no children. Has a dog.  She works as a .         FAMILY HISTORY:  Family History   Problem Relation Age of Onset     Cancer Father         ocular melanoma,  of metastatic diseas age 81     Hypertension Father      Lipids Father      Prostate Cancer Father         onset age 70     Gastrointestinal Disease Father         \"sensitive stomach\"     Skin Cancer Father      Gastrointestinal Disease Mother         cholecystectomy     Eye Disorder Mother         cataract, macular degeneration     Cerebrovascular Disease Mother 85     C.A.D. Maternal Grandmother          of MI age 83     C.A.D. Maternal Grandfather          MI early 60's     C.A.D. Paternal Grandfather          early 50s MI     Breast Cancer Paternal Grandmother          mid 70s     Hypertension Brother          PHYSICAL EXAM:  Vital signs:  /73 (BP Location: Left arm, Patient Position: Sitting, Cuff Size: Adult Regular)   Pulse 89   Temp 98.1  F (36.7  C) (Oral)   Resp 18   Ht 1.625 m (5' 3.98\")   Wt 68 kg (150 lb)   SpO2 97%   BMI 25.76 kg/m     ECO  GENERAL/CONSTITUTIONAL: No acute distress.  EYES: No scleral icterus.  NEUROLOGIC: Alert, oriented, answers questions appropriately.  INTEGUMENTARY: No jaundice.      LABS:  None today.      IMAGING:  Bilateral mammogram 10/05/18:  COMMENTS: No findings of suspicion for malignancy.          IMPRESSION: BI-RADS CATEGORY: 1 -  Negative      ASSESSMENT/PLAN:  " Piper Jackson is a 53 year old pre-menopausal, otherwise healthy, female:    1) Invasive ductal carcinoma of right upper inner breast: s/p lumpectomy on 9/14/15, pathology showed grade 1, 1.5 cm size tumor, negative margins, no LVI, negative lymph nodes, ER strongly positive, [place] strongly positive, HER2 negative, pT1cN0, stage IA. Oncotype DX score is 12.  She completed radiation 10/19/15-12/1/15.  She started tamoxifen on 12/5/15.      There has been no evidence of recurrence on exam or mammogram.      Patient has been having hot flashes and mood changes on tamoxifen, so she took a break from it 3 weeks ago.  She had also tapered off of Lexapro.  She says that she never tolerated Lexapro that well, and it caused her decreased sex drive.  Her PCP prescribed Zoloft, but she isn't sure if she should take it with tamoxifen.  She isn't sure if she wants to resume tamoxifen.  Her menstrual period did return after stopping tamoxifen, and labs could not confirm menopausal status.      We reviewed her Oncotype DX report again.  With tamoxifen, her risk of recurrence at 10 years, with tamoxifen is around 8%.  Without hormonal therapy, the risk is higher than that, which may be around 15%.  Piper does want to take tamoxifen.  She is willing to try Effexor with it, to try to decrease the hot flashes and the mood changes.      -Continue tamoxifen - plan to treat for 10 years, if can tolerate.   -will start Effexor ER 37.5 mg daily.  If tolerated well, increase up to 75 mg daily at the next visit  -RTC in ~4-6 weeks to see how she is doing.    -next bilateral screening mammogram in October 2019 - will order at the next visit    2) Melanoma in situ: s/p excision at left lateral lower leg  -continue follow-up with dermatology    3) Colon screening: She had screening colonoscopy 10/14/16.  It was normal.  Next colonoscopy was recommended for 10 year from then.    4) Generalized anxiety disorder: She has tapered off of Lexapro.  -she  is going to start Effexor now, as above      I spent a total of 25 minutes with the patient, with over >50% of the time in counseling and/or coordination of care.      Fabiola Kate MD  Hematology/Oncology  [facility]

## 2019-02-19 NOTE — PROGRESS NOTES
"Oncology Rooming Note    February 19, 2019 2:08 PM   Piper Lisa is a 53 year old female who presents for:    Chief Complaint   Patient presents with     Oncology Clinic Visit     Malignant neoplasm of upper-inner quadrant of right female breast (H)     Initial Vitals: /73 (BP Location: Left arm, Patient Position: Sitting, Cuff Size: Adult Regular)   Pulse 89   Temp 98.1  F (36.7  C) (Oral)   Resp 18   Ht 1.625 m (5' 3.98\")   Wt 68 kg (150 lb)   SpO2 97%   BMI 25.76 kg/m   Estimated body mass index is 25.76 kg/m  as calculated from the following:    Height as of this encounter: 1.625 m (5' 3.98\").    Weight as of this encounter: 68 kg (150 lb). Body surface area is 1.75 meters squared.  No Pain (0) Comment: Data Unavailable   No LMP recorded.  Allergies reviewed: Yes  Medications reviewed: Yes    Medications: Medication refills not needed today.  Pharmacy name entered into [facility]:    [id number] - [address] & [facility]  [facility] - [place], [place] - [address]    Clinical concerns: no   Marisel La CMA              "

## 2019-02-20 ENCOUNTER — TELEPHONE (OUTPATIENT)
Dept: ONCOLOGY | Facility: CLINIC | Age: 54
End: 2019-02-20

## 2019-02-20 NOTE — TELEPHONE ENCOUNTER
Oncology Distress Screening Follow-up  Clinical Social Work  [facility]    Identified Concern and Score From Distress Screening:   3. How concerned are you about feeling depressed or very sad?   7 Abnormal         4. How concerned are you about feeling anxious or very scared?   7 Abnormal           Date of Distress Screenin19    Intervention:   Piper is a 53-year-old woman who comes to clinic for scheduled follow-up with Dr. Kate. Piper is previously known to this clinician, SW contacted Piper today with goal of following up regarding psychosocial distress. LVM with SW contact information and availability. Pt previously given SW contact information and knows to reach out in case of concern or need.     Follow-up Required:   Will await return call from patient. Will continue to be available as needed for ongoing psychosocial support.     ALBARO Grigsby, [facility]  Phone: [phone]  Pager: [phone]    [facility]: M, T  *every other Thursday, 8am-4:30pm  [facility]: W, F, *every other Thursday, 8am-4:30pm

## 2019-03-13 ENCOUNTER — MYC REFILL (OUTPATIENT)
Dept: ONCOLOGY | Facility: CLINIC | Age: 54
End: 2019-03-13

## 2019-03-13 DIAGNOSIS — C50.211 MALIGNANT NEOPLASM OF UPPER-INNER QUADRANT OF RIGHT BREAST IN FEMALE, ESTROGEN RECEPTOR POSITIVE (H): ICD-10-CM

## 2019-03-13 DIAGNOSIS — Z17.0 MALIGNANT NEOPLASM OF UPPER-INNER QUADRANT OF RIGHT BREAST IN FEMALE, ESTROGEN RECEPTOR POSITIVE (H): ICD-10-CM

## 2019-03-13 DIAGNOSIS — F41.1 GENERALIZED ANXIETY DISORDER: ICD-10-CM

## 2019-03-13 RX ORDER — VENLAFAXINE HYDROCHLORIDE 37.5 MG/1
37.5 TABLET, EXTENDED RELEASE ORAL DAILY
Qty: 30 TABLET | Refills: 1 | Status: SHIPPED | OUTPATIENT
Start: 2019-03-13 | End: 2019-03-29

## 2019-03-29 ENCOUNTER — ONCOLOGY VISIT (OUTPATIENT)
Dept: ONCOLOGY | Facility: CLINIC | Age: 54
End: 2019-03-29
Attending: INTERNAL MEDICINE
Payer: COMMERCIAL

## 2019-03-29 VITALS
SYSTOLIC BLOOD PRESSURE: 102 MMHG | BODY MASS INDEX: 25.27 KG/M2 | HEIGHT: 64 IN | TEMPERATURE: 98.2 F | OXYGEN SATURATION: 100 % | DIASTOLIC BLOOD PRESSURE: 69 MMHG | RESPIRATION RATE: 16 BRPM | WEIGHT: 148 LBS | HEART RATE: 85 BPM

## 2019-03-29 DIAGNOSIS — F41.1 GENERALIZED ANXIETY DISORDER: ICD-10-CM

## 2019-03-29 DIAGNOSIS — Z17.0 MALIGNANT NEOPLASM OF UPPER-INNER QUADRANT OF RIGHT BREAST IN FEMALE, ESTROGEN RECEPTOR POSITIVE (H): ICD-10-CM

## 2019-03-29 DIAGNOSIS — C50.211 MALIGNANT NEOPLASM OF UPPER-INNER QUADRANT OF RIGHT BREAST IN FEMALE, ESTROGEN RECEPTOR POSITIVE (H): ICD-10-CM

## 2019-03-29 PROCEDURE — G0463 HOSPITAL OUTPT CLINIC VISIT: HCPCS

## 2019-03-29 PROCEDURE — 99214 OFFICE O/P EST MOD 30 MIN: CPT | Performed by: INTERNAL MEDICINE

## 2019-03-29 RX ORDER — VENLAFAXINE HYDROCHLORIDE 75 MG/1
75 TABLET, EXTENDED RELEASE ORAL DAILY
Qty: 90 TABLET | Refills: 3 | Status: SHIPPED | OUTPATIENT
Start: 2019-03-29 | End: 2020-01-03

## 2019-03-29 ASSESSMENT — MIFFLIN-ST. JEOR: SCORE: 1261

## 2019-03-29 ASSESSMENT — PAIN SCALES - GENERAL: PAINLEVEL: NO PAIN (0)

## 2019-03-29 NOTE — PROGRESS NOTES
[facility]    Hematology/Oncology Established Patient Note      Today's Date: 3/29/2019    Reason for Follow-up: Right invasive ductal carcinoma of breast s/p lumpectomy on 9/14/15, grade 1, 1.5 cm size tumor, negative margins, no LVI, negative lymph nodes, ER strongly positive, MD strongly positive, HER2 negative, pT1cN0, stage IA      HISTORY OF PRESENT ILLNESS: Piper Jackson is a 53 year old pre-menopausal female who presented with newly diagnosed right-sided breast cancer.  Piper underwent her regular bilateral screening mammogram on 8/18/15, where a focal asymmetry in the right upper inner breast was found.  She was not having any symptoms at the time.  She denies feeling a breast lump/bump; no rash or nipple discharge.  An ultrasound was done, which found a 1.2 cm heterogenous focal lesion at the 1:00 position, 5 cm from the nipple.       She underwent lumpectomy on 9/14/15, pathology showed grade 1, 1.5 cm size tumor, negative margins, no LVI, negative lymph nodes, ER strongly positive, MD strongly positive, HER2 negative, pT1cN0, stage IA.  Oncotype DX score is 12.  She completed radiation treatment 10/19/15-12/1/15.  She started tamoxifen on 12/5/15.      INTERIM HISTORY: Piper comes in for follow-up today.   She has resumed the tamoxifen, and is now taking Effexor 37.5 mg daily.  She is doing better and tolerating the tamoxifen better now.  Her hot flashes are gone and her mood is better now.        REVIEW OF SYSTEMS:   14 point ROS was reviewed and is negative other than as noted above in HPI.       HOME MEDICATIONS:  Current Outpatient Medications   Medication Sig Dispense Refill     Calcium Citrate-Vitamin D (CALCIUM + D PO)        venlafaxine (EFFEXOR-ER) 37.5 MG 24 hr tablet Take 1 tablet (37.5 mg) by mouth daily 30 tablet 1         ALLERGIES:  Allergies   Allergen Reactions     No Clinical Screening - See Comments Rash     metal         PAST MEDICAL HISTORY:  Past Medical History:    Diagnosis Date     Generalized anxiety disorder 2002     Hirsutism     facial hair     Migraine          PAST SURGICAL HISTORY:  Past Surgical History:   Procedure Laterality Date     ARTHROSCOPY KNEE RT/LT  2007    right ,degenerative changes joint and meniscus     AS EXC MALIG SKIN LESION TRUNK/ARM/LEG 0.6-1.0 CM      melanoma     BIOPSY NODE SENTINEL Right 9/14/2015    Procedure: BIOPSY NODE SENTINEL;  Surgeon: Eliezer Bates MD;  Location: [facility]     COLONOSCOPY N/A 10/14/2016    Procedure: COLONOSCOPY;  Surgeon: Eliezer Bates MD;  Location:  GI     HC KNEE SCOPE, DIAGNOSTIC  1985    right, medial meniscus injury     LUMPECTOMY BREAST WITH SEED LOCALIZATION Right 9/14/2015    Procedure: LUMPECTOMY BREAST WITH SEED LOCALIZATION;  Surgeon: Eliezer Bates MD;  Location: [facility]         SOCIAL HISTORY:  Social History     Socioeconomic History     Marital status:      Spouse name: Jonah     Number of children: 0     Years of education: 18     Highest education level: Not on file   Occupational History     Occupation:      Employer: [employer]     Comment: masters in social work   Social Needs     Financial resource strain: Not on file     Food insecurity:     Worry: Not on file     Inability: Not on file     Transportation needs:     Medical: Not on file     Non-medical: Not on file   Tobacco Use     Smoking status: Never Smoker     Smokeless tobacco: Never Used   Substance and Sexual Activity     Alcohol use: Yes     Alcohol/week: 1.2 oz     Types: 2 Standard drinks or equivalent per week     Comment: occasional      Drug use: No     Sexual activity: Yes     Partners: Male     Comment: same relationship since 2000, 3 partners lifelong   Lifestyle     Physical activity:     Days per week: Not on file     Minutes per session: Not on file     Stress: Not on file   Relationships     Social connections:     Talks on phone: Not on file     Gets together: Not on file  "    Attends Nondenominational service: Not on file     Active member of club or organization: Not on file     Attends meetings of clubs or organizations: Not on file     Relationship status: Not on file     Intimate partner violence:     Fear of current or ex partner: Not on file     Emotionally abused: Not on file     Physically abused: Not on file     Forced sexual activity: Not on file   Other Topics Concern      Service No     Blood Transfusions No     Caffeine Concern No     Comment: 3 a week     Occupational Exposure No     Hobby Hazards No     Sleep Concern No     Comment: 6 hrs     Stress Concern No     Comment: low     Weight Concern No     Special Diet No     Comment: calcium/ high milk intake 2-3 glasses per day     Back Care Yes     Comment: lower back aches     Exercise Yes     Comment: running/weights 50-60 min 5-6 days per week     Bike Helmet Yes     Seat Belt Yes     Self-Exams No     Parent/sibling w/ CABG, MI or angioplasty before 65F 55M? Not Asked   Social History Narrative    Lives with .  Desires no children. Has a dog.  She works as a .         FAMILY HISTORY:  Family History   Problem Relation Age of Onset     Cancer Father         ocular melanoma,  of metastatic diseas age 81     Hypertension Father      Lipids Father      Prostate Cancer Father         onset age 70     Gastrointestinal Disease Father         \"sensitive stomach\"     Skin Cancer Father      Gastrointestinal Disease Mother         cholecystectomy     Eye Disorder Mother         cataract, macular degeneration     Cerebrovascular Disease Mother 85     C.A.D. Maternal Grandmother          of MI age 83     C.A.D. Maternal Grandfather          MI early 60's     C.A.D. Paternal Grandfather          early 50s MI     Breast Cancer Paternal Grandmother          mid 70s     Hypertension Brother          PHYSICAL EXAM:  Vital signs:  /69   Pulse 85   Temp 98.2  F (36.8  C) (Oral)   " "Resp 16   Ht 1.625 m (5' 3.98\")   Wt 67.1 kg (148 lb)   SpO2 100%   BMI 25.42 kg/m     ECO  GENERAL/CONSTITUTIONAL: No acute distress.  EYES: No scleral icterus.  NEUROLOGIC: Alert, oriented, answers questions appropriately.  INTEGUMENTARY: No jaundice.      LABS:  None today.      IMAGING:  Bilateral mammogram 10/05/18:  COMMENTS: No findings of suspicion for malignancy.          IMPRESSION: BI-RADS CATEGORY: 1 -  Negative      ASSESSMENT/PLAN:  Piper Jackson is a 53 year old pre-menopausal, otherwise healthy, female:    1) Invasive ductal carcinoma of right upper inner breast: s/p lumpectomy on 9/14/15, pathology showed grade 1, 1.5 cm size tumor, negative margins, no LVI, negative lymph nodes, ER strongly positive, DC strongly positive, HER2 negative, pT1cN0, stage IA. Oncotype DX score is 12.  She completed radiation 10/19/15-12/1/15.  She started tamoxifen on 12/5/15.      There has been no evidence of recurrence on exam or mammogram.      Patient has been having hot flashes and mood changes on tamoxifen, so she took a break from it.  She had also tapered off of Lexapro.  She says that she never tolerated Lexapro that well, and it caused her decreased sex drive.  Her menstrual period did return after stopping tamoxifen, and labs could not confirm menopausal status.      Piper does want to take tamoxifen.  She is willing to try Effexor with it, to try to decrease the hot flashes and the mood changes.  She has resumed it, and with the Effexor, she is doing better.  The hot flashes have not returned and her mood is better.  We will titrate up the Effexor to 75 mg daily.      -Continue tamoxifen - plan to treat for 10 years, if can tolerate.   -increase Effexor ER up to 75 mg daily  -RTC in 2019 as already scheduled for for routine follow-up  -next bilateral screening mammogram in 2019 - will order at the next visit    2) Melanoma in situ: s/p excision at left lateral lower leg  -continue " follow-up with dermatology    3) Colon screening: She had screening colonoscopy 10/14/16.  It was normal.  Next colonoscopy was recommended for 10 year from then.    4) Generalized anxiety disorder: She has tapered off of Lexapro.  -she is now on Effexor, as above, and titrate up dose.  She is doing better now.        I spent a total of 25 minutes with the patient, with over >50% of the time in counseling and/or coordination of care.      Fabiola Kate MD  Hematology/Oncology  [facility]

## 2019-03-29 NOTE — LETTER
"    3/29/2019         RE: Piper Lisa  [address]  [facility] [address]        Dear Colleague,    Thank you for referring your patient, Piper Lisa, to the [facility]. Please see a copy of my visit note below.    Oncology Rooming Note    March 29, 2019 9:33 AM   Piper Lisa is a 53 year old female who presents for:    Chief Complaint   Patient presents with     Oncology Clinic Visit     Initial Vitals: /69   Pulse 85   Temp 98.2  F (36.8  C) (Oral)   Resp 16   Ht 1.625 m (5' 3.98\")   Wt 67.1 kg (148 lb)   SpO2 100%   BMI 25.42 kg/m    Estimated body mass index is 25.42 kg/m  as calculated from the following:    Height as of this encounter: 1.625 m (5' 3.98\").    Weight as of this encounter: 67.1 kg (148 lb). Body surface area is 1.74 meters squared.  No Pain (0) Comment: Data Unavailable   No LMP recorded.  Allergies reviewed: Yes  Medications reviewed: Yes    Medications:   Pharmacy name entered into [employer]:    [id number] - [place], [place] - [address] [place] [address] AT [address] [address] & [facility]  [facility] - [place], [place] - [address]    Clinical concerns: discuss Effexor      Yamilet Stratton, [facility]    Hematology/Oncology Established Patient Note      Today's Date: 3/29/2019    Reason for Follow-up: Right invasive ductal carcinoma of breast s/p lumpectomy on 9/14/15, grade 1, 1.5 cm size tumor, negative margins, no LVI, negative lymph nodes, ER strongly positive, MO strongly positive, HER2 negative, pT1cN0, stage IA      HISTORY OF PRESENT ILLNESS: Piper Jackson is a 53 year old pre-menopausal female who presented with newly diagnosed right-sided breast cancer.  Piper underwent her regular bilateral screening mammogram on 8/18/15, where a focal asymmetry in the right upper inner breast was found.  She was not having any symptoms at the time.  She denies " feeling a breast lump/bump; no rash or nipple discharge.  An ultrasound was done, which found a 1.2 cm heterogenous focal lesion at the 1:00 position, 5 cm from the nipple.       She underwent lumpectomy on 9/14/15, pathology showed grade 1, 1.5 cm size tumor, negative margins, no LVI, negative lymph nodes, ER strongly positive, VT strongly positive, HER2 negative, pT1cN0, stage IA.  Oncotype DX score is 12.  She completed radiation treatment 10/19/15-12/1/15.  She started tamoxifen on 12/5/15.      INTERIM HISTORY: Piper comes in for follow-up today.   She has resumed the tamoxifen, and is now taking Effexor 37.5 mg daily.  She is doing better and tolerating the tamoxifen better now.  Her hot flashes are gone and her mood is better now.        REVIEW OF SYSTEMS:   14 point ROS was reviewed and is negative other than as noted above in HPI.       HOME MEDICATIONS:  Current Outpatient Medications   Medication Sig Dispense Refill     Calcium Citrate-Vitamin D (CALCIUM + D PO)        venlafaxine (EFFEXOR-ER) 37.5 MG 24 hr tablet Take 1 tablet (37.5 mg) by mouth daily 30 tablet 1         ALLERGIES:  Allergies   Allergen Reactions     No Clinical Screening - See Comments Rash     metal         PAST MEDICAL HISTORY:  Past Medical History:   Diagnosis Date     Generalized anxiety disorder 2002     Hirsutism     facial hair     Migraine          PAST SURGICAL HISTORY:  Past Surgical History:   Procedure Laterality Date     ARTHROSCOPY KNEE RT/LT  2007    right ,degenerative changes joint and meniscus     AS EXC MALIG SKIN LESION TRUNK/ARM/LEG 0.6-1.0 CM      melanoma     BIOPSY NODE SENTINEL Right 9/14/2015    Procedure: BIOPSY NODE SENTINEL;  Surgeon: Eliezer Bates MD;  Location: [facility]     COLONOSCOPY N/A 10/14/2016    Procedure: COLONOSCOPY;  Surgeon: Eliezer Bates MD;  Location:  GI     HC KNEE SCOPE, DIAGNOSTIC  1985    right, medial meniscus injury     LUMPECTOMY BREAST WITH SEED LOCALIZATION Right  9/14/2015    Procedure: LUMPECTOMY BREAST WITH SEED LOCALIZATION;  Surgeon: Eliezer Bates MD;  Location: [facility]         SOCIAL HISTORY:  Social History     Socioeconomic History     Marital status:      Spouse name: Jonah     Number of children: 0     Years of education: 18     Highest education level: Not on file   Occupational History     Occupation:      Employer: [employer]     Comment: masters in social work   Social Needs     Financial resource strain: Not on file     Food insecurity:     Worry: Not on file     Inability: Not on file     Transportation needs:     Medical: Not on file     Non-medical: Not on file   Tobacco Use     Smoking status: Never Smoker     Smokeless tobacco: Never Used   Substance and Sexual Activity     Alcohol use: Yes     Alcohol/week: 1.2 oz     Types: 2 Standard drinks or equivalent per week     Comment: occasional      Drug use: No     Sexual activity: Yes     Partners: Male     Comment: same relationship since 2000, 3 partners lifelong   Lifestyle     Physical activity:     Days per week: Not on file     Minutes per session: Not on file     Stress: Not on file   Relationships     Social connections:     Talks on phone: Not on file     Gets together: Not on file     Attends Latter day service: Not on file     Active member of club or organization: Not on file     Attends meetings of clubs or organizations: Not on file     Relationship status: Not on file     Intimate partner violence:     Fear of current or ex partner: Not on file     Emotionally abused: Not on file     Physically abused: Not on file     Forced sexual activity: Not on file   Other Topics Concern      Service No     Blood Transfusions No     Caffeine Concern No     Comment: 3 a week     Occupational Exposure No     Hobby Hazards No     Sleep Concern No     Comment: 6 hrs     Stress Concern No     Comment: low     Weight Concern No     Special Diet No     Comment: calcium/  "high milk intake 2-3 glasses per day     Back Care Yes     Comment: lower back aches     Exercise Yes     Comment: running/weights 50-60 min 5-6 days per week     Bike Helmet Yes     Seat Belt Yes     Self-Exams No     Parent/sibling w/ CABG, MI or angioplasty before 65F 55M? Not Asked   Social History Narrative    Lives with .  Desires no children. Has a dog.  She works as a .         FAMILY HISTORY:  Family History   Problem Relation Age of Onset     Cancer Father         ocular melanoma,  of metastatic diseas age 81     Hypertension Father      Lipids Father      Prostate Cancer Father         onset age 70     Gastrointestinal Disease Father         \"sensitive stomach\"     Skin Cancer Father      Gastrointestinal Disease Mother         cholecystectomy     Eye Disorder Mother         cataract, macular degeneration     Cerebrovascular Disease Mother 85     C.A.D. Maternal Grandmother          of MI age 83     C.A.D. Maternal Grandfather          MI early 60's     C.A.D. Paternal Grandfather          early 50s MI     Breast Cancer Paternal Grandmother          mid 70s     Hypertension Brother          PHYSICAL EXAM:  Vital signs:  /69   Pulse 85   Temp 98.2  F (36.8  C) (Oral)   Resp 16   Ht 1.625 m (5' 3.98\")   Wt 67.1 kg (148 lb)   SpO2 100%   BMI 25.42 kg/m      ECO  GENERAL/CONSTITUTIONAL: No acute distress.  EYES: No scleral icterus.  NEUROLOGIC: Alert, oriented, answers questions appropriately.  INTEGUMENTARY: No jaundice.      LABS:  None today.      IMAGING:  Bilateral mammogram 10/05/18:  COMMENTS: No findings of suspicion for malignancy.          IMPRESSION: BI-RADS CATEGORY: 1 -  Negative      ASSESSMENT/PLAN:  Piper Jackson is a 53 year old pre-menopausal, otherwise healthy, female:    1) Invasive ductal carcinoma of right upper inner breast: s/p lumpectomy on 9/14/15, pathology showed grade 1, 1.5 cm size tumor, negative margins, no LVI, " negative lymph nodes, ER strongly positive, TX strongly positive, HER2 negative, pT1cN0, stage IA. Oncotype DX score is 12.  She completed radiation 10/19/15-12/1/15.  She started tamoxifen on 12/5/15.      There has been no evidence of recurrence on exam or mammogram.      Patient has been having hot flashes and mood changes on tamoxifen, so she took a break from it.  She had also tapered off of Lexapro.  She says that she never tolerated Lexapro that well, and it caused her decreased sex drive.  Her menstrual period did return after stopping tamoxifen, and labs could not confirm menopausal status.      Piper does want to take tamoxifen.  She is willing to try Effexor with it, to try to decrease the hot flashes and the mood changes.  She has resumed it, and with the Effexor, she is doing better.  The hot flashes have not returned and her mood is better.  We will titrate up the Effexor to 75 mg daily.      -Continue tamoxifen - plan to treat for 10 years, if can tolerate.   -increase Effexor ER up to 75 mg daily  -RTC in July 2019 as already scheduled for for routine follow-up  -next bilateral screening mammogram in October 2019 - will order at the next visit    2) Melanoma in situ: s/p excision at left lateral lower leg  -continue follow-up with dermatology    3) Colon screening: She had screening colonoscopy 10/14/16.  It was normal.  Next colonoscopy was recommended for 10 year from then.    4) Generalized anxiety disorder: She has tapered off of Lexapro.  -she is now on Effexor, as above, and titrate up dose.  She is doing better now.        I spent a total of 25 minutes with the patient, with over >50% of the time in counseling and/or coordination of care.      Fabiola Kate MD  Hematology/Oncology  [facility]        Again, thank you for allowing me to participate in the care of your patient.        Sincerely,        Fabiola Kate MD

## 2019-03-29 NOTE — PROGRESS NOTES
"Oncology Rooming Note    March 29, 2019 9:33 AM   Piper Lisa is a 53 year old female who presents for:    Chief Complaint   Patient presents with     Oncology Clinic Visit     Initial Vitals: /69   Pulse 85   Temp 98.2  F (36.8  C) (Oral)   Resp 16   Ht 1.625 m (5' 3.98\")   Wt 67.1 kg (148 lb)   SpO2 100%   BMI 25.42 kg/m   Estimated body mass index is 25.42 kg/m  as calculated from the following:    Height as of this encounter: 1.625 m (5' 3.98\").    Weight as of this encounter: 67.1 kg (148 lb). Body surface area is 1.74 meters squared.  No Pain (0) Comment: Data Unavailable   No LMP recorded.  Allergies reviewed: Yes  Medications reviewed: Yes    Medications:   Pharmacy name entered into [facility]:    [id number] - [place], [place] - [address] [address] & [facility]  [facility] - [place], [place] - [address]    Clinical concerns: discuss Effexor      Yamilet Stratton, LYDIA            "

## 2019-07-10 ENCOUNTER — ALLIED HEALTH/NURSE VISIT (OUTPATIENT)
Dept: NURSING | Facility: CLINIC | Age: 54
End: 2019-07-10
Payer: COMMERCIAL

## 2019-07-10 DIAGNOSIS — Z23 NEED FOR SHINGLES VACCINE: Primary | ICD-10-CM

## 2019-07-10 PROCEDURE — 99207 ZZC NO CHARGE NURSE ONLY: CPT

## 2019-07-10 PROCEDURE — 90750 HZV VACC RECOMBINANT IM: CPT

## 2019-07-10 PROCEDURE — 90471 IMMUNIZATION ADMIN: CPT

## 2019-07-23 ENCOUNTER — ONCOLOGY VISIT (OUTPATIENT)
Dept: ONCOLOGY | Facility: CLINIC | Age: 54
End: 2019-07-23
Attending: INTERNAL MEDICINE
Payer: COMMERCIAL

## 2019-07-23 VITALS
DIASTOLIC BLOOD PRESSURE: 80 MMHG | SYSTOLIC BLOOD PRESSURE: 113 MMHG | TEMPERATURE: 98.2 F | HEIGHT: 64 IN | RESPIRATION RATE: 18 BRPM | OXYGEN SATURATION: 99 % | BODY MASS INDEX: 25.74 KG/M2 | WEIGHT: 150.8 LBS | HEART RATE: 73 BPM

## 2019-07-23 DIAGNOSIS — G47.09 OTHER INSOMNIA: ICD-10-CM

## 2019-07-23 DIAGNOSIS — C50.211 MALIGNANT NEOPLASM OF UPPER-INNER QUADRANT OF RIGHT BREAST IN FEMALE, ESTROGEN RECEPTOR POSITIVE (H): Primary | ICD-10-CM

## 2019-07-23 DIAGNOSIS — Z17.0 MALIGNANT NEOPLASM OF UPPER-INNER QUADRANT OF RIGHT BREAST IN FEMALE, ESTROGEN RECEPTOR POSITIVE (H): Primary | ICD-10-CM

## 2019-07-23 DIAGNOSIS — Z12.39 BREAST SCREENING: ICD-10-CM

## 2019-07-23 DIAGNOSIS — F41.9 ANXIETY: ICD-10-CM

## 2019-07-23 PROCEDURE — G0463 HOSPITAL OUTPT CLINIC VISIT: HCPCS

## 2019-07-23 PROCEDURE — 99214 OFFICE O/P EST MOD 30 MIN: CPT | Performed by: INTERNAL MEDICINE

## 2019-07-23 RX ORDER — TAMOXIFEN CITRATE 20 MG/1
TABLET ORAL DAILY
COMMUNITY
End: 2019-07-23

## 2019-07-23 RX ORDER — TAMOXIFEN CITRATE 10 MG/1
10 TABLET ORAL DAILY
Qty: 90 TABLET | Refills: 3 | Status: SHIPPED | OUTPATIENT
Start: 2019-07-23 | End: 2020-01-23

## 2019-07-23 RX ORDER — LORAZEPAM 1 MG/1
1 TABLET ORAL EVERY 8 HOURS PRN
Qty: 15 TABLET | Refills: 0 | Status: SHIPPED | OUTPATIENT
Start: 2019-07-23 | End: 2021-08-20

## 2019-07-23 ASSESSMENT — PAIN SCALES - GENERAL: PAINLEVEL: NO PAIN (0)

## 2019-07-23 ASSESSMENT — MIFFLIN-ST. JEOR: SCORE: 1268.7

## 2019-07-23 NOTE — PROGRESS NOTES
"Oncology Rooming Note    July 23, 2019 10:05 AM   Piper Lisa is a 54 year old female who presents for:    Chief Complaint   Patient presents with     Oncology Clinic Visit     History of melanoma in situ     Initial Vitals: /80 (BP Location: Right arm, Patient Position: Sitting, Cuff Size: Adult Regular)   Pulse 73   Temp 98.2  F (36.8  C) (Oral)   Resp 18   Ht 1.625 m (5' 3.98\")   Wt 68.4 kg (150 lb 12.8 oz)   SpO2 99%   BMI 25.90 kg/m   Estimated body mass index is 25.9 kg/m  as calculated from the following:    Height as of this encounter: 1.625 m (5' 3.98\").    Weight as of this encounter: 68.4 kg (150 lb 12.8 oz). Body surface area is 1.76 meters squared.  No Pain (0) Comment: Data Unavailable   No LMP recorded.  Allergies reviewed: Yes  Medications reviewed: Yes    Medications: Medication refills not needed today.  Pharmacy name entered into [facility]:    [id number] - [place], [place] - [address] [facility] AT [facility] [address] & [facility]  [facility] - [place], [place] - [address]    Clinical concerns: no          Marisel La MA              "

## 2019-07-23 NOTE — LETTER
7/23/2019         RE: Piper Lisa  [address]  [facility] [address]        Dear Colleague,    Thank you for referring your patient, Piper Lisa, to the [facility]. Please see a copy of my visit note below.    [facility]    Hematology/Oncology Established Patient Note      Today's Date: 7/23/2019    Reason for Follow-up: Right invasive ductal carcinoma of breast s/p lumpectomy on 9/14/15, grade 1, 1.5 cm size tumor, negative margins, no LVI, negative lymph nodes, ER strongly positive, GA strongly positive, HER2 negative, pT1cN0, stage IA      HISTORY OF PRESENT ILLNESS: Piper Jackson is a 54 year old pre-menopausal female who presented with newly diagnosed right-sided breast cancer.  Piper underwent her regular bilateral screening mammogram on 8/18/15, where a focal asymmetry in the right upper inner breast was found.  She was not having any symptoms at the time.  She denies feeling a breast lump/bump; no rash or nipple discharge.  An ultrasound was done, which found a 1.2 cm heterogenous focal lesion at the 1:00 position, 5 cm from the nipple.       She underwent lumpectomy on 9/14/15, pathology showed grade 1, 1.5 cm size tumor, negative margins, no LVI, negative lymph nodes, ER strongly positive, GA strongly positive, HER2 negative, pT1cN0, stage IA.  Oncotype DX score is 12.  She completed radiation treatment 10/19/15-12/1/15.  She started tamoxifen on 12/5/15.      INTERIM HISTORY: Piper comes in for follow-up today.   She says that she feels well overall.  She started having hot flashes again with the tamoxifen, despite increasing Effexor to 75 mg daily.  She is taking vacation with her sisters to [place] in August 2019.      REVIEW OF SYSTEMS:   14 point ROS was reviewed and is negative other than as noted above in HPI.       HOME MEDICATIONS:  Current Outpatient Medications   Medication Sig Dispense Refill     Calcium Citrate-Vitamin D (CALCIUM + D PO)         LORazepam (ATIVAN) 1 MG tablet Take 1 tablet (1 mg) by mouth every 8 hours as needed for anxiety or sleep 15 tablet 0     tamoxifen (NOLVADEX) 10 MG tablet Take 1 tablet (10 mg) by mouth daily 90 tablet 3     venlafaxine (EFFEXOR-ER) 75 MG 24 hr tablet Take 1 tablet (75 mg) by mouth daily 90 tablet 3         ALLERGIES:  Allergies   Allergen Reactions     No Clinical Screening - See Comments Rash     metal         PAST MEDICAL HISTORY:  Past Medical History:   Diagnosis Date     Generalized anxiety disorder 2002     Hirsutism     facial hair     Migraine          PAST SURGICAL HISTORY:  Past Surgical History:   Procedure Laterality Date     ARTHROSCOPY KNEE RT/LT  2007    right ,degenerative changes joint and meniscus     AS EXC MALIG SKIN LESION TRUNK/ARM/LEG 0.6-1.0 CM      melanoma     BIOPSY NODE SENTINEL Right 9/14/2015    Procedure: BIOPSY NODE SENTINEL;  Surgeon: Eliezer Bates MD;  Location: [facility]     COLONOSCOPY N/A 10/14/2016    Procedure: COLONOSCOPY;  Surgeon: Eliezer Bates MD;  Location:  GI      KNEE SCOPE, DIAGNOSTIC  1985    right, medial meniscus injury     LUMPECTOMY BREAST WITH SEED LOCALIZATION Right 9/14/2015    Procedure: LUMPECTOMY BREAST WITH SEED LOCALIZATION;  Surgeon: Eliezer Bates MD;  Location: [facility]         SOCIAL HISTORY:  Social History     Socioeconomic History     Marital status:      Spouse name: Jonah     Number of children: 0     Years of education: 18     Highest education level: Not on file   Occupational History     Occupation:      Employer: [employer]     Comment: masters in social work   Social Needs     Financial resource strain: Not on file     Food insecurity:     Worry: Not on file     Inability: Not on file     Transportation needs:     Medical: Not on file     Non-medical: Not on file   Tobacco Use     Smoking status: Never Smoker     Smokeless tobacco: Never Used   Substance and Sexual Activity     Alcohol  "use: Yes     Alcohol/week: 1.2 oz     Types: 2 Standard drinks or equivalent per week     Comment: occasional      Drug use: No     Sexual activity: Yes     Partners: Male     Comment: same relationship since , 3 partners lifelong   Lifestyle     Physical activity:     Days per week: Not on file     Minutes per session: Not on file     Stress: Not on file   Relationships     Social connections:     Talks on phone: Not on file     Gets together: Not on file     Attends Rastafari service: Not on file     Active member of club or organization: Not on file     Attends meetings of clubs or organizations: Not on file     Relationship status: Not on file     Intimate partner violence:     Fear of current or ex partner: Not on file     Emotionally abused: Not on file     Physically abused: Not on file     Forced sexual activity: Not on file   Other Topics Concern      Service No     Blood Transfusions No     Caffeine Concern No     Comment: 3 a week     Occupational Exposure No     Hobby Hazards No     Sleep Concern No     Comment: 6 hrs     Stress Concern No     Comment: low     Weight Concern No     Special Diet No     Comment: calcium/ high milk intake 2-3 glasses per day     Back Care Yes     Comment: lower back aches     Exercise Yes     Comment: running/weights 50-60 min 5-6 days per week     Bike Helmet Yes     Seat Belt Yes     Self-Exams No     Parent/sibling w/ CABG, MI or angioplasty before 65F 55M? Not Asked   Social History Narrative    Lives with .  Desires no children. Has a dog.  She works as a .         FAMILY HISTORY:  Family History   Problem Relation Age of Onset     Cancer Father         ocular melanoma,  of metastatic diseas age 81     Hypertension Father      Lipids Father      Prostate Cancer Father         onset age 70     Gastrointestinal Disease Father         \"sensitive stomach\"     Skin Cancer Father      Gastrointestinal Disease Mother         " "cholecystectomy     Eye Disorder Mother         cataract, macular degeneration     Cerebrovascular Disease Mother 85     C.A.D. Maternal Grandmother          of MI age 83     C.A.D. Maternal Grandfather          MI early 60's     C.A.D. Paternal Grandfather          early 50s MI     Breast Cancer Paternal Grandmother          mid 70s     Hypertension Brother          PHYSICAL EXAM:  Vital signs:  /80 (BP Location: Right arm, Patient Position: Sitting, Cuff Size: Adult Regular)   Pulse 73   Temp 98.2  F (36.8  C) (Oral)   Resp 18   Ht 1.625 m (5' 3.98\")   Wt 68.4 kg (150 lb 12.8 oz)   SpO2 99%   BMI 25.90 kg/m      ECO  GENERAL/CONSTITUTIONAL: No acute distress.  EYES: No scleral icterus.  RESPIRATORY: Clear to auscultation bilaterally. No crackles or wheezing.   CARDIOVASCULAR: Regular rate and rhythm without murmurs, gallops, or rubs.  GASTROINTESTINAL: No tenderness. The patient has normal bowel sounds. No guarding.  No distention.  BREAST: Right-s/p lumpectomy; no palpable mass, discharge, rash, or axillary lymphadenopathy.  Left-no palpable mass, discharge, rash, or axillary lymphadenopathy.   MUSCULOSKELETAL: Warm and well-perfused, no cyanosis, clubbing, or edema.  NEUROLOGIC: Alert, oriented, answers questions appropriately.  INTEGUMENTARY: No jaundice.  GAIT: Steady, does not use assistive device        LABS:  None today.      IMAGING:  Bilateral mammogram 10/05/18:  COMMENTS: No findings of suspicion for malignancy.          IMPRESSION: BI-RADS CATEGORY: 1 -  Negative      ASSESSMENT/PLAN:  Piper Jackson is a 54 year old pre-menopausal, otherwise healthy, female:    1) Invasive ductal carcinoma of right upper inner breast: s/p lumpectomy on 9/14/15, pathology showed grade 1, 1.5 cm size tumor, negative margins, no LVI, negative lymph nodes, ER strongly positive, [place] strongly positive, HER2 negative, pT1cN0, stage IA. Oncotype DX score is 12.  She completed radiation " 10/19/15-12/1/15.  She started tamoxifen on 12/5/15.      There has been no evidence of recurrence on exam or mammogram.      Patient has been having hot flashes and mood changes on tamoxifen, so she took a break from it.  She had also tapered off of Lexapro.  She says that she never tolerated Lexapro that well, and it caused her decreased sex drive.  Her menstrual period did return after stopping tamoxifen, and labs could not confirm menopausal status.      Piper does want to take tamoxifen.  She tried Effexor and is now up to 75 mg daily.  The hot flashes got better, but they came back again.  They continue to affect her quality of life.  We discussed decreasing down to 10 mg daily to see if her side effects improve, and she wants to try this.    -Continue tamoxifen - plan to treat for 10 years, if can tolerate.  Will decrease dose down to 10 mg daily.  New prescription written today.  -continue Effexor ER up to 75 mg daily  -next bilateral screening mammogram in October 2019 - ordered  -RTC in 6 months    2) Melanoma in situ: s/p excision at left lateral lower leg  -continue follow-up with dermatology    3) Colon screening: She had screening colonoscopy 10/14/16.  It was normal.  Next colonoscopy was recommended for 10 year from then.    4) Generalized anxiety disorder: She has tapered off of Lexapro.  -she is now on Effexor, as above.  She is doing better now.      5) She is going to [place] next month for vacation.  We discussed VTE prevention precautions, including walking around on the plane frequently, compression stockings, since she is on tamoxifen.  She expressed understanding.  She asked for prescription to help her sleep in plane and for anxiety.  Ativan prescription x 15 pills was given to her.      I spent a total of 25 minutes with the patient, with over >50% of the time in counseling and/or coordination of care.      Fabiola Kate MD  Hematology/Oncology  [facility]  "Physicians        Oncology Rooming Note    July 23, 2019 10:05 AM   Piper Lisa is a 54 year old female who presents for:    Chief Complaint   Patient presents with     Oncology Clinic Visit     History of melanoma in situ     Initial Vitals: /80 (BP Location: Right arm, Patient Position: Sitting, Cuff Size: Adult Regular)   Pulse 73   Temp 98.2  F (36.8  C) (Oral)   Resp 18   Ht 1.625 m (5' 3.98\")   Wt 68.4 kg (150 lb 12.8 oz)   SpO2 99%   BMI 25.90 kg/m    Estimated body mass index is 25.9 kg/m  as calculated from the following:    Height as of this encounter: 1.625 m (5' 3.98\").    Weight as of this encounter: 68.4 kg (150 lb 12.8 oz). Body surface area is 1.76 meters squared.  No Pain (0) Comment: Data Unavailable   No LMP recorded.  Allergies reviewed: Yes  Medications reviewed: Yes    Medications: Medication refills not needed today.  Pharmacy name entered into [facility]:    [id number] [place] [id number] [facility]  [facility] - [place], [place] - [facility] [address]    Clinical concerns: no          Marisel La MA                Again, thank you for allowing me to participate in the care of your patient.        Sincerely,        Faboila Kate MD    "

## 2019-07-23 NOTE — PROGRESS NOTES
[facility]    Hematology/Oncology Established Patient Note      Today's Date: 7/23/2019    Reason for Follow-up: Right invasive ductal carcinoma of breast s/p lumpectomy on 9/14/15, grade 1, 1.5 cm size tumor, negative margins, no LVI, negative lymph nodes, ER strongly positive, IA strongly positive, HER2 negative, pT1cN0, stage IA      HISTORY OF PRESENT ILLNESS: Piper Jackson is a 54 year old pre-menopausal female who presented with newly diagnosed right-sided breast cancer.  Piper underwent her regular bilateral screening mammogram on 8/18/15, where a focal asymmetry in the right upper inner breast was found.  She was not having any symptoms at the time.  She denies feeling a breast lump/bump; no rash or nipple discharge.  An ultrasound was done, which found a 1.2 cm heterogenous focal lesion at the 1:00 position, 5 cm from the nipple.       She underwent lumpectomy on 9/14/15, pathology showed grade 1, 1.5 cm size tumor, negative margins, no LVI, negative lymph nodes, ER strongly positive, IA strongly positive, HER2 negative, pT1cN0, stage IA.  Oncotype DX score is 12.  She completed radiation treatment 10/19/15-12/1/15.  She started tamoxifen on 12/5/15.      INTERIM HISTORY: Piper comes in for follow-up today.   She says that she feels well overall.  She started having hot flashes again with the tamoxifen, despite increasing Effexor to 75 mg daily.  She is taking vacation with her sisters to [place] in August 2019.      REVIEW OF SYSTEMS:   14 point ROS was reviewed and is negative other than as noted above in HPI.       HOME MEDICATIONS:  Current Outpatient Medications   Medication Sig Dispense Refill     Calcium Citrate-Vitamin D (CALCIUM + D PO)        LORazepam (ATIVAN) 1 MG tablet Take 1 tablet (1 mg) by mouth every 8 hours as needed for anxiety or sleep 15 tablet 0     tamoxifen (NOLVADEX) 10 MG tablet Take 1 tablet (10 mg) by mouth daily 90 tablet 3     venlafaxine (EFFEXOR-ER) 75 MG  24 hr tablet Take 1 tablet (75 mg) by mouth daily 90 tablet 3         ALLERGIES:  Allergies   Allergen Reactions     No Clinical Screening - See Comments Rash     metal         PAST MEDICAL HISTORY:  Past Medical History:   Diagnosis Date     Generalized anxiety disorder 2002     Hirsutism     facial hair     Migraine          PAST SURGICAL HISTORY:  Past Surgical History:   Procedure Laterality Date     ARTHROSCOPY KNEE RT/LT  2007    right ,degenerative changes joint and meniscus     AS EXC MALIG SKIN LESION TRUNK/ARM/LEG 0.6-1.0 CM      melanoma     BIOPSY NODE SENTINEL Right 9/14/2015    Procedure: BIOPSY NODE SENTINEL;  Surgeon: Eliezer Bates MD;  Location: [facility]     COLONOSCOPY N/A 10/14/2016    Procedure: COLONOSCOPY;  Surgeon: Eliezer Bates MD;  Location:  GI     HC KNEE SCOPE, DIAGNOSTIC  1985    right, medial meniscus injury     LUMPECTOMY BREAST WITH SEED LOCALIZATION Right 9/14/2015    Procedure: LUMPECTOMY BREAST WITH SEED LOCALIZATION;  Surgeon: Eliezer Bates MD;  Location: [facility]         SOCIAL HISTORY:  Social History     Socioeconomic History     Marital status:      Spouse name: Jonah     Number of children: 0     Years of education: 18     Highest education level: Not on file   Occupational History     Occupation:      Employer: [email or web address]     Comment: masters in social work   Social Needs     Financial resource strain: Not on file     Food insecurity:     Worry: Not on file     Inability: Not on file     Transportation needs:     Medical: Not on file     Non-medical: Not on file   Tobacco Use     Smoking status: Never Smoker     Smokeless tobacco: Never Used   Substance and Sexual Activity     Alcohol use: Yes     Alcohol/week: 1.2 oz     Types: 2 Standard drinks or equivalent per week     Comment: occasional      Drug use: No     Sexual activity: Yes     Partners: Male     Comment: same relationship since 2000, 3 partners lifelong  "  Lifestyle     Physical activity:     Days per week: Not on file     Minutes per session: Not on file     Stress: Not on file   Relationships     Social connections:     Talks on phone: Not on file     Gets together: Not on file     Attends Yarsani service: Not on file     Active member of club or organization: Not on file     Attends meetings of clubs or organizations: Not on file     Relationship status: Not on file     Intimate partner violence:     Fear of current or ex partner: Not on file     Emotionally abused: Not on file     Physically abused: Not on file     Forced sexual activity: Not on file   Other Topics Concern      Service No     Blood Transfusions No     Caffeine Concern No     Comment: 3 a week     Occupational Exposure No     Hobby Hazards No     Sleep Concern No     Comment: 6 hrs     Stress Concern No     Comment: low     Weight Concern No     Special Diet No     Comment: calcium/ high milk intake 2-3 glasses per day     Back Care Yes     Comment: lower back aches     Exercise Yes     Comment: running/weights 50-60 min 5-6 days per week     Bike Helmet Yes     Seat Belt Yes     Self-Exams No     Parent/sibling w/ CABG, MI or angioplasty before 65F 55M? Not Asked   Social History Narrative    Lives with .  Desires no children. Has a dog.  She works as a .         FAMILY HISTORY:  Family History   Problem Relation Age of Onset     Cancer Father         ocular melanoma,  of metastatic diseas age 81     Hypertension Father      Lipids Father      Prostate Cancer Father         onset age 70     Gastrointestinal Disease Father         \"sensitive stomach\"     Skin Cancer Father      Gastrointestinal Disease Mother         cholecystectomy     Eye Disorder Mother         cataract, macular degeneration     Cerebrovascular Disease Mother 85     C.A.D. Maternal Grandmother          of MI age 83     C.A.D. Maternal Grandfather          MI early 60's     " "GURWINDERACAMDEN. Paternal Grandfather          early 50s MI     Breast Cancer Paternal Grandmother          mid 70s     Hypertension Brother          PHYSICAL EXAM:  Vital signs:  /80 (BP Location: Right arm, Patient Position: Sitting, Cuff Size: Adult Regular)   Pulse 73   Temp 98.2  F (36.8  C) (Oral)   Resp 18   Ht 1.625 m (5' 3.98\")   Wt 68.4 kg (150 lb 12.8 oz)   SpO2 99%   BMI 25.90 kg/m     ECO  GENERAL/CONSTITUTIONAL: No acute distress.  EYES: No scleral icterus.  RESPIRATORY: Clear to auscultation bilaterally. No crackles or wheezing.   CARDIOVASCULAR: Regular rate and rhythm without murmurs, gallops, or rubs.  GASTROINTESTINAL: No tenderness. The patient has normal bowel sounds. No guarding.  No distention.  BREAST: Right-s/p lumpectomy; no palpable mass, discharge, rash, or axillary lymphadenopathy.  Left-no palpable mass, discharge, rash, or axillary lymphadenopathy.   MUSCULOSKELETAL: Warm and well-perfused, no cyanosis, clubbing, or edema.  NEUROLOGIC: Alert, oriented, answers questions appropriately.  INTEGUMENTARY: No jaundice.  GAIT: Steady, does not use assistive device        LABS:  None today.      IMAGING:  Bilateral mammogram 10/05/18:  COMMENTS: No findings of suspicion for malignancy.          IMPRESSION: BI-RADS CATEGORY: 1 -  Negative      ASSESSMENT/PLAN:  Piper Jackson is a 54 year old pre-menopausal, otherwise healthy, female:    1) Invasive ductal carcinoma of right upper inner breast: s/p lumpectomy on 9/14/15, pathology showed grade 1, 1.5 cm size tumor, negative margins, no LVI, negative lymph nodes, ER strongly positive, [place] strongly positive, HER2 negative, pT1cN0, stage IA. Oncotype DX score is 12.  She completed radiation 10/19/15-12/1/15.  She started tamoxifen on 12/5/15.      There has been no evidence of recurrence on exam or mammogram.      Patient has been having hot flashes and mood changes on tamoxifen, so she took a break from it.  She had also tapered off " of Lexapro.  She says that she never tolerated Lexapro that well, and it caused her decreased sex drive.  Her menstrual period did return after stopping tamoxifen, and labs could not confirm menopausal status.      Piper does want to take tamoxifen.  She tried Effexor and is now up to 75 mg daily.  The hot flashes got better, but they came back again.  They continue to affect her quality of life.  We discussed decreasing down to 10 mg daily to see if her side effects improve, and she wants to try this.    -Continue tamoxifen - plan to treat for 10 years, if can tolerate.  Will decrease dose down to 10 mg daily.  New prescription written today.  -continue Effexor ER up to 75 mg daily  -next bilateral screening mammogram in October 2019 - ordered  -RTC in 6 months    2) Melanoma in situ: s/p excision at left lateral lower leg  -continue follow-up with dermatology    3) Colon screening: She had screening colonoscopy 10/14/16.  It was normal.  Next colonoscopy was recommended for 10 year from then.    4) Generalized anxiety disorder: She has tapered off of Lexapro.  -she is now on Effexor, as above.  She is doing better now.      5) She is going to [place] next month for vacation.  We discussed VTE prevention precautions, including walking around on the plane frequently, compression stockings, since she is on tamoxifen.  She expressed understanding.  She asked for prescription to help her sleep in plane and for anxiety.  Ativan prescription x 15 pills was given to her.      I spent a total of 25 minutes with the patient, with over >50% of the time in counseling and/or coordination of care.      Fabiola Kate MD  Hematology/Oncology  [facility]

## 2019-07-26 ENCOUNTER — TELEPHONE (OUTPATIENT)
Dept: ONCOLOGY | Facility: CLINIC | Age: 54
End: 2019-07-26

## 2019-07-26 NOTE — TELEPHONE ENCOUNTER
Oncology Distress Screening Follow-up  Clinical Social Work  [facility]    Identified Concern and Score From Distress Screening:   3. How concerned are you about feeling depressed or very sad?   5           4. How concerned are you about feeling anxious or very scared?   6Abnormal            5. Do you struggle with the loss of meaning and sydnie in your life?       Somewhat             Date of Distress Screenin19    Intervention:   Piper is a 54-year-old woman who comes to clinic for scheduled follow-up with Dr. Kate. Piper is previously known to this clinician, SW contacted Piper today with goal of following up regarding psychosocial distress. LVM with SW contact information and availability. Pt previously given  contact information and knows to reach out in case of concern or need. Per chart review, Piper discussed emotional distress with Dr. Kate during visit and interventions were made to support mood at that time.      Follow-up Required:   Will await return call from patient. Will continue to be available as needed for ongoing psychosocial support.      ALBARO Grigsby, [facility] Center[facility]  Phone: [phone]  Pager: [phone]    [facility]: M, T  *every other Thursday, 8am-4:30pm  [facility]: W, F, *every other Thursday, 8am-4:30pm

## 2019-10-01 ENCOUNTER — HEALTH MAINTENANCE LETTER (OUTPATIENT)
Age: 54
End: 2019-10-01

## 2019-10-11 ENCOUNTER — HOSPITAL ENCOUNTER (OUTPATIENT)
Dept: MAMMOGRAPHY | Facility: CLINIC | Age: 54
Discharge: HOME | End: 2019-10-11
Attending: INTERNAL MEDICINE | Admitting: INTERNAL MEDICINE
Payer: COMMERCIAL

## 2019-10-11 DIAGNOSIS — Z12.39 BREAST SCREENING: ICD-10-CM

## 2019-10-11 PROCEDURE — 77063 BREAST TOMOSYNTHESIS BI: CPT

## 2020-01-03 DIAGNOSIS — Z17.0 MALIGNANT NEOPLASM OF UPPER-INNER QUADRANT OF RIGHT BREAST IN FEMALE, ESTROGEN RECEPTOR POSITIVE (H): ICD-10-CM

## 2020-01-03 DIAGNOSIS — F41.1 GENERALIZED ANXIETY DISORDER: ICD-10-CM

## 2020-01-03 DIAGNOSIS — C50.211 MALIGNANT NEOPLASM OF UPPER-INNER QUADRANT OF RIGHT BREAST IN FEMALE, ESTROGEN RECEPTOR POSITIVE (H): ICD-10-CM

## 2020-01-03 RX ORDER — VENLAFAXINE HYDROCHLORIDE 75 MG/1
75 TABLET, EXTENDED RELEASE ORAL DAILY
Qty: 90 TABLET | Refills: 3 | Status: SHIPPED | OUTPATIENT
Start: 2020-01-03 | End: 2021-04-02

## 2020-01-23 ENCOUNTER — TELEPHONE (OUTPATIENT)
Dept: ONCOLOGY | Facility: CLINIC | Age: 55
End: 2020-01-23

## 2020-01-23 DIAGNOSIS — C50.211 MALIGNANT NEOPLASM OF UPPER-INNER QUADRANT OF RIGHT BREAST IN FEMALE, ESTROGEN RECEPTOR POSITIVE (H): ICD-10-CM

## 2020-01-23 DIAGNOSIS — Z17.0 MALIGNANT NEOPLASM OF UPPER-INNER QUADRANT OF RIGHT BREAST IN FEMALE, ESTROGEN RECEPTOR POSITIVE (H): ICD-10-CM

## 2020-01-23 RX ORDER — TAMOXIFEN CITRATE 10 MG/1
10 TABLET ORAL DAILY
Qty: 90 TABLET | Refills: 3 | Status: SHIPPED | OUTPATIENT
Start: 2020-01-23 | End: 2020-01-27

## 2020-01-27 ENCOUNTER — PATIENT OUTREACH (OUTPATIENT)
Dept: ONCOLOGY | Facility: CLINIC | Age: 55
End: 2020-01-27

## 2020-01-27 DIAGNOSIS — C50.211 MALIGNANT NEOPLASM OF UPPER-INNER QUADRANT OF RIGHT BREAST IN FEMALE, ESTROGEN RECEPTOR POSITIVE (H): ICD-10-CM

## 2020-01-27 DIAGNOSIS — Z17.0 MALIGNANT NEOPLASM OF UPPER-INNER QUADRANT OF RIGHT BREAST IN FEMALE, ESTROGEN RECEPTOR POSITIVE (H): ICD-10-CM

## 2020-01-27 RX ORDER — TAMOXIFEN CITRATE 10 MG/1
10 TABLET ORAL DAILY
Qty: 90 TABLET | Refills: 3 | Status: SHIPPED | OUTPATIENT
Start: 2020-01-27 | End: 2021-08-20

## 2020-01-27 NOTE — PROGRESS NOTES
Piper called clinic stating that she needs a refill of her Tamoxifen. She would like it sent to [facility] in [place]. Cleo Ferguson RN,BSN,OCN

## 2020-01-28 ENCOUNTER — TELEPHONE (OUTPATIENT)
Dept: PHARMACY | Facility: CLINIC | Age: 55
End: 2020-01-28

## 2020-01-28 NOTE — TELEPHONE ENCOUNTER
Prior Authorization Approval    Authorization Effective Date: 1/28/2020  Authorization Expiration Date: 1/28/2030  Medication: Tamoxifen 10mg_APPROVED  Approved Dose/Quantity: 90 tablets for 90 days  Reference #: Your Tracking Number is [id number]   Insurance Company: [employer] - Phone [phone] Fax [phone]  Expected CoPay: ?     CoPay Card Available:    N/a   Foundation Assistance Needed: n/a   Which Pharmacy is filling the prescription (Not needed for infusion/clinic administered): [id number] [place] [address] & [facility]  Pharmacy Notified: Yes  Patient Notified: Yes

## 2020-01-28 NOTE — TELEPHONE ENCOUNTER
PA Initiation    Medication: Tamoxifen 10mg_PENDING  Insurance Company: [employer] - Phone [phone] Fax [phone]  Pharmacy Filling the Rx: [facility] #[id number] [place] [address] & [facility]  Filling Pharmacy Phone:  [phone]  Filling Pharmacy Fax:    Start Date: 1/28/2020

## 2020-02-21 ENCOUNTER — ONCOLOGY VISIT (OUTPATIENT)
Dept: ONCOLOGY | Facility: CLINIC | Age: 55
End: 2020-02-21
Attending: INTERNAL MEDICINE
Payer: COMMERCIAL

## 2020-02-21 VITALS
BODY MASS INDEX: 25.92 KG/M2 | DIASTOLIC BLOOD PRESSURE: 75 MMHG | HEIGHT: 64 IN | RESPIRATION RATE: 18 BRPM | TEMPERATURE: 98.9 F | SYSTOLIC BLOOD PRESSURE: 111 MMHG | WEIGHT: 151.8 LBS | HEART RATE: 89 BPM | OXYGEN SATURATION: 98 %

## 2020-02-21 DIAGNOSIS — C50.211 MALIGNANT NEOPLASM OF UPPER-INNER QUADRANT OF RIGHT BREAST IN FEMALE, ESTROGEN RECEPTOR POSITIVE (H): ICD-10-CM

## 2020-02-21 DIAGNOSIS — Z17.0 MALIGNANT NEOPLASM OF UPPER-INNER QUADRANT OF RIGHT BREAST IN FEMALE, ESTROGEN RECEPTOR POSITIVE (H): ICD-10-CM

## 2020-02-21 PROCEDURE — G0463 HOSPITAL OUTPT CLINIC VISIT: HCPCS

## 2020-02-21 PROCEDURE — 99214 OFFICE O/P EST MOD 30 MIN: CPT | Performed by: INTERNAL MEDICINE

## 2020-02-21 RX ORDER — TAMOXIFEN CITRATE 20 MG/1
20 TABLET ORAL DAILY
Qty: 90 TABLET | Refills: 3 | Status: SHIPPED | OUTPATIENT
Start: 2020-02-21 | End: 2021-08-20

## 2020-02-21 ASSESSMENT — MIFFLIN-ST. JEOR: SCORE: 1273.24

## 2020-02-21 ASSESSMENT — PAIN SCALES - GENERAL: PAINLEVEL: NO PAIN (0)

## 2020-02-21 NOTE — PROGRESS NOTES
[facility]    Hematology/Oncology Established Patient Note      Today's Date: 2/21/2020    Reason for Follow-up: Right invasive ductal carcinoma of breast s/p lumpectomy on 9/14/15, grade 1, 1.5 cm size tumor, negative margins, no LVI, negative lymph nodes, ER strongly positive, MA strongly positive, HER2 negative, pT1cN0, stage IA      HISTORY OF PRESENT ILLNESS: Piper Jackson is a 54 year old pre-menopausal female who presented with newly diagnosed right-sided breast cancer.  Piper underwent her regular bilateral screening mammogram on 8/18/15, where a focal asymmetry in the right upper inner breast was found.  She was not having any symptoms at the time.  She denies feeling a breast lump/bump; no rash or nipple discharge.  An ultrasound was done, which found a 1.2 cm heterogenous focal lesion at the 1:00 position, 5 cm from the nipple.       She underwent lumpectomy on 9/14/15, pathology showed grade 1, 1.5 cm size tumor, negative margins, no LVI, negative lymph nodes, ER strongly positive, MA strongly positive, HER2 negative, pT1cN0, stage IA.  Oncotype DX score is 12.  She completed radiation treatment 10/19/15-12/1/15.  She started tamoxifen on 12/5/15.      INTERIM HISTORY: Piper comes in for follow-up today.   She is doing well.  She decreased her tamoxifen down to 10 mg at the last visit, but she hasn't noticed a difference in her hot flash symptoms. She did start taking a supplement called Staying Cool, which has black cohash in it, which she feels has helped a lot.        REVIEW OF SYSTEMS:   14 point ROS was reviewed and is negative other than as noted above in HPI.       HOME MEDICATIONS:  Current Outpatient Medications   Medication Sig Dispense Refill     Calcium Citrate-Vitamin D (CALCIUM + D PO)        tamoxifen (NOLVADEX) 10 MG tablet Take 1 tablet (10 mg) by mouth daily 90 tablet 3     venlafaxine (EFFEXOR-ER) 75 MG 24 hr tablet Take 1 tablet (75 mg) by mouth daily 90 tablet 3      LORazepam (ATIVAN) 1 MG tablet Take 1 tablet (1 mg) by mouth every 8 hours as needed for anxiety or sleep (Patient not taking: Reported on 2/21/2020) 15 tablet 0         ALLERGIES:  Allergies   Allergen Reactions     No Clinical Screening - See Comments Rash     metal         PAST MEDICAL HISTORY:  Past Medical History:   Diagnosis Date     Generalized anxiety disorder 2002     Hirsutism     facial hair     Migraine          PAST SURGICAL HISTORY:  Past Surgical History:   Procedure Laterality Date     ARTHROSCOPY KNEE RT/LT  2007    right ,degenerative changes joint and meniscus     AS EXC MALIG SKIN LESION TRUNK/ARM/LEG 0.6-1.0 CM      melanoma     BIOPSY NODE SENTINEL Right 9/14/2015    Procedure: BIOPSY NODE SENTINEL;  Surgeon: Eliezer Bates MD;  Location: [facility]     COLONOSCOPY N/A 10/14/2016    Procedure: COLONOSCOPY;  Surgeon: Eliezer Bates MD;  Location:  GI     HC KNEE SCOPE, DIAGNOSTIC  1985    right, medial meniscus injury     LUMPECTOMY BREAST WITH SEED LOCALIZATION Right 9/14/2015    Procedure: LUMPECTOMY BREAST WITH SEED LOCALIZATION;  Surgeon: Eliezer Bates MD;  Location: [facility]         SOCIAL HISTORY:  Social History     Socioeconomic History     Marital status:      Spouse name: Jonah     Number of children: 0     Years of education: 18     Highest education level: Not on file   Occupational History     Occupation:      Employer: [employer]     Comment: masters in social work   Social Needs     Financial resource strain: Not on file     Food insecurity:     Worry: Not on file     Inability: Not on file     Transportation needs:     Medical: Not on file     Non-medical: Not on file   Tobacco Use     Smoking status: Never Smoker     Smokeless tobacco: Never Used   Substance and Sexual Activity     Alcohol use: Yes     Alcohol/week: 2.0 standard drinks     Types: 2 Standard drinks or equivalent per week     Comment: occasional      Drug use: No  "    Sexual activity: Yes     Partners: Male     Comment: same relationship since , 3 partners lifelong   Lifestyle     Physical activity:     Days per week: Not on file     Minutes per session: Not on file     Stress: Not on file   Relationships     Social connections:     Talks on phone: Not on file     Gets together: Not on file     Attends Church service: Not on file     Active member of club or organization: Not on file     Attends meetings of clubs or organizations: Not on file     Relationship status: Not on file     Intimate partner violence:     Fear of current or ex partner: Not on file     Emotionally abused: Not on file     Physically abused: Not on file     Forced sexual activity: Not on file   Other Topics Concern      Service No     Blood Transfusions No     Caffeine Concern No     Comment: 3 a week     Occupational Exposure No     Hobby Hazards No     Sleep Concern No     Comment: 6 hrs     Stress Concern No     Comment: low     Weight Concern No     Special Diet No     Comment: calcium/ high milk intake 2-3 glasses per day     Back Care Yes     Comment: lower back aches     Exercise Yes     Comment: running/weights 50-60 min 5-6 days per week     Bike Helmet Yes     Seat Belt Yes     Self-Exams No     Parent/sibling w/ CABG, MI or angioplasty before 65F 55M? Not Asked   Social History Narrative    Lives with .  Desires no children. Has a dog.  She works as a .         FAMILY HISTORY:  Family History   Problem Relation Age of Onset     Cancer Father         ocular melanoma,  of metastatic diseas age 81     Hypertension Father      Lipids Father      Prostate Cancer Father         onset age 70     Gastrointestinal Disease Father         \"sensitive stomach\"     Skin Cancer Father      Gastrointestinal Disease Mother         cholecystectomy     Eye Disorder Mother         cataract, macular degeneration     Cerebrovascular Disease Mother 85     C.A.D. Maternal " "Grandmother          of MI age 83     C.A.D. Maternal Grandfather          MI early 60's     C.A.D. Paternal Grandfather          early 50s MI     Breast Cancer Paternal Grandmother          mid 70s     Hypertension Brother          PHYSICAL EXAM:  Vital signs:  /75 (BP Location: Left arm, Patient Position: Sitting, Cuff Size: Adult Regular)   Pulse 89   Temp 98.9  F (37.2  C) (Oral)   Resp 18   Ht 1.625 m (5' 3.98\")   Wt 68.9 kg (151 lb 12.8 oz)   SpO2 98%   BMI 26.07 kg/m     ECO  GENERAL/CONSTITUTIONAL: No acute distress.  EYES: No scleral icterus.  RESPIRATORY: Clear to auscultation bilaterally. No crackles or wheezing.   CARDIOVASCULAR: Regular rate and rhythm without murmurs, gallops, or rubs.  GASTROINTESTINAL: No tenderness. The patient has normal bowel sounds. No guarding.  No distention.  BREAST: Right-s/p lumpectomy; no palpable mass, discharge, rash, or axillary lymphadenopathy.  Left-no palpable mass, discharge, rash, or axillary lymphadenopathy.   MUSCULOSKELETAL: Warm and well-perfused, no cyanosis, clubbing, or edema.  NEUROLOGIC: Alert, oriented, answers questions appropriately.  INTEGUMENTARY: No jaundice.  GAIT: Steady, does not use assistive device        LABS:  None today.      IMAGING:  Bilateral mammogram 10/11/19:  BI-RADS 2 - benign      ASSESSMENT/PLAN:  Piper Jackson is a 54 year old pre-menopausal, otherwise healthy, female:    1) Invasive ductal carcinoma of right upper inner breast: s/p lumpectomy on 9/14/15, pathology showed grade 1, 1.5 cm size tumor, negative margins, no LVI, negative lymph nodes, ER strongly positive, DE strongly positive, HER2 negative, pT1cN0, stage IA. Oncotype DX score is 12.  She completed radiation 10/19/15-12/1/15.  She started tamoxifen on 12/5/15.      There has been no evidence of recurrence on exam or mammogram.      Patient has been having hot flashes and mood changes on tamoxifen, so she took a break from it.  She had " also tapered off of Lexapro.  She says that she never tolerated Lexapro that well, and it caused her decreased sex drive.  Her menstrual period did return after stopping tamoxifen, and labs could not confirm menopausal status.      Piper does want to take tamoxifen.  She tried Effexor and is now up to 75 mg daily.  She tried decreasing tamoxifen down to 10 mg daily, but she doesn't feel that it made a difference in her hot flash symptoms.  She is okay with going back up to full dose of 20 mg daily.  She is taking a supplement for her hot flashes, which she feels is helpful.    Mammogram on 10/11/19 was benign.    -Continue tamoxifen - plan to treat for 10 years, if can tolerate.  She will increase back up to full dose of 20 mg daily - new prescription sent today.  -continue Effexor ER up to 75 mg daily  -next bilateral screening mammogram in October 2020 - will order at the next visit  -RTC in 6 months    2) Melanoma in situ: s/p excision at left lateral lower leg  -continue follow-up with dermatology    3) Colon screening: She had screening colonoscopy 10/14/16.  It was normal.  Next colonoscopy was recommended for 10 year from then.    4) Generalized anxiety disorder: She has tapered off of Lexapro.  -she is now on Effexor, as above.  She is doing better now.        I spent a total of 25 minutes with the patient, with over >50% of the time in counseling and/or coordination of care.      Fabiola Kate MD  Hematology/Oncology  [facility]

## 2020-02-21 NOTE — PROGRESS NOTES
"Oncology Rooming Note    February 21, 2020 1:00 PM   Piper Lisa is a 54 year old female who presents for:    Chief Complaint   Patient presents with     Oncology Clinic Visit     History of melanoma in situ     Initial Vitals: /75 (BP Location: Left arm, Patient Position: Sitting, Cuff Size: Adult Regular)   Pulse 89   Temp 98.9  F (37.2  C) (Oral)   Resp 18   Ht 1.625 m (5' 3.98\")   Wt 68.9 kg (151 lb 12.8 oz)   SpO2 98%   BMI 26.07 kg/m   Estimated body mass index is 26.07 kg/m  as calculated from the following:    Height as of this encounter: 1.625 m (5' 3.98\").    Weight as of this encounter: 68.9 kg (151 lb 12.8 oz). Body surface area is 1.76 meters squared.  No Pain (0) Comment: Data Unavailable   No LMP recorded.  Allergies reviewed: Yes  Medications reviewed: Yes    Medications: Medication refills not needed today.  Pharmacy name entered into [facility]:    [facility][id number] - [facility] [address] [address] & [facility]  [facility] - [place], [place] - [address]    Clinical concerns: no          Marisel La CMA              "

## 2020-02-21 NOTE — LETTER
"    2/21/2020         RE: Piper Lisa  [address]  [facility] [address]        Dear Colleague,    Thank you for referring your patient, Piper Lisa, to the [facility]. Please see a copy of my visit note below.    Oncology Rooming Note    February 21, 2020 1:00 PM   Piper Lisa is a 54 year old female who presents for:    Chief Complaint   Patient presents with     Oncology Clinic Visit     History of melanoma in situ     Initial Vitals: /75 (BP Location: Left arm, Patient Position: Sitting, Cuff Size: Adult Regular)   Pulse 89   Temp 98.9  F (37.2  C) (Oral)   Resp 18   Ht 1.625 m (5' 3.98\")   Wt 68.9 kg (151 lb 12.8 oz)   SpO2 98%   BMI 26.07 kg/m    Estimated body mass index is 26.07 kg/m  as calculated from the following:    Height as of this encounter: 1.625 m (5' 3.98\").    Weight as of this encounter: 68.9 kg (151 lb 12.8 oz). Body surface area is 1.76 meters squared.  No Pain (0) Comment: Data Unavailable   No LMP recorded.  Allergies reviewed: Yes  Medications reviewed: Yes    Medications: Medication refills not needed today.  Pharmacy name entered into [facility]:    [facility] #[id number] - [id number] [address] & [facility]  [facility] - [place], [place] - [address]    Clinical concerns: no          Marisel La CMA                [facility]    Hematology/Oncology Established Patient Note      Today's Date: 2/21/2020    Reason for Follow-up: Right invasive ductal carcinoma of breast s/p lumpectomy on 9/14/15, grade 1, 1.5 cm size tumor, negative margins, no LVI, negative lymph nodes, ER strongly positive, MT strongly positive, HER2 negative, pT1cN0, stage IA      HISTORY OF PRESENT ILLNESS: Piper Jackson is a 54 year old pre-menopausal female who presented with newly diagnosed right-sided breast cancer.  Piepr underwent her regular bilateral " screening mammogram on 8/18/15, where a focal asymmetry in the right upper inner breast was found.  She was not having any symptoms at the time.  She denies feeling a breast lump/bump; no rash or nipple discharge.  An ultrasound was done, which found a 1.2 cm heterogenous focal lesion at the 1:00 position, 5 cm from the nipple.       She underwent lumpectomy on 9/14/15, pathology showed grade 1, 1.5 cm size tumor, negative margins, no LVI, negative lymph nodes, ER strongly positive, NV strongly positive, HER2 negative, pT1cN0, stage IA.  Oncotype DX score is 12.  She completed radiation treatment 10/19/15-12/1/15.  She started tamoxifen on 12/5/15.      INTERIM HISTORY: Piper comes in for follow-up today.   She is doing well.  She decreased her tamoxifen down to 10 mg at the last visit, but she hasn't noticed a difference in her hot flash symptoms. She did start taking a supplement called Staying Cool, which has black cohash in it, which she feels has helped a lot.        REVIEW OF SYSTEMS:   14 point ROS was reviewed and is negative other than as noted above in HPI.       HOME MEDICATIONS:  Current Outpatient Medications   Medication Sig Dispense Refill     Calcium Citrate-Vitamin D (CALCIUM + D PO)        tamoxifen (NOLVADEX) 10 MG tablet Take 1 tablet (10 mg) by mouth daily 90 tablet 3     venlafaxine (EFFEXOR-ER) 75 MG 24 hr tablet Take 1 tablet (75 mg) by mouth daily 90 tablet 3     LORazepam (ATIVAN) 1 MG tablet Take 1 tablet (1 mg) by mouth every 8 hours as needed for anxiety or sleep (Patient not taking: Reported on 2/21/2020) 15 tablet 0         ALLERGIES:  Allergies   Allergen Reactions     No Clinical Screening - See Comments Rash     metal         PAST MEDICAL HISTORY:  Past Medical History:   Diagnosis Date     Generalized anxiety disorder 2002     Hirsutism     facial hair     Migraine          PAST SURGICAL HISTORY:  Past Surgical History:   Procedure Laterality Date     ARTHROSCOPY KNEE RT/LT  2007     right ,degenerative changes joint and meniscus     AS EXC MALIG SKIN LESION TRUNK/ARM/LEG 0.6-1.0 CM      melanoma     BIOPSY NODE SENTINEL Right 9/14/2015    Procedure: BIOPSY NODE SENTINEL;  Surgeon: Eliezer Bates MD;  Location: [facility]     COLONOSCOPY N/A 10/14/2016    Procedure: COLONOSCOPY;  Surgeon: Eliezer Bates MD;  Location:  GI     HC KNEE SCOPE, DIAGNOSTIC  1985    right, medial meniscus injury     LUMPECTOMY BREAST WITH SEED LOCALIZATION Right 9/14/2015    Procedure: LUMPECTOMY BREAST WITH SEED LOCALIZATION;  Surgeon: Eliezer Bates MD;  Location: [facility]         SOCIAL HISTORY:  Social History     Socioeconomic History     Marital status:      Spouse name: Jonah     Number of children: 0     Years of education: 18     Highest education level: Not on file   Occupational History     Occupation:      Employer: [employer]     Comment: masters in social work   Social Needs     Financial resource strain: Not on file     Food insecurity:     Worry: Not on file     Inability: Not on file     Transportation needs:     Medical: Not on file     Non-medical: Not on file   Tobacco Use     Smoking status: Never Smoker     Smokeless tobacco: Never Used   Substance and Sexual Activity     Alcohol use: Yes     Alcohol/week: 2.0 standard drinks     Types: 2 Standard drinks or equivalent per week     Comment: occasional      Drug use: No     Sexual activity: Yes     Partners: Male     Comment: same relationship since 2000, 3 partners lifelong   Lifestyle     Physical activity:     Days per week: Not on file     Minutes per session: Not on file     Stress: Not on file   Relationships     Social connections:     Talks on phone: Not on file     Gets together: Not on file     Attends [employer] service: Not on file     Active member of club or organization: Not on file     Attends meetings of clubs or organizations: Not on file     Relationship status: Not on file      "Intimate partner violence:     Fear of current or ex partner: Not on file     Emotionally abused: Not on file     Physically abused: Not on file     Forced sexual activity: Not on file   Other Topics Concern      Service No     Blood Transfusions No     Caffeine Concern No     Comment: 3 a week     Occupational Exposure No     Hobby Hazards No     Sleep Concern No     Comment: 6 hrs     Stress Concern No     Comment: low     Weight Concern No     Special Diet No     Comment: calcium/ high milk intake 2-3 glasses per day     Back Care Yes     Comment: lower back aches     Exercise Yes     Comment: running/weights 50-60 min 5-6 days per week     Bike Helmet Yes     Seat Belt Yes     Self-Exams No     Parent/sibling w/ CABG, MI or angioplasty before 65F 55M? Not Asked   Social History Narrative    Lives with .  Desires no children. Has a dog.  She works as a .         FAMILY HISTORY:  Family History   Problem Relation Age of Onset     Cancer Father         ocular melanoma,  of metastatic diseas age 81     Hypertension Father      Lipids Father      Prostate Cancer Father         onset age 70     Gastrointestinal Disease Father         \"sensitive stomach\"     Skin Cancer Father      Gastrointestinal Disease Mother         cholecystectomy     Eye Disorder Mother         cataract, macular degeneration     Cerebrovascular Disease Mother 85     C.A.D. Maternal Grandmother          of MI age 83     C.A.D. Maternal Grandfather          MI early 60's     C.A.D. Paternal Grandfather          early 50s MI     Breast Cancer Paternal Grandmother          mid 70s     Hypertension Brother          PHYSICAL EXAM:  Vital signs:  /75 (BP Location: Left arm, Patient Position: Sitting, Cuff Size: Adult Regular)   Pulse 89   Temp 98.9  F (37.2  C) (Oral)   Resp 18   Ht 1.625 m (5' 3.98\")   Wt 68.9 kg (151 lb 12.8 oz)   SpO2 98%   BMI 26.07 kg/m      ECOG: " 0  GENERAL/CONSTITUTIONAL: No acute distress.  EYES: No scleral icterus.  RESPIRATORY: Clear to auscultation bilaterally. No crackles or wheezing.   CARDIOVASCULAR: Regular rate and rhythm without murmurs, gallops, or rubs.  GASTROINTESTINAL: No tenderness. The patient has normal bowel sounds. No guarding.  No distention.  BREAST: Right-s/p lumpectomy; no palpable mass, discharge, rash, or axillary lymphadenopathy.  Left-no palpable mass, discharge, rash, or axillary lymphadenopathy.   MUSCULOSKELETAL: Warm and well-perfused, no cyanosis, clubbing, or edema.  NEUROLOGIC: Alert, oriented, answers questions appropriately.  INTEGUMENTARY: No jaundice.  GAIT: Steady, does not use assistive device        LABS:  None today.      IMAGING:  Bilateral mammogram 10/11/19:  BI-RADS 2 - benign      ASSESSMENT/PLAN:  Piper Jackson is a 54 year old pre-menopausal, otherwise healthy, female:    1) Invasive ductal carcinoma of right upper inner breast: s/p lumpectomy on 9/14/15, pathology showed grade 1, 1.5 cm size tumor, negative margins, no LVI, negative lymph nodes, ER strongly positive, TX strongly positive, HER2 negative, pT1cN0, stage IA. Oncotype DX score is 12.  She completed radiation 10/19/15-12/1/15.  She started tamoxifen on 12/5/15.      There has been no evidence of recurrence on exam or mammogram.      Patient has been having hot flashes and mood changes on tamoxifen, so she took a break from it.  She had also tapered off of Lexapro.  She says that she never tolerated Lexapro that well, and it caused her decreased sex drive.  Her menstrual period did return after stopping tamoxifen, and labs could not confirm menopausal status.      Piper does want to take tamoxifen.  She tried Effexor and is now up to 75 mg daily.  She tried decreasing tamoxifen down to 10 mg daily, but she doesn't feel that it made a difference in her hot flash symptoms.  She is okay with going back up to full dose of 20 mg daily.  She is taking a  supplement for her hot flashes, which she feels is helpful.    Mammogram on 10/11/19 was benign.    -Continue tamoxifen - plan to treat for 10 years, if can tolerate.  She will increase back up to full dose of 20 mg daily - new prescription sent today.  -continue Effexor ER up to 75 mg daily  -next bilateral screening mammogram in October 2020 - will order at the next visit  -RTC in 6 months    2) Melanoma in situ: s/p excision at left lateral lower leg  -continue follow-up with dermatology    3) Colon screening: She had screening colonoscopy 10/14/16.  It was normal.  Next colonoscopy was recommended for 10 year from then.    4) Generalized anxiety disorder: She has tapered off of Lexapro.  -she is now on Effexor, as above.  She is doing better now.        I spent a total of 25 minutes with the patient, with over >50% of the time in counseling and/or coordination of care.      Fabiola Kate MD  Hematology/Oncology  [facility]        Again, thank you for allowing me to participate in the care of your patient.        Sincerely,        Fabiola Kate MD

## 2020-02-24 ENCOUNTER — TELEPHONE (OUTPATIENT)
Dept: PHARMACY | Facility: CLINIC | Age: 55
End: 2020-02-24

## 2020-02-24 NOTE — TELEPHONE ENCOUNTER
Prior Authorization Approval    Authorization Effective Date: 1/28/2020  Authorization Expiration Date: 1/28/2030  Medication: tamoxifen 20mg_APPROVED till 01/2030  Approved Dose/Quantity: 30 for 30  Reference #:     Insurance Company: [employer] - Phone [phone] Fax [phone]  Expected CoPay:       CoPay Card Available: No    Foundation Assistance Needed: n/a  Which Pharmacy is filling the prescription (Not needed for infusion/clinic administered): [facility] #[id number] - [address] [address] & [facility]  Pharmacy Notified: Yes  Patient Notified: Yes

## 2020-08-26 ENCOUNTER — VIRTUAL VISIT (OUTPATIENT)
Dept: ONCOLOGY | Facility: CLINIC | Age: 55
End: 2020-08-26
Attending: INTERNAL MEDICINE
Payer: COMMERCIAL

## 2020-08-26 DIAGNOSIS — Z12.39 BREAST SCREENING: Primary | ICD-10-CM

## 2020-08-26 PROCEDURE — 99213 OFFICE O/P EST LOW 20 MIN: CPT | Mod: 95 | Performed by: INTERNAL MEDICINE

## 2020-08-26 PROCEDURE — 40001009 ZZH VIDEO/TELEPHONE VISIT; NO CHARGE

## 2020-08-26 ASSESSMENT — PAIN SCALES - GENERAL: PAINLEVEL: NO PAIN (0)

## 2020-08-26 NOTE — LETTER
"    8/26/2020         RE: Piper Lisa  [address]  [facility] [place] [address]        Dear Colleague,    Thank you for referring your patient, Piper Lisa, to the [facility]. Please see a copy of my visit note below.    Piper Lisa is a 55 year old female who is being evaluated via a billable video visit.      The patient has been notified of following:     \"This video visit will be conducted via a call between you and your physician/provider. We have found that certain health care needs can be provided without the need for an in-person physical exam.  This service lets us provide the care you need with a video conversation.  If a prescription is necessary we can send it directly to your pharmacy.  If lab work is needed we can place an order for that and you can then stop by our lab to have the test done at a later time.    Video visits are billed at different rates depending on your insurance coverage.  Please reach out to your insurance provider with any questions.    If during the course of the call the physician/provider feels a video visit is not appropriate, you will not be charged for this service.\"    Patient has given verbal consent for Video visit? Yes  How would you like to obtain your AVS? MyChart  If you are dropped from the video visit, the video invite should be resent to: Text to cell phone: [phone]  Will anyone else be joining your video visit? No    Video-Visit Details    Type of service:  Video Visit    Video Start Time: 10:12 am  Video End Time: 10:18 am    Originating Location (pt. Location): Home    Distant Location (provider location):  [facility]     Platform used for Video Visit: Jasmeet Duff MA      [facility]    Hematology/Oncology Established Patient Note      Today's Date: 8/26/2020    Reason for Follow-up: Right invasive ductal carcinoma of breast s/p lumpectomy on 9/14/15, grade 1, 1.5 cm size tumor, " negative margins, no LVI, negative lymph nodes, ER strongly positive, WV strongly positive, HER2 negative, pT1cN0, stage IA      HISTORY OF PRESENT ILLNESS: Piper Jackson is a 55 year old pre-menopausal female who presented with newly diagnosed right-sided breast cancer.  Piper underwent her regular bilateral screening mammogram on 8/18/15, where a focal asymmetry in the right upper inner breast was found.  She was not having any symptoms at the time.  She denies feeling a breast lump/bump; no rash or nipple discharge.  An ultrasound was done, which found a 1.2 cm heterogenous focal lesion at the 1:00 position, 5 cm from the nipple.       She underwent lumpectomy on 9/14/15, pathology showed grade 1, 1.5 cm size tumor, negative margins, no LVI, negative lymph nodes, ER strongly positive, WV strongly positive, HER2 negative, pT1cN0, stage IA.  Oncotype DX score is 12.  She completed radiation treatment 10/19/15-12/1/15.  She started tamoxifen on 12/5/15.      INTERIM HISTORY: Piper says that she is doing fine.  She continues to take tamoxifen 20 mg daily and tolerating fine.  She still gets hot flashes, but feels that it is tolerable.  She is happy with Effexor as well.      REVIEW OF SYSTEMS:   14 point ROS was reviewed and is negative other than as noted above in HPI.       HOME MEDICATIONS:  Current Outpatient Medications   Medication Sig Dispense Refill     Calcium Citrate-Vitamin D (CALCIUM + D PO)        tamoxifen (NOLVADEX) 10 MG tablet Take 1 tablet (10 mg) by mouth daily 90 tablet 3     tamoxifen 20 MG PO tablet Take 1 tablet (20 mg) by mouth daily 90 tablet 3     venlafaxine (EFFEXOR-ER) 75 MG 24 hr tablet Take 1 tablet (75 mg) by mouth daily 90 tablet 3     LORazepam (ATIVAN) 1 MG tablet Take 1 tablet (1 mg) by mouth every 8 hours as needed for anxiety or sleep (Patient not taking: Reported on 2/21/2020) 15 tablet 0         ALLERGIES:  Allergies   Allergen Reactions     No Clinical Screening - See Comments  Rash     metal         PAST MEDICAL HISTORY:  Past Medical History:   Diagnosis Date     Generalized anxiety disorder 2002     Hirsutism     facial hair     Migraine          PAST SURGICAL HISTORY:  Past Surgical History:   Procedure Laterality Date     ARTHROSCOPY KNEE RT/LT  2007    right ,degenerative changes joint and meniscus     AS EXC MALIG SKIN LESION TRUNK/ARM/LEG 0.6-1.0 CM      melanoma     BIOPSY NODE SENTINEL Right 9/14/2015    Procedure: BIOPSY NODE SENTINEL;  Surgeon: Eliezer Bates MD;  Location: [facility]     COLONOSCOPY N/A 10/14/2016    Procedure: COLONOSCOPY;  Surgeon: Eliezer aBtes MD;  Location:  GI     HC KNEE SCOPE, DIAGNOSTIC  1985    right, medial meniscus injury     LUMPECTOMY BREAST WITH SEED LOCALIZATION Right 9/14/2015    Procedure: LUMPECTOMY BREAST WITH SEED LOCALIZATION;  Surgeon: Eliezer Bates MD;  Location: [facility]         SOCIAL HISTORY:  Social History     Socioeconomic History     Marital status:      Spouse name: Jonah     Number of children: 0     Years of education: 18     Highest education level: Not on file   Occupational History     Occupation:      Employer: [employer]     Comment: masters in social work   Social Needs     Financial resource strain: Not on file     Food insecurity     Worry: Not on file     Inability: Not on file     Transportation needs     Medical: Not on file     Non-medical: Not on file   Tobacco Use     Smoking status: Never Smoker     Smokeless tobacco: Never Used   Substance and Sexual Activity     Alcohol use: Yes     Alcohol/week: 2.0 standard drinks     Types: 2 Standard drinks or equivalent per week     Comment: occasional      Drug use: No     Sexual activity: Yes     Partners: Male     Comment: same relationship since 2000, 3 partners lifelong   Lifestyle     Physical activity     Days per week: Not on file     Minutes per session: Not on file     Stress: Not on file   Relationships     Social  "connections     Talks on phone: Not on file     Gets together: Not on file     Attends Sikh service: Not on file     Active member of club or organization: Not on file     Attends meetings of clubs or organizations: Not on file     Relationship status: Not on file     Intimate partner violence     Fear of current or ex partner: Not on file     Emotionally abused: Not on file     Physically abused: Not on file     Forced sexual activity: Not on file   Other Topics Concern      Service No     Blood Transfusions No     Caffeine Concern No     Comment: 3 a week     Occupational Exposure No     Hobby Hazards No     Sleep Concern No     Comment: 6 hrs     Stress Concern No     Comment: low     Weight Concern No     Special Diet No     Comment: calcium/ high milk intake 2-3 glasses per day     Back Care Yes     Comment: lower back aches     Exercise Yes     Comment: running/weights 50-60 min 5-6 days per week     Bike Helmet Yes     Seat Belt Yes     Self-Exams No     Parent/sibling w/ CABG, MI or angioplasty before 65F 55M? Not Asked   Social History Narrative    Lives with .  Desires no children. Has a dog.  She works as a .         FAMILY HISTORY:  Family History   Problem Relation Age of Onset     Cancer Father         ocular melanoma,  of metastatic diseas age 81     Hypertension Father      Lipids Father      Prostate Cancer Father         onset age 70     Gastrointestinal Disease Father         \"sensitive stomach\"     Skin Cancer Father      Gastrointestinal Disease Mother         cholecystectomy     Eye Disorder Mother         cataract, macular degeneration     Cerebrovascular Disease Mother 85     C.A.D. Maternal Grandmother          of MI age 83     C.A.D. Maternal Grandfather          MI early 60's     C.A.D. Paternal Grandfather          early 50s MI     Breast Cancer Paternal Grandmother          mid 70s     Hypertension Brother          PHYSICAL " EXAM:  Vital signs:  There were no vitals taken for this visit.   ECO  GENERAL/CONSTITUTIONAL: No acute distress. Healthy, alert.  EYES: No scleral icterus.  No redness or discharge.    RESPIRATORY: No audible wheeze, cough, or visible cyanosis.  No visible retractions or increased work of breathing.  Able to speak fully in complete sentences.  MUSCULOSKELETAL: Normal range of motion.  NEUROLOGIC: Alert, oriented, answers questions appropriately. No tremor. Mentation intact and speech normal  INTEGUMENTARY: No jaundice.  No obvious rash or skin lesions.  PSYCHIATRIC:  Mentation appears normal, affect normal/bright, judgement and insight intact, normal speech and appearance well-groomed.    The rest of a comprehensive physical exam is deferred due to public health emergency video visit restrictions.      LABS:  None today.      IMAGING:  Bilateral mammogram 10/11/19:  BI-RADS 2 - benign      ASSESSMENT/PLAN:  Piper Jackson is a 55 year old pre-menopausal, otherwise healthy, female:    1) Invasive ductal carcinoma of right upper inner breast: s/p lumpectomy on 9/14/15, pathology showed grade 1, 1.5 cm size tumor, negative margins, no LVI, negative lymph nodes, ER strongly positive, WI strongly positive, HER2 negative, pT1cN0, stage IA. Oncotype DX score is 12.  She completed radiation 10/19/15-12/1/15.  She started tamoxifen on 12/5/15.      There has been no evidence of recurrence on exam or mammogram.      Patient has been having hot flashes and mood changes on tamoxifen, so she took a break from it.  She had also tapered off of Lexapro.  She says that she never tolerated Lexapro that well, and it caused her decreased sex drive.  Her menstrual period did return after stopping tamoxifen, and labs could not confirm menopausal status.      Piper does want to take tamoxifen.  She tried Effexor and is now up to 75 mg daily.  She tried decreasing tamoxifen down to 10 mg daily, but she doesn't feel that it made a  difference in her hot flash symptoms.  She is okay with going back up to full dose of 20 mg daily.  She is taking a supplement for her hot flashes, which she feels is helpful.    Mammogram on 10/11/19 was benign.    -Continue tamoxifen - plan to treat for 10 years, if can tolerate.  She is on full dose of 20 mg daily.  -continue Effexor ER up to 75 mg daily  -next bilateral screening mammogram in October 2020 - ordered  -RTC in 6 months    Since today is video visit in setting of the COVID-19 pandemic, a breast exam was not able to be done.  Therefore, I offered for a sooner appointment to do exam, but she declined since she is feeling so well.  She would like to keep her next follow-up at 6 months, but will be an in-person exam then.    2) Melanoma in situ: s/p excision at left lateral lower leg  -continue follow-up with dermatology    3) Colon screening: She had screening colonoscopy 10/14/16.  It was normal.  Next colonoscopy was recommended for 10 year from then.    4) Generalized anxiety disorder: She has tapered off of Lexapro.  -she is now on Effexor, as above.  She is doing better now.        Fabiola Kate MD  Hematology/Oncology  [facility]            Again, thank you for allowing me to participate in the care of your patient.        Sincerely,        Fabiola Kate MD

## 2020-08-26 NOTE — PROGRESS NOTES
"Piper Lisa is a 55 year old female who is being evaluated via a billable video visit.      The patient has been notified of following:     \"This video visit will be conducted via a call between you and your physician/provider. We have found that certain health care needs can be provided without the need for an in-person physical exam.  This service lets us provide the care you need with a video conversation.  If a prescription is necessary we can send it directly to your pharmacy.  If lab work is needed we can place an order for that and you can then stop by our lab to have the test done at a later time.    Video visits are billed at different rates depending on your insurance coverage.  Please reach out to your insurance provider with any questions.    If during the course of the call the physician/provider feels a video visit is not appropriate, you will not be charged for this service.\"    Patient has given verbal consent for Video visit? Yes  How would you like to obtain your AVS? MyChart  If you are dropped from the video visit, the video invite should be resent to: Text to cell phone: [phone]  Will anyone else be joining your video visit? No    Video-Visit Details    Type of service:  Video Visit    Video Start Time: 10:12 am  Video End Time: 10:18 am    Originating Location (pt. Location): Home    Distant Location (provider location):  [facility]     Platform used for Video Visit: Jasmeet Duff MA      [facility]    Hematology/Oncology Established Patient Note      Today's Date: 8/26/2020    Reason for Follow-up: Right invasive ductal carcinoma of breast s/p lumpectomy on 9/14/15, grade 1, 1.5 cm size tumor, negative margins, no LVI, negative lymph nodes, ER strongly positive, GA strongly positive, HER2 negative, pT1cN0, stage IA      HISTORY OF PRESENT ILLNESS: Piper Jackson is a 55 year old pre-menopausal female who presented with newly diagnosed " right-sided breast cancer.  Piper underwent her regular bilateral screening mammogram on 8/18/15, where a focal asymmetry in the right upper inner breast was found.  She was not having any symptoms at the time.  She denies feeling a breast lump/bump; no rash or nipple discharge.  An ultrasound was done, which found a 1.2 cm heterogenous focal lesion at the 1:00 position, 5 cm from the nipple.       She underwent lumpectomy on 9/14/15, pathology showed grade 1, 1.5 cm size tumor, negative margins, no LVI, negative lymph nodes, ER strongly positive, MT strongly positive, HER2 negative, pT1cN0, stage IA.  Oncotype DX score is 12.  She completed radiation treatment 10/19/15-12/1/15.  She started tamoxifen on 12/5/15.      INTERIM HISTORY: Piper says that she is doing fine.  She continues to take tamoxifen 20 mg daily and tolerating fine.  She still gets hot flashes, but feels that it is tolerable.  She is happy with Effexor as well.      REVIEW OF SYSTEMS:   14 point ROS was reviewed and is negative other than as noted above in HPI.       HOME MEDICATIONS:  Current Outpatient Medications   Medication Sig Dispense Refill     Calcium Citrate-Vitamin D (CALCIUM + D PO)        tamoxifen (NOLVADEX) 10 MG tablet Take 1 tablet (10 mg) by mouth daily 90 tablet 3     tamoxifen 20 MG PO tablet Take 1 tablet (20 mg) by mouth daily 90 tablet 3     venlafaxine (EFFEXOR-ER) 75 MG 24 hr tablet Take 1 tablet (75 mg) by mouth daily 90 tablet 3     LORazepam (ATIVAN) 1 MG tablet Take 1 tablet (1 mg) by mouth every 8 hours as needed for anxiety or sleep (Patient not taking: Reported on 2/21/2020) 15 tablet 0         ALLERGIES:  Allergies   Allergen Reactions     No Clinical Screening - See Comments Rash     metal         PAST MEDICAL HISTORY:  Past Medical History:   Diagnosis Date     Generalized anxiety disorder 2002     Hirsutism     facial hair     Migraine          PAST SURGICAL HISTORY:  Past Surgical History:   Procedure Laterality  Date     ARTHROSCOPY KNEE RT/LT  2007    right ,degenerative changes joint and meniscus     AS EXC MALIG SKIN LESION TRUNK/ARM/LEG 0.6-1.0 CM      melanoma     BIOPSY NODE SENTINEL Right 9/14/2015    Procedure: BIOPSY NODE SENTINEL;  Surgeon: Eliezer Bates MD;  Location: [facility]     COLONOSCOPY N/A 10/14/2016    Procedure: COLONOSCOPY;  Surgeon: Eliezer Bates MD;  Location:  GI     HC KNEE SCOPE, DIAGNOSTIC  1985    right, medial meniscus injury     LUMPECTOMY BREAST WITH SEED LOCALIZATION Right 9/14/2015    Procedure: LUMPECTOMY BREAST WITH SEED LOCALIZATION;  Surgeon: Eliezer Bates MD;  Location: [facility]         SOCIAL HISTORY:  Social History     Socioeconomic History     Marital status:      Spouse name: Jonah     Number of children: 0     Years of education: 18     Highest education level: Not on file   Occupational History     Occupation:      Employer: [employer]     Comment: masters in social work   Social Needs     Financial resource strain: Not on file     Food insecurity     Worry: Not on file     Inability: Not on file     Transportation needs     Medical: Not on file     Non-medical: Not on file   Tobacco Use     Smoking status: Never Smoker     Smokeless tobacco: Never Used   Substance and Sexual Activity     Alcohol use: Yes     Alcohol/week: 2.0 standard drinks     Types: 2 Standard drinks or equivalent per week     Comment: occasional      Drug use: No     Sexual activity: Yes     Partners: Male     Comment: same relationship since 2000, 3 partners lifelong   Lifestyle     Physical activity     Days per week: Not on file     Minutes per session: Not on file     Stress: Not on file   Relationships     Social connections     Talks on phone: Not on file     Gets together: Not on file     Attends Voodoo service: Not on file     Active member of club or organization: Not on file     Attends meetings of clubs or organizations: Not on file      "Relationship status: Not on file     Intimate partner violence     Fear of current or ex partner: Not on file     Emotionally abused: Not on file     Physically abused: Not on file     Forced sexual activity: Not on file   Other Topics Concern      Service No     Blood Transfusions No     Caffeine Concern No     Comment: 3 a week     Occupational Exposure No     Hobby Hazards No     Sleep Concern No     Comment: 6 hrs     Stress Concern No     Comment: low     Weight Concern No     Special Diet No     Comment: calcium/ high milk intake 2-3 glasses per day     Back Care Yes     Comment: lower back aches     Exercise Yes     Comment: running/weights 50-60 min 5-6 days per week     Bike Helmet Yes     Seat Belt Yes     Self-Exams No     Parent/sibling w/ CABG, MI or angioplasty before 65F 55M? Not Asked   Social History Narrative    Lives with .  Desires no children. Has a dog.  She works as a .         FAMILY HISTORY:  Family History   Problem Relation Age of Onset     Cancer Father         ocular melanoma,  of metastatic diseas age 81     Hypertension Father      Lipids Father      Prostate Cancer Father         onset age 70     Gastrointestinal Disease Father         \"sensitive stomach\"     Skin Cancer Father      Gastrointestinal Disease Mother         cholecystectomy     Eye Disorder Mother         cataract, macular degeneration     Cerebrovascular Disease Mother 85     C.A.D. Maternal Grandmother          of MI age 83     C.A.D. Maternal Grandfather          MI early 60's     C.A.D. Paternal Grandfather          early 50s MI     Breast Cancer Paternal Grandmother          mid 70s     Hypertension Brother          PHYSICAL EXAM:  Vital signs:  There were no vitals taken for this visit.   ECO  GENERAL/CONSTITUTIONAL: No acute distress. Healthy, alert.  EYES: No scleral icterus.  No redness or discharge.    RESPIRATORY: No audible wheeze, cough, or visible " cyanosis.  No visible retractions or increased work of breathing.  Able to speak fully in complete sentences.  MUSCULOSKELETAL: Normal range of motion.  NEUROLOGIC: Alert, oriented, answers questions appropriately. No tremor. Mentation intact and speech normal  INTEGUMENTARY: No jaundice.  No obvious rash or skin lesions.  PSYCHIATRIC:  Mentation appears normal, affect normal/bright, judgement and insight intact, normal speech and appearance well-groomed.    The rest of a comprehensive physical exam is deferred due to public health emergency video visit restrictions.      LABS:  None today.      IMAGING:  Bilateral mammogram 10/11/19:  BI-RADS 2 - benign      ASSESSMENT/PLAN:  Piper Jackson is a 55 year old pre-menopausal, otherwise healthy, female:    1) Invasive ductal carcinoma of right upper inner breast: s/p lumpectomy on 9/14/15, pathology showed grade 1, 1.5 cm size tumor, negative margins, no LVI, negative lymph nodes, ER strongly positive, VA strongly positive, HER2 negative, pT1cN0, stage IA. Oncotype DX score is 12.  She completed radiation 10/19/15-12/1/15.  She started tamoxifen on 12/5/15.      There has been no evidence of recurrence on exam or mammogram.      Patient has been having hot flashes and mood changes on tamoxifen, so she took a break from it.  She had also tapered off of Lexapro.  She says that she never tolerated Lexapro that well, and it caused her decreased sex drive.  Her menstrual period did return after stopping tamoxifen, and labs could not confirm menopausal status.      Piper does want to take tamoxifen.  She tried Effexor and is now up to 75 mg daily.  She tried decreasing tamoxifen down to 10 mg daily, but she doesn't feel that it made a difference in her hot flash symptoms.  She is okay with going back up to full dose of 20 mg daily.  She is taking a supplement for her hot flashes, which she feels is helpful.    Mammogram on 10/11/19 was benign.    -Continue tamoxifen - plan to  treat for 10 years, if can tolerate.  She is on full dose of 20 mg daily.  -continue Effexor ER up to 75 mg daily  -next bilateral screening mammogram in October 2020 - ordered  -RTC in 6 months    Since today is video visit in setting of the COVID-19 pandemic, a breast exam was not able to be done.  Therefore, I offered for a sooner appointment to do exam, but she declined since she is feeling so well.  She would like to keep her next follow-up at 6 months, but will be an in-person exam then.    2) Melanoma in situ: s/p excision at left lateral lower leg  -continue follow-up with dermatology    3) Colon screening: She had screening colonoscopy 10/14/16.  It was normal.  Next colonoscopy was recommended for 10 year from then.    4) Generalized anxiety disorder: She has tapered off of Lexapro.  -she is now on Effexor, as above.  She is doing better now.        Fabiola Kate MD  Hematology/Oncology  [facility]

## 2020-08-26 NOTE — LETTER
"    8/26/2020         RE: Piper Lisa  [address]  [facility] [address]        Dear Colleague,    Thank you for referring your patient, Piper Lisa, to the [facility]. Please see a copy of my visit note below.    Piper Lisa is a 55 year old female who is being evaluated via a billable video visit.      The patient has been notified of following:     \"This video visit will be conducted via a call between you and your physician/provider. We have found that certain health care needs can be provided without the need for an in-person physical exam.  This service lets us provide the care you need with a video conversation.  If a prescription is necessary we can send it directly to your pharmacy.  If lab work is needed we can place an order for that and you can then stop by our lab to have the test done at a later time.    Video visits are billed at different rates depending on your insurance coverage.  Please reach out to your insurance provider with any questions.    If during the course of the call the physician/provider feels a video visit is not appropriate, you will not be charged for this service.\"    Patient has given verbal consent for Video visit? Yes  How would you like to obtain your AVS? MyChart  If you are dropped from the video visit, the video invite should be resent to: Text to cell phone: [phone]  Will anyone else be joining your video visit? No    Video-Visit Details    Type of service:  Video Visit    Video Start Time: 10:12 am  Video End Time: 10:18 am    Originating Location (pt. Location): Home    Distant Location (provider location):  [facility]     Platform used for Video Visit: Jasmeet Duff MA      [facility]    Hematology/Oncology Established Patient Note      Today's Date: 8/26/2020    Reason for Follow-up: Right invasive ductal carcinoma of breast s/p lumpectomy on 9/14/15, grade 1, 1.5 cm size tumor, " negative margins, no LVI, negative lymph nodes, ER strongly positive, LA strongly positive, HER2 negative, pT1cN0, stage IA      HISTORY OF PRESENT ILLNESS: Piper Jackson is a 55 year old pre-menopausal female who presented with newly diagnosed right-sided breast cancer.  Piper underwent her regular bilateral screening mammogram on 8/18/15, where a focal asymmetry in the right upper inner breast was found.  She was not having any symptoms at the time.  She denies feeling a breast lump/bump; no rash or nipple discharge.  An ultrasound was done, which found a 1.2 cm heterogenous focal lesion at the 1:00 position, 5 cm from the nipple.       She underwent lumpectomy on 9/14/15, pathology showed grade 1, 1.5 cm size tumor, negative margins, no LVI, negative lymph nodes, ER strongly positive, LA strongly positive, HER2 negative, pT1cN0, stage IA.  Oncotype DX score is 12.  She completed radiation treatment 10/19/15-12/1/15.  She started tamoxifen on 12/5/15.      INTERIM HISTORY: Piper says that she is doing fine.  She continues to take tamoxifen 20 mg daily and tolerating fine.  She still gets hot flashes, but feels that it is tolerable.  She is happy with Effexor as well.      REVIEW OF SYSTEMS:   14 point ROS was reviewed and is negative other than as noted above in HPI.       HOME MEDICATIONS:  Current Outpatient Medications   Medication Sig Dispense Refill     Calcium Citrate-Vitamin D (CALCIUM + D PO)        tamoxifen (NOLVADEX) 10 MG tablet Take 1 tablet (10 mg) by mouth daily 90 tablet 3     tamoxifen 20 MG PO tablet Take 1 tablet (20 mg) by mouth daily 90 tablet 3     venlafaxine (EFFEXOR-ER) 75 MG 24 hr tablet Take 1 tablet (75 mg) by mouth daily 90 tablet 3     LORazepam (ATIVAN) 1 MG tablet Take 1 tablet (1 mg) by mouth every 8 hours as needed for anxiety or sleep (Patient not taking: Reported on 2/21/2020) 15 tablet 0         ALLERGIES:  Allergies   Allergen Reactions     No Clinical Screening - See Comments  Rash     metal         PAST MEDICAL HISTORY:  Past Medical History:   Diagnosis Date     Generalized anxiety disorder 2002     Hirsutism     facial hair     Migraine          PAST SURGICAL HISTORY:  Past Surgical History:   Procedure Laterality Date     ARTHROSCOPY KNEE RT/LT  2007    right ,degenerative changes joint and meniscus     AS EXC MALIG SKIN LESION TRUNK/ARM/LEG 0.6-1.0 CM      melanoma     BIOPSY NODE SENTINEL Right 9/14/2015    Procedure: BIOPSY NODE SENTINEL;  Surgeon: Eliezer Bates MD;  Location: [facility]     COLONOSCOPY N/A 10/14/2016    Procedure: COLONOSCOPY;  Surgeon: Eliezer Bates MD;  Location:  GI     HC KNEE SCOPE, DIAGNOSTIC  1985    right, medial meniscus injury     LUMPECTOMY BREAST WITH SEED LOCALIZATION Right 9/14/2015    Procedure: LUMPECTOMY BREAST WITH SEED LOCALIZATION;  Surgeon: Eliezer Bates MD;  Location: [facility]         SOCIAL HISTORY:  Social History     Socioeconomic History     Marital status:      Spouse name: Jonah     Number of children: 0     Years of education: 18     Highest education level: Not on file   Occupational History     Occupation:      Employer: [employer]     Comment: masters in social work   Social Needs     Financial resource strain: Not on file     Food insecurity     Worry: Not on file     Inability: Not on file     Transportation needs     Medical: Not on file     Non-medical: Not on file   Tobacco Use     Smoking status: Never Smoker     Smokeless tobacco: Never Used   Substance and Sexual Activity     Alcohol use: Yes     Alcohol/week: 2.0 standard drinks     Types: 2 Standard drinks or equivalent per week     Comment: occasional      Drug use: No     Sexual activity: Yes     Partners: Male     Comment: same relationship since 2000, 3 partners lifelong   Lifestyle     Physical activity     Days per week: Not on file     Minutes per session: Not on file     Stress: Not on file   Relationships     Social  "connections     Talks on phone: Not on file     Gets together: Not on file     Attends Mandaen service: Not on file     Active member of club or organization: Not on file     Attends meetings of clubs or organizations: Not on file     Relationship status: Not on file     Intimate partner violence     Fear of current or ex partner: Not on file     Emotionally abused: Not on file     Physically abused: Not on file     Forced sexual activity: Not on file   Other Topics Concern      Service No     Blood Transfusions No     Caffeine Concern No     Comment: 3 a week     Occupational Exposure No     Hobby Hazards No     Sleep Concern No     Comment: 6 hrs     Stress Concern No     Comment: low     Weight Concern No     Special Diet No     Comment: calcium/ high milk intake 2-3 glasses per day     Back Care Yes     Comment: lower back aches     Exercise Yes     Comment: running/weights 50-60 min 5-6 days per week     Bike Helmet Yes     Seat Belt Yes     Self-Exams No     Parent/sibling w/ CABG, MI or angioplasty before 65F 55M? Not Asked   Social History Narrative    Lives with .  Desires no children. Has a dog.  She works as a .         FAMILY HISTORY:  Family History   Problem Relation Age of Onset     Cancer Father         ocular melanoma,  of metastatic diseas age 81     Hypertension Father      Lipids Father      Prostate Cancer Father         onset age 70     Gastrointestinal Disease Father         \"sensitive stomach\"     Skin Cancer Father      Gastrointestinal Disease Mother         cholecystectomy     Eye Disorder Mother         cataract, macular degeneration     Cerebrovascular Disease Mother 85     C.A.D. Maternal Grandmother          of MI age 83     C.A.D. Maternal Grandfather          MI early 60's     C.A.D. Paternal Grandfather          early 50s MI     Breast Cancer Paternal Grandmother          mid 70s     Hypertension Brother          PHYSICAL " EXAM:  Vital signs:  There were no vitals taken for this visit.   ECO  GENERAL/CONSTITUTIONAL: No acute distress. Healthy, alert.  EYES: No scleral icterus.  No redness or discharge.    RESPIRATORY: No audible wheeze, cough, or visible cyanosis.  No visible retractions or increased work of breathing.  Able to speak fully in complete sentences.  MUSCULOSKELETAL: Normal range of motion.  NEUROLOGIC: Alert, oriented, answers questions appropriately. No tremor. Mentation intact and speech normal  INTEGUMENTARY: No jaundice.  No obvious rash or skin lesions.  PSYCHIATRIC:  Mentation appears normal, affect normal/bright, judgement and insight intact, normal speech and appearance well-groomed.    The rest of a comprehensive physical exam is deferred due to public health emergency video visit restrictions.      LABS:  None today.      IMAGING:  Bilateral mammogram 10/11/19:  BI-RADS 2 - benign      ASSESSMENT/PLAN:  Piper Jackson is a 55 year old pre-menopausal, otherwise healthy, female:    1) Invasive ductal carcinoma of right upper inner breast: s/p lumpectomy on 9/14/15, pathology showed grade 1, 1.5 cm size tumor, negative margins, no LVI, negative lymph nodes, ER strongly positive, [place] strongly positive, HER2 negative, pT1cN0, stage IA. Oncotype DX score is 12.  She completed radiation 10/19/15-12/1/15.  She started tamoxifen on 12/5/15.      There has been no evidence of recurrence on exam or mammogram.      Patient has been having hot flashes and mood changes on tamoxifen, so she took a break from it.  She had also tapered off of Lexapro.  She says that she never tolerated Lexapro that well, and it caused her decreased sex drive.  Her menstrual period did return after stopping tamoxifen, and labs could not confirm menopausal status.      Piper does want to take tamoxifen.  She tried Effexor and is now up to 75 mg daily.  She tried decreasing tamoxifen down to 10 mg daily, but she doesn't feel that it made a  difference in her hot flash symptoms.  She is okay with going back up to full dose of 20 mg daily.  She is taking a supplement for her hot flashes, which she feels is helpful.    Mammogram on 10/11/19 was benign.    -Continue tamoxifen - plan to treat for 10 years, if can tolerate.  She is on full dose of 20 mg daily.  -continue Effexor ER up to 75 mg daily  -next bilateral screening mammogram in October 2020 - ordered  -RTC in 6 months    Since today is video visit in setting of the COVID-19 pandemic, a breast exam was not able to be done.  Therefore, I offered for a sooner appointment to do exam, but she declined since she is feeling so well.  She would like to keep her next follow-up at 6 months, but will be an in-person exam then.    2) Melanoma in situ: s/p excision at left lateral lower leg  -continue follow-up with dermatology    3) Colon screening: She had screening colonoscopy 10/14/16.  It was normal.  Next colonoscopy was recommended for 10 year from then.    4) Generalized anxiety disorder: She has tapered off of Lexapro.  -she is now on Effexor, as above.  She is doing better now.        Fabiola Kate MD  Hematology/Oncology  [facility]            Again, thank you for allowing me to participate in the care of your patient.        Sincerely,        Fabiola Kate MD

## 2020-10-16 ENCOUNTER — HOSPITAL ENCOUNTER (OUTPATIENT)
Dept: MAMMOGRAPHY | Facility: CLINIC | Age: 55
Discharge: HOME | End: 2020-10-16
Attending: INTERNAL MEDICINE | Admitting: INTERNAL MEDICINE
Payer: COMMERCIAL

## 2020-10-16 DIAGNOSIS — Z12.39 BREAST SCREENING: ICD-10-CM

## 2020-10-16 PROCEDURE — 77063 BREAST TOMOSYNTHESIS BI: CPT

## 2021-01-15 ENCOUNTER — HEALTH MAINTENANCE LETTER (OUTPATIENT)
Age: 56
End: 2021-01-15

## 2021-02-24 ENCOUNTER — VIRTUAL VISIT (OUTPATIENT)
Dept: ONCOLOGY | Facility: CLINIC | Age: 56
End: 2021-02-24
Attending: INTERNAL MEDICINE
Payer: COMMERCIAL

## 2021-02-24 DIAGNOSIS — Z17.0 MALIGNANT NEOPLASM OF UPPER-INNER QUADRANT OF RIGHT BREAST IN FEMALE, ESTROGEN RECEPTOR POSITIVE (H): Primary | ICD-10-CM

## 2021-02-24 DIAGNOSIS — C50.211 MALIGNANT NEOPLASM OF UPPER-INNER QUADRANT OF RIGHT BREAST IN FEMALE, ESTROGEN RECEPTOR POSITIVE (H): Primary | ICD-10-CM

## 2021-02-24 PROCEDURE — 999N001193 HC VIDEO/TELEPHONE VISIT; NO CHARGE

## 2021-02-24 PROCEDURE — 99213 OFFICE O/P EST LOW 20 MIN: CPT | Mod: 95 | Performed by: INTERNAL MEDICINE

## 2021-02-24 NOTE — LETTER
2/24/2021         RE: Piper Lisa  [address]  [facility] [address]        Dear Colleague,    Thank you for referring your patient, Piper Lisa, to the [facility]. Please see a copy of my visit note below.    Piper is a 55 year old who is being evaluated via a billable video visit.      How would you like to obtain your AVS? MyChart  If the video visit is dropped, the invitation should be resent by: Text to cell phone: [employer]mniture  Will anyone else be joining your video visit? No    Video Start Time: 2:37 pm     Video-Visit Details    Type of service:  Video Visit    Video End Time:2:46 PM    Originating Location (pt. Location): Home    Distant Location (provider location):  [facility]     Platform used for Video Visit: Our[employer]         [facility]    Hematology/Oncology Established Patient Note      Today's Date: 2/24/2021    Reason for Follow-up: Right invasive ductal carcinoma of breast s/p lumpectomy on 9/14/15, grade 1, 1.5 cm size tumor, negative margins, no LVI, negative lymph nodes, ER strongly positive, NJ strongly positive, HER2 negative, pT1cN0, stage IA      HISTORY OF PRESENT ILLNESS: Piper Jackson is a 55 year old pre-menopausal female who presented with newly diagnosed right-sided breast cancer.  Piper underwent her regular bilateral screening mammogram on 8/18/15, where a focal asymmetry in the right upper inner breast was found.  She was not having any symptoms at the time.  She denies feeling a breast lump/bump; no rash or nipple discharge.  An ultrasound was done, which found a 1.2 cm heterogenous focal lesion at the 1:00 position, 5 cm from the nipple.       She underwent lumpectomy on 9/14/15, pathology showed grade 1, 1.5 cm size tumor, negative margins, no LVI, negative lymph nodes, ER strongly positive, NJ strongly positive, HER2 negative, pT1cN0, stage IA.  Oncotype DX score is 12.  She completed  radiation treatment 10/19/15-12/1/15.  She started tamoxifen on 12/5/15.      INTERIM HISTORY: Piper is doing really well.  However, she still has trouble tolerating the tamoxifen.  She still gets hot flashes and hair loss, and she doesn't know if she can take it for a full 10 years.      REVIEW OF SYSTEMS:   14 point ROS was reviewed and is negative other than as noted above in HPI.       HOME MEDICATIONS:  Current Outpatient Medications   Medication Sig Dispense Refill     Calcium Citrate-Vitamin D (CALCIUM + D PO)        LORazepam (ATIVAN) 1 MG tablet Take 1 tablet (1 mg) by mouth every 8 hours as needed for anxiety or sleep (Patient not taking: Reported on 2/21/2020) 15 tablet 0     tamoxifen (NOLVADEX) 10 MG tablet Take 1 tablet (10 mg) by mouth daily 90 tablet 3     tamoxifen 20 MG PO tablet Take 1 tablet (20 mg) by mouth daily 90 tablet 3     venlafaxine (EFFEXOR-ER) 75 MG 24 hr tablet Take 1 tablet (75 mg) by mouth daily 90 tablet 3         ALLERGIES:  Allergies   Allergen Reactions     No Clinical Screening - See Comments Rash     metal         PAST MEDICAL HISTORY:  Past Medical History:   Diagnosis Date     Generalized anxiety disorder 2002     Hirsutism     facial hair     Migraine          PAST SURGICAL HISTORY:  Past Surgical History:   Procedure Laterality Date     ARTHROSCOPY KNEE RT/LT  2007    right ,degenerative changes joint and meniscus     AS EXC MALIG SKIN LESION TRUNK/ARM/LEG 0.6-1.0 CM      melanoma     BIOPSY NODE SENTINEL Right 9/14/2015    Procedure: BIOPSY NODE SENTINEL;  Surgeon: Eliezer Bates MD;  Location: [facility]     COLONOSCOPY N/A 10/14/2016    Procedure: COLONOSCOPY;  Surgeon: Eliezer Bates MD;  Location:  GI     HC KNEE SCOPE, DIAGNOSTIC  1985    right, medial meniscus injury     LUMPECTOMY BREAST WITH SEED LOCALIZATION Right 9/14/2015    Procedure: LUMPECTOMY BREAST WITH SEED LOCALIZATION;  Surgeon: Eliezer Bates MD;  Location: [facility]         SOCIAL  HISTORY:  Social History     Socioeconomic History     Marital status:      Spouse name: Jonah     Number of children: 0     Years of education: 18     Highest education level: Not on file   Occupational History     Occupation:      Employer: [employer]     Comment: masters in social work   Social Needs     Financial resource strain: Not on file     Food insecurity     Worry: Not on file     Inability: Not on file     Transportation needs     Medical: Not on file     Non-medical: Not on file   Tobacco Use     Smoking status: Never Smoker     Smokeless tobacco: Never Used   Substance and Sexual Activity     Alcohol use: Yes     Alcohol/week: 2.0 standard drinks     Types: 2 Standard drinks or equivalent per week     Comment: occasional      Drug use: No     Sexual activity: Yes     Partners: Male     Comment: same relationship since 2000, 3 partners lifelong   Lifestyle     Physical activity     Days per week: Not on file     Minutes per session: Not on file     Stress: Not on file   Relationships     Social connections     Talks on phone: Not on file     Gets together: Not on file     Attends Baptism service: Not on file     Active member of club or organization: Not on file     Attends meetings of clubs or organizations: Not on file     Relationship status: Not on file     Intimate partner violence     Fear of current or ex partner: Not on file     Emotionally abused: Not on file     Physically abused: Not on file     Forced sexual activity: Not on file   Other Topics Concern      Service No     Blood Transfusions No     Caffeine Concern No     Comment: 3 a week     Occupational Exposure No     Hobby Hazards No     Sleep Concern No     Comment: 6 hrs     Stress Concern No     Comment: low     Weight Concern No     Special Diet No     Comment: calcium/ high milk intake 2-3 glasses per day     Back Care Yes     Comment: lower back aches     Exercise Yes     Comment:  "running/weights 50-60 min 5-6 days per week     Bike Helmet Yes     Seat Belt Yes     Self-Exams No     Parent/sibling w/ CABG, MI or angioplasty before 65F 55M? Not Asked   Social History Narrative    Lives with .  Desires no children. Has a dog.  She works as a .         FAMILY HISTORY:  Family History   Problem Relation Age of Onset     Cancer Father         ocular melanoma,  of metastatic diseas age 81     Hypertension Father      Lipids Father      Prostate Cancer Father         onset age 70     Gastrointestinal Disease Father         \"sensitive stomach\"     Skin Cancer Father      Gastrointestinal Disease Mother         cholecystectomy     Eye Disorder Mother         cataract, macular degeneration     Cerebrovascular Disease Mother 85     C.A.D. Maternal Grandmother          of MI age 83     C.A.D. Maternal Grandfather          MI early 60's     C.A.D. Paternal Grandfather          early 50s MI     Breast Cancer Paternal Grandmother          mid 70s     Hypertension Brother          PHYSICAL EXAM:  ECO  GENERAL/CONSTITUTIONAL: No acute distress. Healthy, alert.  EYES: No scleral icterus.  No redness or discharge.    RESPIRATORY: No audible wheeze, cough, or visible cyanosis.  No visible retractions or increased work of breathing.  Able to speak fully in complete sentences.  NEUROLOGIC: Alert, oriented, answers questions appropriately. No tremor. Mentation intact and speech normal  INTEGUMENTARY: No jaundice.  PSYCHIATRIC:  Mentation appears normal, affect normal/bright, judgement and insight intact, normal speech and appearance well-groomed.    The rest of a comprehensive physical exam is deferred due to public health emergency video visit restrictions.      LABS:  None today.      IMAGING:  Bilateral mammogram 10/16/20:  BI-RADS 2 - benign      ASSESSMENT/PLAN:  Piper Jackson is a 55 year old pre-menopausal, otherwise healthy, female:    1) Invasive ductal " carcinoma of right upper inner breast: s/p lumpectomy on 9/14/15, pathology showed grade 1, 1.5 cm size tumor, negative margins, no LVI, negative lymph nodes, ER strongly positive, RI strongly positive, HER2 negative, pT1cN0, stage IA. Oncotype DX score is 12.  She completed radiation 10/19/15-12/1/15.  She started tamoxifen on 12/5/15.      There has been no evidence of recurrence on exam or mammogram.      She has completed 5 years of tamoxifen.  We had discussed trying for 10 years treatment.  However, she continues to have side effects of hot flashes and hair thinning.  She doesn't think she can complete 10 years.  We reviewed her Oncotype score and her overall risk again.  She had a stage IA breast cancer and relatively low Oncotype score.  I feel it would be reasonable to stop the tamoxifen after completing 5 years, especially as it is impeding on her quality of life significantly.  She says that she will think about it and let me know if she decides to stop it.      Mammogram on 10/16/20 was benign.    -She is on tamoxifen 20 mg daily.  She is going to think about it and let me know if she decides to stop it.      -continue Effexor ER up to 75 mg daily.  If she stops the tamoxifen, then we may also taper off of the Effexor, since we had started it for the hot flashes related to the tamoxifen.    -next bilateral screening mammogram in October 2021 - will order at the next visit  -RTC in 6 months    2) Melanoma in situ: s/p excision at left lateral lower leg  -continue follow-up with dermatology    3) Colon screening: She had screening colonoscopy 10/14/16.  It was normal.  Next colonoscopy was recommended for 10 year from then.    4) Generalized anxiety disorder: She has tapered off of Lexapro.  -she is now on Effexor, as above.  She is doing better now.        Fabiola Kate MD  Hematology/Oncology  [facility]      Total time spent on day of visit, including review of tests,  obtaining/reviewing separately obtained history, ordering medications/tests/procedures, communicating with PCP/consultants, and documenting in electronic medical record: 25 minutes          Again, thank you for allowing me to participate in the care of your patient.        Sincerely,        Fabiola Kate MD

## 2021-02-24 NOTE — PROGRESS NOTES
Piper is a 55 year old who is being evaluated via a billable video visit.      How would you like to obtain your AVS? [email or web address]  If the video visit is dropped, the invitation should be resent by: Text to cell phone: Lesia  Will anyone else be joining your video visit? No    Video Start Time: 2:37 pm     Video-Visit Details    Type of service:  Video Visit    Video End Time:2:46 PM    Originating Location (pt. Location): Home    Distant Location (provider location):  [facility]     Platform used for Video Visit: [employer]         [facility]    Hematology/Oncology Established Patient Note      Today's Date: 2/24/2021    Reason for Follow-up: Right invasive ductal carcinoma of breast s/p lumpectomy on 9/14/15, grade 1, 1.5 cm size tumor, negative margins, no LVI, negative lymph nodes, ER strongly positive, WA strongly positive, HER2 negative, pT1cN0, stage IA      HISTORY OF PRESENT ILLNESS: Piper Jackson is a 55 year old pre-menopausal female who presented with newly diagnosed right-sided breast cancer.  Piper underwent her regular bilateral screening mammogram on 8/18/15, where a focal asymmetry in the right upper inner breast was found.  She was not having any symptoms at the time.  She denies feeling a breast lump/bump; no rash or nipple discharge.  An ultrasound was done, which found a 1.2 cm heterogenous focal lesion at the 1:00 position, 5 cm from the nipple.       She underwent lumpectomy on 9/14/15, pathology showed grade 1, 1.5 cm size tumor, negative margins, no LVI, negative lymph nodes, ER strongly positive, WA strongly positive, HER2 negative, pT1cN0, stage IA.  Oncotype DX score is 12.  She completed radiation treatment 10/19/15-12/1/15.  She started tamoxifen on 12/5/15.      INTERIM HISTORY: Piper is doing really well.  However, she still has trouble tolerating the tamoxifen.  She still gets hot flashes and hair loss, and she doesn't know if she can take it for  a full 10 years.      REVIEW OF SYSTEMS:   14 point ROS was reviewed and is negative other than as noted above in HPI.       HOME MEDICATIONS:  Current Outpatient Medications   Medication Sig Dispense Refill     Calcium Citrate-Vitamin D (CALCIUM + D PO)        LORazepam (ATIVAN) 1 MG tablet Take 1 tablet (1 mg) by mouth every 8 hours as needed for anxiety or sleep (Patient not taking: Reported on 2/21/2020) 15 tablet 0     tamoxifen (NOLVADEX) 10 MG tablet Take 1 tablet (10 mg) by mouth daily 90 tablet 3     tamoxifen 20 MG PO tablet Take 1 tablet (20 mg) by mouth daily 90 tablet 3     venlafaxine (EFFEXOR-ER) 75 MG 24 hr tablet Take 1 tablet (75 mg) by mouth daily 90 tablet 3         ALLERGIES:  Allergies   Allergen Reactions     No Clinical Screening - See Comments Rash     metal         PAST MEDICAL HISTORY:  Past Medical History:   Diagnosis Date     Generalized anxiety disorder 2002     Hirsutism     facial hair     Migraine          PAST SURGICAL HISTORY:  Past Surgical History:   Procedure Laterality Date     ARTHROSCOPY KNEE RT/LT  2007    right ,degenerative changes joint and meniscus     AS EXC MALIG SKIN LESION TRUNK/ARM/LEG 0.6-1.0 CM      melanoma     BIOPSY NODE SENTINEL Right 9/14/2015    Procedure: BIOPSY NODE SENTINEL;  Surgeon: Eliezer Bates MD;  Location: [facility]     COLONOSCOPY N/A 10/14/2016    Procedure: COLONOSCOPY;  Surgeon: Eliezer Bates MD;  Location:  GI      KNEE SCOPE, DIAGNOSTIC  1985    right, medial meniscus injury     LUMPECTOMY BREAST WITH SEED LOCALIZATION Right 9/14/2015    Procedure: LUMPECTOMY BREAST WITH SEED LOCALIZATION;  Surgeon: Eliezer Bates MD;  Location: [facility]         SOCIAL HISTORY:  Social History     Socioeconomic History     Marital status:      Spouse name: Jonah     Number of children: 0     Years of education: 18     Highest education level: Not on file   Occupational History     Occupation:      Employer: [employer]     Comment: masters in social work   Social Needs     Financial resource strain: Not on file     Food insecurity     Worry: Not on file     Inability: Not on file     Transportation needs     Medical: Not on file     Non-medical: Not on file   Tobacco Use     Smoking status: Never Smoker     Smokeless tobacco: Never Used   Substance and Sexual Activity     Alcohol use: Yes     Alcohol/week: 2.0 standard drinks     Types: 2 Standard drinks or equivalent per week     Comment: occasional      Drug use: No     Sexual activity: Yes     Partners: Male     Comment: same relationship since 2000, 3 partners lifelong   Lifestyle     Physical activity     Days per week: Not on file     Minutes per session: Not on file     Stress: Not on file   Relationships     Social connections     Talks on phone: Not on file     Gets together: Not on file     Attends Yazidism service: Not on file     Active member of club or organization: Not on file     Attends meetings of clubs or organizations: Not on file     Relationship status: Not on file     Intimate partner violence     Fear of current or ex partner: Not on file     Emotionally abused: Not on file     Physically abused: Not on file     Forced sexual activity: Not on file   Other Topics Concern      Service No     Blood Transfusions No     Caffeine Concern No     Comment: 3 a week     Occupational Exposure No     Hobby Hazards No     Sleep Concern No     Comment: 6 hrs     Stress Concern No     Comment: low     Weight Concern No     Special Diet No     Comment: calcium/ high milk intake 2-3 glasses per day     Back Care Yes     Comment: lower back aches     Exercise Yes     Comment: running/weights 50-60 min 5-6 days per week     Bike Helmet Yes     Seat Belt Yes     Self-Exams No     Parent/sibling w/ CABG, MI or angioplasty before 65F 55M? Not Asked   Social History Narrative    Lives with .  Desires no children. Has a dog.  She works as a school  ".         FAMILY HISTORY:  Family History   Problem Relation Age of Onset     Cancer Father         ocular melanoma,  of metastatic diseas age 81     Hypertension Father      Lipids Father      Prostate Cancer Father         onset age 70     Gastrointestinal Disease Father         \"sensitive stomach\"     Skin Cancer Father      Gastrointestinal Disease Mother         cholecystectomy     Eye Disorder Mother         cataract, macular degeneration     Cerebrovascular Disease Mother 85     C.A.D. Maternal Grandmother          of MI age 83     C.A.D. Maternal Grandfather          MI early 60's     C.A.D. Paternal Grandfather          early 50s MI     Breast Cancer Paternal Grandmother          mid 70s     Hypertension Brother          PHYSICAL EXAM:  ECO  GENERAL/CONSTITUTIONAL: No acute distress. Healthy, alert.  EYES: No scleral icterus.  No redness or discharge.    RESPIRATORY: No audible wheeze, cough, or visible cyanosis.  No visible retractions or increased work of breathing.  Able to speak fully in complete sentences.  NEUROLOGIC: Alert, oriented, answers questions appropriately. No tremor. Mentation intact and speech normal  INTEGUMENTARY: No jaundice.  PSYCHIATRIC:  Mentation appears normal, affect normal/bright, judgement and insight intact, normal speech and appearance well-groomed.    The rest of a comprehensive physical exam is deferred due to public health emergency video visit restrictions.      LABS:  None today.      IMAGING:  Bilateral mammogram 10/16/20:  BI-RADS 2 - benign      ASSESSMENT/PLAN:  Piper Jackson is a 55 year old pre-menopausal, otherwise healthy, female:    1) Invasive ductal carcinoma of right upper inner breast: s/p lumpectomy on 9/14/15, pathology showed grade 1, 1.5 cm size tumor, negative margins, no LVI, negative lymph nodes, ER strongly positive, [place] strongly positive, HER2 negative, pT1cN0, stage IA. Oncotype DX score is 12.  She completed " radiation 10/19/15-12/1/15.  She started tamoxifen on 12/5/15.      There has been no evidence of recurrence on exam or mammogram.      She has completed 5 years of tamoxifen.  We had discussed trying for 10 years treatment.  However, she continues to have side effects of hot flashes and hair thinning.  She doesn't think she can complete 10 years.  We reviewed her Oncotype score and her overall risk again.  She had a stage IA breast cancer and relatively low Oncotype score.  I feel it would be reasonable to stop the tamoxifen after completing 5 years, especially as it is impeding on her quality of life significantly.  She says that she will think about it and let me know if she decides to stop it.      Mammogram on 10/16/20 was benign.    -She is on tamoxifen 20 mg daily.  She is going to think about it and let me know if she decides to stop it.      -continue Effexor ER up to 75 mg daily.  If she stops the tamoxifen, then we may also taper off of the Effexor, since we had started it for the hot flashes related to the tamoxifen.    -next bilateral screening mammogram in October 2021 - will order at the next visit  -RTC in 6 months    2) Melanoma in situ: s/p excision at left lateral lower leg  -continue follow-up with dermatology    3) Colon screening: She had screening colonoscopy 10/14/16.  It was normal.  Next colonoscopy was recommended for 10 year from then.    4) Generalized anxiety disorder: She has tapered off of Lexapro.  -she is now on Effexor, as above.  She is doing better now.        Fabiola Kate MD  Hematology/Oncology  [facility]      Total time spent on day of visit, including review of tests, obtaining/reviewing separately obtained history, ordering medications/tests/procedures, communicating with PCP/consultants, and documenting in electronic medical record: 25 minutes

## 2021-02-24 NOTE — LETTER
2/24/2021         RE: Piper Lisa  [address]  [facility] [address]        Dear Colleague,    Thank you for referring your patient, Piper Lisa, to the [facility]. Please see a copy of my visit note below.    Piper is a 55 year old who is being evaluated via a billable video visit.      How would you like to obtain your AVS? MyChart  If the video visit is dropped, the invitation should be resent by: Text to cell phone: [employer]  Will anyone else be joining your video visit? No    Video Start Time: 2:37 pm     Video-Visit Details    Type of service:  Video Visit    Video End Time:2:46 PM    Originating Location (pt. Location): Home    Distant Location (provider location):  [facility]     Platform used for Video Visit: [employer]         [facility]    Hematology/Oncology Established Patient Note      Today's Date: 2/24/2021    Reason for Follow-up: Right invasive ductal carcinoma of breast s/p lumpectomy on 9/14/15, grade 1, 1.5 cm size tumor, negative margins, no LVI, negative lymph nodes, ER strongly positive, OK strongly positive, HER2 negative, pT1cN0, stage IA      HISTORY OF PRESENT ILLNESS: Piper Jackson is a 55 year old pre-menopausal female who presented with newly diagnosed right-sided breast cancer.  Piper underwent her regular bilateral screening mammogram on 8/18/15, where a focal asymmetry in the right upper inner breast was found.  She was not having any symptoms at the time.  She denies feeling a breast lump/bump; no rash or nipple discharge.  An ultrasound was done, which found a 1.2 cm heterogenous focal lesion at the 1:00 position, 5 cm from the nipple.       She underwent lumpectomy on 9/14/15, pathology showed grade 1, 1.5 cm size tumor, negative margins, no LVI, negative lymph nodes, ER strongly positive, OK strongly positive, HER2 negative, pT1cN0, stage IA.  Oncotype DX score is 12.  She completed  radiation treatment 10/19/15-12/1/15.  She started tamoxifen on 12/5/15.      INTERIM HISTORY: Piper is doing really well.  However, she still has trouble tolerating the tamoxifen.  She still gets hot flashes and hair loss, and she doesn't know if she can take it for a full 10 years.      REVIEW OF SYSTEMS:   14 point ROS was reviewed and is negative other than as noted above in HPI.       HOME MEDICATIONS:  Current Outpatient Medications   Medication Sig Dispense Refill     Calcium Citrate-Vitamin D (CALCIUM + D PO)        LORazepam (ATIVAN) 1 MG tablet Take 1 tablet (1 mg) by mouth every 8 hours as needed for anxiety or sleep (Patient not taking: Reported on 2/21/2020) 15 tablet 0     tamoxifen (NOLVADEX) 10 MG tablet Take 1 tablet (10 mg) by mouth daily 90 tablet 3     tamoxifen 20 MG PO tablet Take 1 tablet (20 mg) by mouth daily 90 tablet 3     venlafaxine (EFFEXOR-ER) 75 MG 24 hr tablet Take 1 tablet (75 mg) by mouth daily 90 tablet 3         ALLERGIES:  Allergies   Allergen Reactions     No Clinical Screening - See Comments Rash     metal         PAST MEDICAL HISTORY:  Past Medical History:   Diagnosis Date     Generalized anxiety disorder 2002     Hirsutism     facial hair     Migraine          PAST SURGICAL HISTORY:  Past Surgical History:   Procedure Laterality Date     ARTHROSCOPY KNEE RT/LT  2007    right ,degenerative changes joint and meniscus     AS EXC MALIG SKIN LESION TRUNK/ARM/LEG 0.6-1.0 CM      melanoma     BIOPSY NODE SENTINEL Right 9/14/2015    Procedure: BIOPSY NODE SENTINEL;  Surgeon: Eliezer Bates MD;  Location: [facility]     COLONOSCOPY N/A 10/14/2016    Procedure: COLONOSCOPY;  Surgeon: Eliezer Bates MD;  Location:  GI     HC KNEE SCOPE, DIAGNOSTIC  1985    right, medial meniscus injury     LUMPECTOMY BREAST WITH SEED LOCALIZATION Right 9/14/2015    Procedure: LUMPECTOMY BREAST WITH SEED LOCALIZATION;  Surgeon: Eliezer Bates MD;  Location: [facility]         SOCIAL  HISTORY:  Social History     Socioeconomic History     Marital status:      Spouse name: Jonah     Number of children: 0     Years of education: 18     Highest education level: Not on file   Occupational History     Occupation:      Employer: [employer]     Comment: masters in social work   Social Needs     Financial resource strain: Not on file     Food insecurity     Worry: Not on file     Inability: Not on file     Transportation needs     Medical: Not on file     Non-medical: Not on file   Tobacco Use     Smoking status: Never Smoker     Smokeless tobacco: Never Used   Substance and Sexual Activity     Alcohol use: Yes     Alcohol/week: 2.0 standard drinks     Types: 2 Standard drinks or equivalent per week     Comment: occasional      Drug use: No     Sexual activity: Yes     Partners: Male     Comment: same relationship since 2000, 3 partners lifelong   Lifestyle     Physical activity     Days per week: Not on file     Minutes per session: Not on file     Stress: Not on file   Relationships     Social connections     Talks on phone: Not on file     Gets together: Not on file     Attends Tenriism service: Not on file     Active member of club or organization: Not on file     Attends meetings of clubs or organizations: Not on file     Relationship status: Not on file     Intimate partner violence     Fear of current or ex partner: Not on file     Emotionally abused: Not on file     Physically abused: Not on file     Forced sexual activity: Not on file   Other Topics Concern      Service No     Blood Transfusions No     Caffeine Concern No     Comment: 3 a week     Occupational Exposure No     Hobby Hazards No     Sleep Concern No     Comment: 6 hrs     Stress Concern No     Comment: low     Weight Concern No     Special Diet No     Comment: calcium/ high milk intake 2-3 glasses per day     Back Care Yes     Comment: lower back aches     Exercise Yes     Comment:  "running/weights 50-60 min 5-6 days per week     Bike Helmet Yes     Seat Belt Yes     Self-Exams No     Parent/sibling w/ CABG, MI or angioplasty before 65F 55M? Not Asked   Social History Narrative    Lives with .  Desires no children. Has a dog.  She works as a .         FAMILY HISTORY:  Family History   Problem Relation Age of Onset     Cancer Father         ocular melanoma,  of metastatic diseas age 81     Hypertension Father      Lipids Father      Prostate Cancer Father         onset age 70     Gastrointestinal Disease Father         \"sensitive stomach\"     Skin Cancer Father      Gastrointestinal Disease Mother         cholecystectomy     Eye Disorder Mother         cataract, macular degeneration     Cerebrovascular Disease Mother 85     C.A.D. Maternal Grandmother          of MI age 83     C.A.D. Maternal Grandfather          MI early 60's     C.A.D. Paternal Grandfather          early 50s MI     Breast Cancer Paternal Grandmother          mid 70s     Hypertension Brother          PHYSICAL EXAM:  ECO  GENERAL/CONSTITUTIONAL: No acute distress. Healthy, alert.  EYES: No scleral icterus.  No redness or discharge.    RESPIRATORY: No audible wheeze, cough, or visible cyanosis.  No visible retractions or increased work of breathing.  Able to speak fully in complete sentences.  NEUROLOGIC: Alert, oriented, answers questions appropriately. No tremor. Mentation intact and speech normal  INTEGUMENTARY: No jaundice.  PSYCHIATRIC:  Mentation appears normal, affect normal/bright, judgement and insight intact, normal speech and appearance well-groomed.    The rest of a comprehensive physical exam is deferred due to public health emergency video visit restrictions.      LABS:  None today.      IMAGING:  Bilateral mammogram 10/16/20:  BI-RADS 2 - benign      ASSESSMENT/PLAN:  Piper Jackson is a 55 year old pre-menopausal, otherwise healthy, female:    1) Invasive ductal " carcinoma of right upper inner breast: s/p lumpectomy on 9/14/15, pathology showed grade 1, 1.5 cm size tumor, negative margins, no LVI, negative lymph nodes, ER strongly positive, [place] strongly positive, HER2 negative, pT1cN0, stage IA. Oncotype DX score is 12.  She completed radiation 10/19/15-12/1/15.  She started tamoxifen on 12/5/15.      There has been no evidence of recurrence on exam or mammogram.      She has completed 5 years of tamoxifen.  We had discussed trying for 10 years treatment.  However, she continues to have side effects of hot flashes and hair thinning.  She doesn't think she can complete 10 years.  We reviewed her Oncotype score and her overall risk again.  She had a stage IA breast cancer and relatively low Oncotype score.  I feel it would be reasonable to stop the tamoxifen after completing 5 years, especially as it is impeding on her quality of life significantly.  She says that she will think about it and let me know if she decides to stop it.      Mammogram on 10/16/20 was benign.    -She is on tamoxifen 20 mg daily.  She is going to think about it and let me know if she decides to stop it.      -continue Effexor ER up to 75 mg daily.  If she stops the tamoxifen, then we may also taper off of the Effexor, since we had started it for the hot flashes related to the tamoxifen.    -next bilateral screening mammogram in October 2021 - will order at the next visit  -RTC in 6 months    2) Melanoma in situ: s/p excision at left lateral lower leg  -continue follow-up with dermatology    3) Colon screening: She had screening colonoscopy 10/14/16.  It was normal.  Next colonoscopy was recommended for 10 year from then.    4) Generalized anxiety disorder: She has tapered off of Lexapro.  -she is now on Effexor, as above.  She is doing better now.        Fabiola Kate MD  Hematology/Oncology  [facility]      Total time spent on day of visit, including review of tests,  obtaining/reviewing separately obtained history, ordering medications/tests/procedures, communicating with PCP/consultants, and documenting in electronic medical record: 25 minutes          Again, thank you for allowing me to participate in the care of your patient.        Sincerely,        Fabiola Kate MD

## 2021-04-02 DIAGNOSIS — C50.211 MALIGNANT NEOPLASM OF UPPER-INNER QUADRANT OF RIGHT BREAST IN FEMALE, ESTROGEN RECEPTOR POSITIVE (H): ICD-10-CM

## 2021-04-02 DIAGNOSIS — Z17.0 MALIGNANT NEOPLASM OF UPPER-INNER QUADRANT OF RIGHT BREAST IN FEMALE, ESTROGEN RECEPTOR POSITIVE (H): ICD-10-CM

## 2021-04-02 DIAGNOSIS — F41.1 GENERALIZED ANXIETY DISORDER: ICD-10-CM

## 2021-04-02 RX ORDER — VENLAFAXINE HYDROCHLORIDE 75 MG/1
75 TABLET, EXTENDED RELEASE ORAL DAILY
Qty: 90 TABLET | Refills: 3 | Status: SHIPPED | OUTPATIENT
Start: 2021-04-02 | End: 2022-01-04

## 2021-08-20 ENCOUNTER — ONCOLOGY VISIT (OUTPATIENT)
Dept: ONCOLOGY | Facility: CLINIC | Age: 56
End: 2021-08-20
Attending: INTERNAL MEDICINE
Payer: COMMERCIAL

## 2021-08-20 VITALS
RESPIRATION RATE: 16 BRPM | DIASTOLIC BLOOD PRESSURE: 76 MMHG | SYSTOLIC BLOOD PRESSURE: 111 MMHG | OXYGEN SATURATION: 97 % | BODY MASS INDEX: 27.19 KG/M2 | HEIGHT: 65 IN | HEART RATE: 80 BPM | WEIGHT: 163.2 LBS | TEMPERATURE: 98.4 F

## 2021-08-20 DIAGNOSIS — Z12.39 BREAST SCREENING: Primary | ICD-10-CM

## 2021-08-20 DIAGNOSIS — Z12.31 VISIT FOR SCREENING MAMMOGRAM: ICD-10-CM

## 2021-08-20 PROCEDURE — 99213 OFFICE O/P EST LOW 20 MIN: CPT | Performed by: INTERNAL MEDICINE

## 2021-08-20 PROCEDURE — G0463 HOSPITAL OUTPT CLINIC VISIT: HCPCS

## 2021-08-20 ASSESSMENT — MIFFLIN-ST. JEOR: SCORE: 1331.15

## 2021-08-20 ASSESSMENT — PAIN SCALES - GENERAL: PAINLEVEL: NO PAIN (0)

## 2021-08-20 NOTE — PROGRESS NOTES
[facility]    Hematology/Oncology Established Patient Note      Today's Date: 8/20/2021    Reason for Follow-up: Right invasive ductal carcinoma of breast s/p lumpectomy on 9/14/15, grade 1, 1.5 cm size tumor, negative margins, no LVI, negative lymph nodes, ER strongly positive, MS strongly positive, HER2 negative, pT1cN0, stage IA      HISTORY OF PRESENT ILLNESS: Piper Jackson is a 56 year old pre-menopausal female who presented with newly diagnosed right-sided breast cancer.  Piper underwent her regular bilateral screening mammogram on 8/18/15, where a focal asymmetry in the right upper inner breast was found.  She was not having any symptoms at the time.  She denies feeling a breast lump/bump; no rash or nipple discharge.  An ultrasound was done, which found a 1.2 cm heterogenous focal lesion at the 1:00 position, 5 cm from the nipple.       She underwent lumpectomy on 9/14/15, pathology showed grade 1, 1.5 cm size tumor, negative margins, no LVI, negative lymph nodes, ER strongly positive, MS strongly positive, HER2 negative, pT1cN0, stage IA.  Oncotype DX score is 12.  She completed radiation treatment 10/19/15-12/1/15.  She started tamoxifen on 12/5/15 and stopped around February 2021.      INTERIM HISTORY: Piper is doing really well.  She stopped tamoxifen after our last visit.  She is feeling really well.  She has no new complaints today.  She would like to stay on Effexor though.      REVIEW OF SYSTEMS:   14 point ROS was reviewed and is negative other than as noted above in HPI.       HOME MEDICATIONS:  Current Outpatient Medications   Medication Sig Dispense Refill     Calcium Citrate-Vitamin D (CALCIUM + D PO)        venlafaxine (EFFEXOR-ER) 75 MG 24 hr tablet Take 1 tablet (75 mg) by mouth daily 90 tablet 3         ALLERGIES:  Allergies   Allergen Reactions     Other [No Clinical Screening - See Comments] Rash     metal         PAST MEDICAL HISTORY:  Past Medical History:   Diagnosis  Date     Generalized anxiety disorder 2002     Hirsutism     facial hair     Migraine          PAST SURGICAL HISTORY:  Past Surgical History:   Procedure Laterality Date     ARTHROSCOPY KNEE RT/LT  2007    right ,degenerative changes joint and meniscus     AS EXC MALIG SKIN LESION TRUNK/ARM/LEG 0.6-1.0 CM      melanoma     BIOPSY NODE SENTINEL Right 9/14/2015    Procedure: BIOPSY NODE SENTINEL;  Surgeon: Eliezer Bates MD;  Location: [facility]     COLONOSCOPY N/A 10/14/2016    Procedure: COLONOSCOPY;  Surgeon: Eliezer Bates MD;  Location:  GI     HC KNEE SCOPE, DIAGNOSTIC  1985    right, medial meniscus injury     LUMPECTOMY BREAST WITH SEED LOCALIZATION Right 9/14/2015    Procedure: LUMPECTOMY BREAST WITH SEED LOCALIZATION;  Surgeon: Eliezer Bates MD;  Location: [facility]         SOCIAL HISTORY:  Social History     Socioeconomic History     Marital status:      Spouse name: Jonah     Number of children: 0     Years of education: 18     Highest education level: Not on file   Occupational History     Occupation:      Employer: [employer]     Comment: masters in social work   Tobacco Use     Smoking status: Never Smoker     Smokeless tobacco: Never Used   Substance and Sexual Activity     Alcohol use: Yes     Alcohol/week: 2.0 standard drinks     Types: 2 Standard drinks or equivalent per week     Comment: occasional      Drug use: No     Sexual activity: Yes     Partners: Male     Comment: same relationship since 2000, 3 partners lifelong   Other Topics Concern      Service No     Blood Transfusions No     Caffeine Concern No     Comment: 3 a week     Occupational Exposure No     Hobby Hazards No     Sleep Concern No     Comment: 6 hrs     Stress Concern No     Comment: low     Weight Concern No     Special Diet No     Comment: calcium/ high milk intake 2-3 glasses per day     Back Care Yes     Comment: lower back aches     Exercise Yes     Comment:  "running/weights 50-60 min 5-6 days per week     Bike Helmet Yes     Seat Belt Yes     Self-Exams No     Parent/sibling w/ CABG, MI or angioplasty before 65F 55M? Not Asked   Social History Narrative    Lives with .  Desires no children. Has a dog.  She works as a .     Social Determinants of Health     Financial Resource Strain:      Difficulty of Paying Living Expenses:    Food Insecurity:      Worried About Running Out of Food in the Last Year:      Ran Out of Food in the Last Year:    Transportation Needs:      Lack of Transportation (Medical):      Lack of Transportation (Non-Medical):    Physical Activity:      Days of Exercise per Week:      Minutes of Exercise per Session:    Stress:      Feeling of Stress :    Social Connections:      Frequency of Communication with Friends and Family:      Frequency of Social Gatherings with Friends and Family:      Attends Scientologist Services:      Active Member of Clubs or Organizations:      Attends Club or Organization Meetings:      Marital Status:    Intimate Partner Violence:      Fear of Current or Ex-Partner:      Emotionally Abused:      Physically Abused:      Sexually Abused:          FAMILY HISTORY:  Family History   Problem Relation Age of Onset     Cancer Father         ocular melanoma,  of metastatic diseas age 81     Hypertension Father      Lipids Father      Prostate Cancer Father         onset age 70     Gastrointestinal Disease Father         \"sensitive stomach\"     Skin Cancer Father      Gastrointestinal Disease Mother         cholecystectomy     Eye Disorder Mother         cataract, macular degeneration     Cerebrovascular Disease Mother 85     C.A.D. Maternal Grandmother          of MI age 83     C.A.D. Maternal Grandfather          MI early 60's     C.A.D. Paternal Grandfather          early 50s MI     Breast Cancer Paternal Grandmother          mid 70s     Hypertension Brother          PHYSICAL " "EXAM:  /76   Pulse 80   Temp 98.4  F (36.9  C) (Oral)   Resp 16   Ht 1.651 m (5' 5\")   Wt 74 kg (163 lb 3.2 oz)   SpO2 97%   BMI 27.16 kg/m    ECO  GENERAL/CONSTITUTIONAL: No acute distress.  EYES: No scleral icterus.  RESPIRATORY: Clear to auscultation bilaterally. No crackles or wheezing.   CARDIOVASCULAR: Regular rate and rhythm without murmurs, gallops, or rubs.  GASTROINTESTINAL: No tenderness. The patient has normal bowel sounds. No guarding.  No distention.  BREAST: Right-s/p lumpectomy; no palpable mass, discharge, rash, or axillary lymphadenopathy.  Left-no palpable mass, discharge, rash, or axillary lymphadenopathy.   MUSCULOSKELETAL: Warm and well-perfused, no cyanosis, clubbing, or edema.  NEUROLOGIC: Alert, oriented, answers questions appropriately.  INTEGUMENTARY: No jaundice.  GAIT: Steady, does not use assistive device      LABS:  None today.      IMAGING:  Bilateral mammogram 10/16/20:  BI-RADS 2 - benign      ASSESSMENT/PLAN:  Piper Jackson is a 56 year old pre-menopausal, otherwise healthy, female:    1) Invasive ductal carcinoma of right upper inner breast: s/p lumpectomy on 9/14/15, pathology showed grade 1, 1.5 cm size tumor, negative margins, no LVI, negative lymph nodes, ER strongly positive, W[place] strongly positive, HER2 negative, pT1cN0, stage IA. Oncotype DX score is 12.  She completed radiation 10/19/15-12/1/15.  She started tamoxifen on 12/5/15 and stopped in 2021.    There has been no evidence of recurrence on exam or mammogram.      She has completed 5 years of tamoxifen.  We had discussed trying for 10 years treatment.  However, she continued to have side effects of hot flashes and hair thinning.  She doesn't think she can complete 10 years.  We previously reviewed her Oncotype score and her overall risk again.  She had a stage IA breast cancer and relatively low Oncotype score.  I feel it would be reasonable to stop the tamoxifen after completing 5 years, " especially as it is impeding on her quality of life significantly.  She did decide to stop the tamoxifen in February 2021.    Mammogram on 10/16/20 was benign.    -continue Effexor ER up to 75 mg daily.  If she stops the tamoxifen, then we may also taper off of the Effexor, since we had started it for the hot flashes related to the tamoxifen.  However, she would like to continue taking the Effexor, as it has worked well for her in terms of mood and anxiety.  We will continue it.    -next bilateral screening mammogram in October 2021 - ordered  -RTC in 1 year    2) Melanoma in situ: s/p excision at left lateral lower leg  -continue follow-up with dermatology    3) Colon screening: She had screening colonoscopy 10/14/16.  It was normal.  Next colonoscopy was recommended for 10 year from then.    4) Generalized anxiety disorder: She has tapered off of Lexapro.  -she is now on Effexor, as above.  She is doing better now.        Fabiola Kate MD  Hematology/Oncology  [facility]      Total time spent on day of visit, including review of tests, obtaining/reviewing separately obtained history, ordering medications/tests/procedures, communicating with PCP/consultants, and documenting in electronic medical record: 20 minutes

## 2021-08-20 NOTE — LETTER
8/20/2021         RE: Piper Lisa  [address]  [facility] [id number]        Dear Colleague,    Thank you for referring your patient, Piper Lisa, to the [facility]. Please see a copy of my visit note below.    [facility]    Hematology/Oncology Established Patient Note      Today's Date: 8/20/2021    Reason for Follow-up: Right invasive ductal carcinoma of breast s/p lumpectomy on 9/14/15, grade 1, 1.5 cm size tumor, negative margins, no LVI, negative lymph nodes, ER strongly positive, [place] strongly positive, HER2 negative, pT1cN0, stage IA      HISTORY OF PRESENT ILLNESS: Piper Jackson is a 56 year old pre-menopausal female who presented with newly diagnosed right-sided breast cancer.  Piper underwent her regular bilateral screening mammogram on 8/18/15, where a focal asymmetry in the right upper inner breast was found.  She was not having any symptoms at the time.  She denies feeling a breast lump/bump; no rash or nipple discharge.  An ultrasound was done, which found a 1.2 cm heterogenous focal lesion at the 1:00 position, 5 cm from the nipple.       She underwent lumpectomy on 9/14/15, pathology showed grade 1, 1.5 cm size tumor, negative margins, no LVI, negative lymph nodes, ER strongly positive, NY strongly positive, HER2 negative, pT1cN0, stage IA.  Oncotype DX score is 12.  She completed radiation treatment 10/19/15-12/1/15.  She started tamoxifen on 12/5/15 and stopped around February 2021.      INTERIM HISTORY: Piper is doing really well.  She stopped tamoxifen after our last visit.  She is feeling really well.  She has no new complaints today.  She would like to stay on Effexor though.      REVIEW OF SYSTEMS:   14 point ROS was reviewed and is negative other than as noted above in HPI.       HOME MEDICATIONS:  Current Outpatient Medications   Medication Sig Dispense Refill     Calcium Citrate-Vitamin D (CALCIUM + D PO)        venlafaxine  (EFFEXOR-ER) 75 MG 24 hr tablet Take 1 tablet (75 mg) by mouth daily 90 tablet 3         ALLERGIES:  Allergies   Allergen Reactions     Other [No Clinical Screening - See Comments] Rash     metal         PAST MEDICAL HISTORY:  Past Medical History:   Diagnosis Date     Generalized anxiety disorder 2002     Hirsutism     facial hair     Migraine          PAST SURGICAL HISTORY:  Past Surgical History:   Procedure Laterality Date     ARTHROSCOPY KNEE RT/LT  2007    right ,degenerative changes joint and meniscus     AS EXC MALIG SKIN LESION TRUNK/ARM/LEG 0.6-1.0 CM      melanoma     BIOPSY NODE SENTINEL Right 9/14/2015    Procedure: BIOPSY NODE SENTINEL;  Surgeon: Eliezer Bates MD;  Location: [facility]     COLONOSCOPY N/A 10/14/2016    Procedure: COLONOSCOPY;  Surgeon: Eliezer Bates MD;  Location:  GI     HC KNEE SCOPE, DIAGNOSTIC  1985    right, medial meniscus injury     LUMPECTOMY BREAST WITH SEED LOCALIZATION Right 9/14/2015    Procedure: LUMPECTOMY BREAST WITH SEED LOCALIZATION;  Surgeon: Eliezer Bates MD;  Location: [facility]         SOCIAL HISTORY:  Social History     Socioeconomic History     Marital status:      Spouse name: Jonah     Number of children: 0     Years of education: 18     Highest education level: Not on file   Occupational History     Occupation:      Employer: [employer]     Comment: masters in social work   Tobacco Use     Smoking status: Never Smoker     Smokeless tobacco: Never Used   Substance and Sexual Activity     Alcohol use: Yes     Alcohol/week: 2.0 standard drinks     Types: 2 Standard drinks or equivalent per week     Comment: occasional      Drug use: No     Sexual activity: Yes     Partners: Male     Comment: same relationship since 2000, 3 partners lifelong   Other Topics Concern      Service No     Blood Transfusions No     Caffeine Concern No     Comment: 3 a week     Occupational Exposure No     Hobby Hazards No     Sleep  "Concern No     Comment: 6 hrs     Stress Concern No     Comment: low     Weight Concern No     Special Diet No     Comment: calcium/ high milk intake 2-3 glasses per day     Back Care Yes     Comment: lower back aches     Exercise Yes     Comment: running/weights 50-60 min 5-6 days per week     Bike Helmet Yes     Seat Belt Yes     Self-Exams No     Parent/sibling w/ CABG, MI or angioplasty before 65F 55M? Not Asked   Social History Narrative    Lives with .  Desires no children. Has a dog.  She works as a .     Social Determinants of Health     Financial Resource Strain:      Difficulty of Paying Living Expenses:    Food Insecurity:      Worried About Running Out of Food in the Last Year:      Ran Out of Food in the Last Year:    Transportation Needs:      Lack of Transportation (Medical):      Lack of Transportation (Non-Medical):    Physical Activity:      Days of Exercise per Week:      Minutes of Exercise per Session:    Stress:      Feeling of Stress :    Social Connections:      Frequency of Communication with Friends and Family:      Frequency of Social Gatherings with Friends and Family:      Attends Synagogue Services:      Active Member of Clubs or Organizations:      Attends Club or Organization Meetings:      Marital Status:    Intimate Partner Violence:      Fear of Current or Ex-Partner:      Emotionally Abused:      Physically Abused:      Sexually Abused:          FAMILY HISTORY:  Family History   Problem Relation Age of Onset     Cancer Father         ocular melanoma,  of metastatic diseas age 81     Hypertension Father      Lipids Father      Prostate Cancer Father         onset age 70     Gastrointestinal Disease Father         \"sensitive stomach\"     Skin Cancer Father      Gastrointestinal Disease Mother         cholecystectomy     Eye Disorder Mother         cataract, macular degeneration     Cerebrovascular Disease Mother 85     C.A.D. Maternal Grandmother       " "   of MI age 83     C.A.D. Maternal Grandfather          MI early 60's     C.A.D. Paternal Grandfather          early 50s MI     Breast Cancer Paternal Grandmother          mid 70s     Hypertension Brother          PHYSICAL EXAM:  /76   Pulse 80   Temp 98.4  F (36.9  C) (Oral)   Resp 16   Ht 1.651 m (5' 5\")   Wt 74 kg (163 lb 3.2 oz)   SpO2 97%   BMI 27.16 kg/m    ECO  GENERAL/CONSTITUTIONAL: No acute distress.  EYES: No scleral icterus.  RESPIRATORY: Clear to auscultation bilaterally. No crackles or wheezing.   CARDIOVASCULAR: Regular rate and rhythm without murmurs, gallops, or rubs.  GASTROINTESTINAL: No tenderness. The patient has normal bowel sounds. No guarding.  No distention.  BREAST: Right-s/p lumpectomy; no palpable mass, discharge, rash, or axillary lymphadenopathy.  Left-no palpable mass, discharge, rash, or axillary lymphadenopathy.   MUSCULOSKELETAL: Warm and well-perfused, no cyanosis, clubbing, or edema.  NEUROLOGIC: Alert, oriented, answers questions appropriately.  INTEGUMENTARY: No jaundice.  GAIT: Steady, does not use assistive device      LABS:  None today.      IMAGING:  Bilateral mammogram 10/16/20:  BI-RADS 2 - benign      ASSESSMENT/PLAN:  Piper Jackson is a 56 year old pre-menopausal, otherwise healthy, female:    1) Invasive ductal carcinoma of right upper inner breast: s/p lumpectomy on 9/14/15, pathology showed grade 1, 1.5 cm size tumor, negative margins, no LVI, negative lymph nodes, ER strongly positive, ME strongly positive, HER2 negative, pT1cN0, stage IA. Oncotype DX score is 12.  She completed radiation 10/19/15-12/1/15.  She started tamoxifen on 12/5/15 and stopped in 2021.    There has been no evidence of recurrence on exam or mammogram.      She has completed 5 years of tamoxifen.  We had discussed trying for 10 years treatment.  However, she continued to have side effects of hot flashes and hair thinning.  She doesn't think she can " "complete 10 years.  We previously reviewed her Oncotype score and her overall risk again.  She had a stage IA breast cancer and relatively low Oncotype score.  I feel it would be reasonable to stop the tamoxifen after completing 5 years, especially as it is impeding on her quality of life significantly.  She did decide to stop the tamoxifen in February 2021.    Mammogram on 10/16/20 was benign.    -continue Effexor ER up to 75 mg daily.  If she stops the tamoxifen, then we may also taper off of the Effexor, since we had started it for the hot flashes related to the tamoxifen.  However, she would like to continue taking the Effexor, as it has worked well for her in terms of mood and anxiety.  We will continue it.    -next bilateral screening mammogram in October 2021 - ordered  -RTC in 1 year    2) Melanoma in situ: s/p excision at left lateral lower leg  -continue follow-up with dermatology    3) Colon screening: She had screening colonoscopy 10/14/16.  It was normal.  Next colonoscopy was recommended for 10 year from then.    4) Generalized anxiety disorder: She has tapered off of Lexapro.  -she is now on Effexor, as above.  She is doing better now.        Fabiola Kate MD  Hematology/Oncology  [facility]      Total time spent on day of visit, including review of tests, obtaining/reviewing separately obtained history, ordering medications/tests/procedures, communicating with PCP/consultants, and documenting in electronic medical record: 20 minutes      Oncology Rooming Note    August 20, 2021 8:57 AM   Piper Lisa is a 56 year old female who presents for:    Chief Complaint   Patient presents with     Oncology Clinic Visit     Malignant neoplasm of upper-inner quadrant of right female breast (H)     Initial Vitals: /76   Pulse 80   Temp 98.4  F (36.9  C) (Oral)   Resp 16   Ht 1.651 m (5' 5\")   Wt 74 kg (163 lb 3.2 oz)   SpO2 97%   BMI 27.16 kg/m   Estimated body mass index " "is 27.16 kg/m  as calculated from the following:    Height as of this encounter: 1.651 m (5' 5\").    Weight as of this encounter: 74 kg (163 lb 3.2 oz). Body surface area is 1.84 meters squared.  No Pain (0) Comment: Data Unavailable   No LMP recorded.  Allergies reviewed: Yes  Medications reviewed: Yes    Medications: Medication refills not needed today.  Pharmacy name entered into [facility]:    [id number] - [place] [phone] [place] [phone] & [facility]  [place] [facility] - [place], [place] - [address]    Clinical concerns: None       Sanjuana Babb MA                  Again, thank you for allowing me to participate in the care of your patient.        Sincerely,        Fabiola Kate MD    "

## 2021-08-20 NOTE — PROGRESS NOTES
"Oncology Rooming Note    August 20, 2021 8:57 AM   Piper Lisa is a 56 year old female who presents for:    Chief Complaint   Patient presents with     Oncology Clinic Visit     Malignant neoplasm of upper-inner quadrant of right female breast (H)     Initial Vitals: /76   Pulse 80   Temp 98.4  F (36.9  C) (Oral)   Resp 16   Ht 1.651 m (5' 5\")   Wt 74 kg (163 lb 3.2 oz)   SpO2 97%   BMI 27.16 kg/m   Estimated body mass index is 27.16 kg/m  as calculated from the following:    Height as of this encounter: 1.651 m (5' 5\").    Weight as of this encounter: 74 kg (163 lb 3.2 oz). Body surface area is 1.84 meters squared.  No Pain (0) Comment: Data Unavailable   No LMP recorded.  Allergies reviewed: Yes  Medications reviewed: Yes    Medications: Medication refills not needed today.  Pharmacy name entered into [facility]:    [facility][id number] - [place] [phone] [place] [phone] & [address] includes the [facility]  [place] [facility] - [place], [place] - [address]    Clinical concerns: None       Sanjuana Babb MA              "

## 2021-09-04 ENCOUNTER — HEALTH MAINTENANCE LETTER (OUTPATIENT)
Age: 56
End: 2021-09-04

## 2021-10-21 ENCOUNTER — HOSPITAL ENCOUNTER (OUTPATIENT)
Dept: MAMMOGRAPHY | Facility: CLINIC | Age: 56
Discharge: HOME | End: 2021-10-21
Attending: INTERNAL MEDICINE | Admitting: INTERNAL MEDICINE
Payer: COMMERCIAL

## 2021-10-21 DIAGNOSIS — Z12.31 VISIT FOR SCREENING MAMMOGRAM: ICD-10-CM

## 2021-10-21 PROCEDURE — 77063 BREAST TOMOSYNTHESIS BI: CPT

## 2022-01-04 DIAGNOSIS — Z17.0 MALIGNANT NEOPLASM OF UPPER-INNER QUADRANT OF RIGHT BREAST IN FEMALE, ESTROGEN RECEPTOR POSITIVE (H): ICD-10-CM

## 2022-01-04 DIAGNOSIS — C50.211 MALIGNANT NEOPLASM OF UPPER-INNER QUADRANT OF RIGHT BREAST IN FEMALE, ESTROGEN RECEPTOR POSITIVE (H): ICD-10-CM

## 2022-01-04 DIAGNOSIS — F41.1 GENERALIZED ANXIETY DISORDER: ICD-10-CM

## 2022-01-04 RX ORDER — VENLAFAXINE HYDROCHLORIDE 75 MG/1
TABLET, EXTENDED RELEASE ORAL
Qty: 90 TABLET | Refills: 3 | Status: SHIPPED | OUTPATIENT
Start: 2022-01-04 | End: 2023-01-30

## 2022-02-19 ENCOUNTER — HEALTH MAINTENANCE LETTER (OUTPATIENT)
Age: 57
End: 2022-02-19

## 2022-08-13 NOTE — PROGRESS NOTES
[facility]    Hematology/Oncology Established Patient Follow-up Note      Today's Date: 08/29/22    Reason for Follow-up: Right breast cancer.  Transfer of care from Dr. Fabiola Kate.    HISTORY OF PRESENT ILLNESS: Piper Lisa is a 57 year old female who presents with the following oncologic history:  1.  8/18/2015: Screening mammogram showed focal asymmetry in right upper inner breast.    2.  8/25/2015: Diagnostic right mammogram showed right upper inner breast abnormality.  Right breast ultrasound showed a 1.2 cm heterogeneous focal lesion at 1:00, 5 cm from nipple.  3. 8/27/20215: Right breast biopsy showed grade 1 invasive ductal carcinoma, ER positive 95%, AK positive 80%, HER-2/lexy FISH negative.  4. 9/2/2015: Breast MRI showed right upper inner breast mass 1.3 x 1.1 x 1.9 cm; small area of mildly suspicious enhancement in upper outer right breast. No enlarged axillary lymph nodes.  5. 9/3/2015: U/S-guided right breast biopsy of 1 cm mass at 11:00, 6 cm from nipple showed benign breast tissue, no malignancy or atypia.  6. 9/09/2015: Excision of left lateral lower leg skin showed melanoma in situ overlying and adjacent to scar. No invasive melanoma.  7.  9/14/2015: Underwent right breast lumpectomy under care of Dr. Eliezer Bates.  Pathology showed a 1.5 cm grade 1 invasive ductal carcinoma, no LVI, margins negative.  Oncotype DX = 12, 10-year risk of distant recurrence of 8% after 5 years of hormone blockade therapy.  8.  10/19/2015 - 12/1/2015: Completed adjuvant radiation therapy to the right breast.  9.  12/5/2015: Started adjuvant tamoxifen (premenopausal at time of diagnosis).   10.  2/2021: Stopped tamoxifen.    INTERIM HISTORY:  Piper Lisa reports feeling well.      REVIEW OF SYSTEMS:   14 point ROS was reviewed and is negative other than as noted above in HPI.       HOME MEDICATIONS:  Current Outpatient Medications   Medication Sig Dispense Refill     Calcium  Citrate-Vitamin D (CALCIUM + D PO)        venlafaxine (EFFEXOR-ER) 75 MG 24 hr tablet TAKE 1 TABLET(75 MG) BY MOUTH DAILY 90 tablet 3         ALLERGIES:  Allergies   Allergen Reactions     Other [No Clinical Screening - See Comments] Rash     metal         PAST MEDICAL HISTORY:  Past Medical History:   Diagnosis Date     Generalized anxiety disorder 2002     Hirsutism     facial hair     Migraine          PAST SURGICAL HISTORY:  Past Surgical History:   Procedure Laterality Date     ARTHROSCOPY KNEE RT/LT  2007    right ,degenerative changes joint and meniscus     AS EXC MALIG SKIN LESION TRUNK/ARM/LEG 0.6-1.0 CM      melanoma     BIOPSY NODE SENTINEL Right 9/14/2015    Procedure: BIOPSY NODE SENTINEL;  Surgeon: Eliezer Bates MD;  Location: [facility]     COLONOSCOPY N/A 10/14/2016    Procedure: COLONOSCOPY;  Surgeon: Eliezer Bates MD;  Location:  GI      KNEE SCOPE, DIAGNOSTIC  1985    right, medial meniscus injury     LUMPECTOMY BREAST WITH SEED LOCALIZATION Right 9/14/2015    Procedure: LUMPECTOMY BREAST WITH SEED LOCALIZATION;  Surgeon: Eliezer Bates MD;  Location: [facility]         SOCIAL HISTORY:  Social History     Socioeconomic History     Marital status:      Spouse name: Jonah     Number of children: 0     Years of education: 18     Highest education level: Not on file   Occupational History     Occupation:      Employer: [employer]     Comment: masters in social work   Tobacco Use     Smoking status: Never Smoker     Smokeless tobacco: Never Used   Substance and Sexual Activity     Alcohol use: Yes     Alcohol/week: 2.0 standard drinks     Types: 2 Standard drinks or equivalent per week     Comment: occasional      Drug use: No     Sexual activity: Yes     Partners: Male     Comment: same relationship since 2000, 3 partners lifelong   Other Topics Concern      Service No     Blood Transfusions No     Caffeine Concern No     Comment: 3 a week      "Occupational Exposure No     Hobby Hazards No     Sleep Concern No     Comment: 6 hrs     Stress Concern No     Comment: low     Weight Concern No     Special Diet No     Comment: calcium/ high milk intake 2-3 glasses per day     Back Care Yes     Comment: lower back aches     Exercise Yes     Comment: running/weights 50-60 min 5-6 days per week     Bike Helmet Yes     Seat Belt Yes     Self-Exams No     Parent/sibling w/ CABG, MI or angioplasty before 65F 55M? Not Asked   Social History Narrative    Lives with .  Desires no children. Has a dog.  She works as a .     Social Determinants of Health     Financial Resource Strain: Not on file   Food Insecurity: Not on file   Transportation Needs: Not on file   Physical Activity: Not on file   Stress: Not on file   Social Connections: Not on file   Intimate Partner Violence: Not on file   Housing Stability: Not on file         FAMILY HISTORY:  Family History   Problem Relation Age of Onset     Cancer Father         ocular melanoma,  of metastatic diseas age 81     Hypertension Father      Lipids Father      Prostate Cancer Father         onset age 70     Gastrointestinal Disease Father         \"sensitive stomach\"     Skin Cancer Father      Gastrointestinal Disease Mother         cholecystectomy     Eye Disorder Mother         cataract, macular degeneration     Cerebrovascular Disease Mother 85     C.A.D. Maternal Grandmother          of MI age 83     C.A.D. Maternal Grandfather          MI early 60's     C.A.D. Paternal Grandfather          early 50s MI     Breast Cancer Paternal Grandmother          mid 70s     Hypertension Brother          PHYSICAL EXAM:  Vital signs:  /82   Pulse 70   Resp 16   Ht 1.651 m (5' 5\")   Wt 70.6 kg (155 lb 9.6 oz)   SpO2 98%   BMI 25.89 kg/m     ECO  GENERAL/CONSTITUTIONAL: No acute distress.  EYES: No scleral icterus.  ENT/MOUTH: Neck supple. Mask in place.  LYMPH: No " cervical, supraclavicular, axillary adenopathy.   BREAST: No palpable masses in either breast. Nipples are everted bilaterally with no discharge. No erythema, ulceration, or dimpling of the skin.  RESPIRATORY: Clear to auscultation bilaterally. No crackles or wheezing.   CARDIOVASCULAR: Regular rate and rhythm without murmurs, gallops, or rubs.  GASTROINTESTINAL: No hepatosplenomegaly, masses, or tenderness. No guarding.  No distention.  MUSCULOSKELETAL: Warm and well-perfused, no cyanosis, clubbing, or edema.  NEUROLOGIC: Alert, oriented, answers questions appropriately.  INTEGUMENTARY: No rashes or jaundice.  GAIT: Steady, does not use assistive device      LABS:  CBC RESULTS:   Recent Labs   Lab Test 08/28/18  1710   WBC 8.3   RBC 4.31   HGB 13.0   HCT 39.3   MCV 91   MCH 30.2   MCHC 33.1   RDW 12.2          Recent Labs   Lab Test 08/28/18  1710 08/23/17  1017    141   POTASSIUM 4.1 4.2   CHLORIDE 106 105   CO2 25 27   ANIONGAP 8 9   GLC 93 78   BUN 16 14   CR 0.99 0.97   PATRICIA 9.0 9.3         PATHOLOGY:  Reviewed as per HPI.    IMAGING:  10/21/2021: Mammogram showed no suspicious findings.    ASSESSMENT/PLAN:  Piper Lisa is a 57 year old female with the following issues:  1.  Stage IA, [id number], grade 1 invasive ductal carcinoma of the right upper inner breast, ER positive, UT positive, HER2/lexy negative, Oncotype DX = 12  - Piper is status post right breast lumpectomy 9/14/2015, received adjuvant radiation completed 12/1/2015.  Completed tamoxifen 12/5/2015-2/2021.  - She has no clinical evidence for recurrent breast cancer by physical exam from today.  -- Continue annual mammograms, next due 10/2022.    2. Anxiety  --Well controlled on venlafaxine.  Will continue on it.    3. 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  --Continue skin checks with dermatology.    Return in 1 year.    Lara Wu MD  Hematology/Oncology  [facility]    Total  time spent: 40 minutes in extensive review of oncologic records, patient evaluation, counseling, documentation, and coordination of care.

## 2022-08-29 ENCOUNTER — ONCOLOGY VISIT (OUTPATIENT)
Dept: ONCOLOGY | Facility: CLINIC | Age: 57
End: 2022-08-29
Attending: INTERNAL MEDICINE
Payer: COMMERCIAL

## 2022-08-29 VITALS
DIASTOLIC BLOOD PRESSURE: 82 MMHG | SYSTOLIC BLOOD PRESSURE: 123 MMHG | HEIGHT: 65 IN | OXYGEN SATURATION: 98 % | RESPIRATION RATE: 16 BRPM | BODY MASS INDEX: 25.92 KG/M2 | HEART RATE: 70 BPM | WEIGHT: 155.6 LBS

## 2022-08-29 DIAGNOSIS — F41.1 GENERALIZED ANXIETY DISORDER: ICD-10-CM

## 2022-08-29 DIAGNOSIS — Z12.31 ENCOUNTER FOR SCREENING MAMMOGRAM FOR BREAST CANCER: ICD-10-CM

## 2022-08-29 DIAGNOSIS — C50.211 MALIGNANT NEOPLASM OF UPPER-INNER QUADRANT OF RIGHT BREAST IN FEMALE, ESTROGEN RECEPTOR POSITIVE (H): Primary | ICD-10-CM

## 2022-08-29 DIAGNOSIS — Z17.0 MALIGNANT NEOPLASM OF UPPER-INNER QUADRANT OF RIGHT BREAST IN FEMALE, ESTROGEN RECEPTOR POSITIVE (H): Primary | ICD-10-CM

## 2022-08-29 PROCEDURE — G0463 HOSPITAL OUTPT CLINIC VISIT: HCPCS

## 2022-08-29 PROCEDURE — 99215 OFFICE O/P EST HI 40 MIN: CPT | Performed by: INTERNAL MEDICINE

## 2022-08-29 ASSESSMENT — PAIN SCALES - GENERAL: PAINLEVEL: NO PAIN (0)

## 2022-08-29 NOTE — PROGRESS NOTES
"Oncology Rooming Note    August 29, 2022 8:20 AM   Piper Lisa is a 57 year old female who presents for:    Chief Complaint   Patient presents with     Oncology Clinic Visit     Initial Vitals: There were no vitals taken for this visit. Estimated body mass index is 27.16 kg/m  as calculated from the following:    Height as of 8/20/21: 1.651 m (5' 5\").    Weight as of 8/20/21: 74 kg (163 lb 3.2 oz). There is no height or weight on file to calculate BSA.  Data Unavailable Comment: Data Unavailable   No LMP recorded.  Allergies reviewed: Yes  Medications reviewed: Yes    Medications: Medication refills not needed today.  Pharmacy name entered into [facility]:    [id number] - [facility] [address] [address] & [facility]  [facility] - [place], [place] - [address]    Clinical concerns:  doctor was notified.      Mare Duff MA            "

## 2022-08-29 NOTE — LETTER
8/29/2022         RE: Piper Lisa  [address]  [place] [address]        Dear Colleague,    Thank you for referring your patient, Piper Lisa, to the [facility]. Please see a copy of my visit note below.    [facility]    Hematology/Oncology Established Patient Follow-up Note      Today's Date: 08/29/22    Reason for Follow-up: Right breast cancer.  Transfer of care from Dr. Fabiola Kate.    HISTORY OF PRESENT ILLNESS: Piper Lisa is a 57 year old female who presents with the following oncologic history:  1.  8/18/2015: Screening mammogram showed focal asymmetry in right upper inner breast.    2.  8/25/2015: Diagnostic right mammogram showed right upper inner breast abnormality.  Right breast ultrasound showed a 1.2 cm heterogeneous focal lesion at 1:00, 5 cm from nipple.  3. 8/27/20215: Right breast biopsy showed grade 1 invasive ductal carcinoma, ER positive 95%, NC positive 80%, HER-2/lexy FISH negative.  4. 9/2/2015: Breast MRI showed right upper inner breast mass 1.3 x 1.1 x 1.9 cm; small area of mildly suspicious enhancement in upper outer right breast. No enlarged axillary lymph nodes.  5. 9/3/2015: U/S-guided right breast biopsy of 1 cm mass at 11:00, 6 cm from nipple showed benign breast tissue, no malignancy or atypia.  6. 9/09/2015: Excision of left lateral lower leg skin showed melanoma in situ overlying and adjacent to scar. No invasive melanoma.  7.  9/14/2015: Underwent right breast lumpectomy under care of Dr. Eliezer Bates.  Pathology showed a 1.5 cm grade 1 invasive ductal carcinoma, no LVI, margins negative.  Oncotype DX = 12, 10-year risk of distant recurrence of 8% after 5 years of hormone blockade therapy.  8.  10/19/2015 - 12/1/2015: Completed adjuvant radiation therapy to the right breast.  9.  12/5/2015: Started adjuvant tamoxifen (premenopausal at time of diagnosis).   10.  2/2021: Stopped tamoxifen.    INTERIM  HISTORY:  Piper Lisa reports feeling well.      REVIEW OF SYSTEMS:   14 point ROS was reviewed and is negative other than as noted above in HPI.       HOME MEDICATIONS:  Current Outpatient Medications   Medication Sig Dispense Refill     Calcium Citrate-Vitamin D (CALCIUM + D PO)        venlafaxine (EFFEXOR-ER) 75 MG 24 hr tablet TAKE 1 TABLET(75 MG) BY MOUTH DAILY 90 tablet 3         ALLERGIES:  Allergies   Allergen Reactions     Other [No Clinical Screening - See Comments] Rash     metal         PAST MEDICAL HISTORY:  Past Medical History:   Diagnosis Date     Generalized anxiety disorder 2002     Hirsutism     facial hair     Migraine          PAST SURGICAL HISTORY:  Past Surgical History:   Procedure Laterality Date     ARTHROSCOPY KNEE RT/LT  2007    right ,degenerative changes joint and meniscus     AS EXC MALIG SKIN LESION TRUNK/ARM/LEG 0.6-1.0 CM      melanoma     BIOPSY NODE SENTINEL Right 9/14/2015    Procedure: BIOPSY NODE SENTINEL;  Surgeon: Eliezer Bates MD;  Location: [facility]     COLONOSCOPY N/A 10/14/2016    Procedure: COLONOSCOPY;  Surgeon: Eliezer Bates MD;  Location:  GI      KNEE SCOPE, DIAGNOSTIC  1985    right, medial meniscus injury     LUMPECTOMY BREAST WITH SEED LOCALIZATION Right 9/14/2015    Procedure: LUMPECTOMY BREAST WITH SEED LOCALIZATION;  Surgeon: Eliezer Bates MD;  Location: [facility]         SOCIAL HISTORY:  Social History     Socioeconomic History     Marital status:      Spouse name: Jonah     Number of children: 0     Years of education: 18     Highest education level: Not on file   Occupational History     Occupation:      Employer: [employer]     Comment: masters in social work   Tobacco Use     Smoking status: Never Smoker     Smokeless tobacco: Never Used   Substance and Sexual Activity     Alcohol use: Yes     Alcohol/week: 2.0 standard drinks     Types: 2 Standard drinks or equivalent per week     Comment: occasional   "    Drug use: No     Sexual activity: Yes     Partners: Male     Comment: same relationship since , 3 partners lifelong   Other Topics Concern      Service No     Blood Transfusions No     Caffeine Concern No     Comment: 3 a week     Occupational Exposure No     Hobby Hazards No     Sleep Concern No     Comment: 6 hrs     Stress Concern No     Comment: low     Weight Concern No     Special Diet No     Comment: calcium/ high milk intake 2-3 glasses per day     Back Care Yes     Comment: lower back aches     Exercise Yes     Comment: running/weights 50-60 min 5-6 days per week     Bike Helmet Yes     Seat Belt Yes     Self-Exams No     Parent/sibling w/ CABG, MI or angioplasty before 65F 55M? Not Asked   Social History Narrative    Lives with .  Desires no children. Has a dog.  She works as a .     Social Determinants of Health     Financial Resource Strain: Not on file   Food Insecurity: Not on file   Transportation Needs: Not on file   Physical Activity: Not on file   Stress: Not on file   Social Connections: Not on file   Intimate Partner Violence: Not on file   Housing Stability: Not on file         FAMILY HISTORY:  Family History   Problem Relation Age of Onset     Cancer Father         ocular melanoma,  of metastatic diseas age 81     Hypertension Father      Lipids Father      Prostate Cancer Father         onset age 70     Gastrointestinal Disease Father         \"sensitive stomach\"     Skin Cancer Father      Gastrointestinal Disease Mother         cholecystectomy     Eye Disorder Mother         cataract, macular degeneration     Cerebrovascular Disease Mother 85     C.A.D. Maternal Grandmother          of MI age 83     C.A.D. Maternal Grandfather          MI early 60's     C.A.D. Paternal Grandfather          early 50s MI     Breast Cancer Paternal Grandmother          mid 70s     Hypertension Brother          PHYSICAL EXAM:  Vital signs:  /82  " " Pulse 70   Resp 16   Ht 1.651 m (5' 5\")   Wt 70.6 kg (155 lb 9.6 oz)   SpO2 98%   BMI 25.89 kg/m     ECO  GENERAL/CONSTITUTIONAL: No acute distress.  EYES: No scleral icterus.  ENT/MOUTH: Neck supple. Mask in place.  LYMPH: No cervical, supraclavicular, axillary adenopathy.   BREAST: No palpable masses in either breast. Nipples are everted bilaterally with no discharge. No erythema, ulceration, or dimpling of the skin.  RESPIRATORY: Clear to auscultation bilaterally. No crackles or wheezing.   CARDIOVASCULAR: Regular rate and rhythm without murmurs, gallops, or rubs.  GASTROINTESTINAL: No hepatosplenomegaly, masses, or tenderness. No guarding.  No distention.  MUSCULOSKELETAL: Warm and well-perfused, no cyanosis, clubbing, or edema.  NEUROLOGIC: Alert, oriented, answers questions appropriately.  INTEGUMENTARY: No rashes or jaundice.  GAIT: Steady, does not use assistive device      LABS:  CBC RESULTS:   Recent Labs   Lab Test 18  1710   WBC 8.3   RBC 4.31   HGB 13.0   HCT 39.3   MCV 91   MCH 30.2   MCHC 33.1   RDW 12.2          Recent Labs   Lab Test 18  1710 17  1017    141   POTASSIUM 4.1 4.2   CHLORIDE 106 105   CO2 25 27   ANIONGAP 8 9   GLC 93 78   BUN 16 14   CR 0.99 0.97   PATRICIA 9.0 9.3         PATHOLOGY:  Reviewed as per HPI.    IMAGING:  10/21/2021: Mammogram showed no suspicious findings.    ASSESSMENT/PLAN:  Piper Lisa is a 57 year old female with the following issues:  1.  Stage IA, [id number], grade 1 invasive ductal carcinoma of the right upper inner breast, ER positive, [place] positive, HER2/lexy negative, Oncotype DX = 12  - Piper is status post right breast lumpectomy 2015, received adjuvant radiation completed 2015.  Completed tamoxifen 2015-2021.  - She has no clinical evidence for recurrent breast cancer by physical exam from today.  -- Continue annual mammograms, next due 10/2022.    2. Anxiety  --Well controlled on venlafaxine.  Will " "continue on it.    3. 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  --Continue skin checks with dermatology.    Return in 1 year.    Lara Wu MD  Hematology/Oncology  [facility]    Total time spent: 40 minutes in extensive review of oncologic records, patient evaluation, counseling, documentation, and coordination of care.      Oncology Rooming Note    August 29, 2022 8:20 AM   Piper Lisa is a 57 year old female who presents for:    Chief Complaint   Patient presents with     Oncology Clinic Visit     Initial Vitals: There were no vitals taken for this visit. Estimated body mass index is 27.16 kg/m  as calculated from the following:    Height as of 8/20/21: 1.651 m (5' 5\").    Weight as of 8/20/21: 74 kg (163 lb 3.2 oz). There is no height or weight on file to calculate BSA.  Data Unavailable Comment: Data Unavailable   No LMP recorded.  Allergies reviewed: Yes  Medications reviewed: Yes    Medications: Medication refills not needed today.  Pharmacy name entered into [facility]:    [id number] - [facility] [address] [address] & [facility]  [facility] - [place], [place] - [address]    Clinical concerns:  doctor was notified.      Mare Duff MA                Again, thank you for allowing me to participate in the care of your patient.        Sincerely,        Lara Wu MD    "

## 2022-10-16 ENCOUNTER — HEALTH MAINTENANCE LETTER (OUTPATIENT)
Age: 57
End: 2022-10-16

## 2022-11-18 ENCOUNTER — ANCILLARY PROCEDURE (OUTPATIENT)
Dept: MAMMOGRAPHY | Facility: CLINIC | Age: 57
End: 2022-11-18
Attending: INTERNAL MEDICINE
Payer: COMMERCIAL

## 2022-11-18 DIAGNOSIS — C50.211 MALIGNANT NEOPLASM OF UPPER-INNER QUADRANT OF RIGHT BREAST IN FEMALE, ESTROGEN RECEPTOR POSITIVE (H): ICD-10-CM

## 2022-11-18 DIAGNOSIS — Z17.0 MALIGNANT NEOPLASM OF UPPER-INNER QUADRANT OF RIGHT BREAST IN FEMALE, ESTROGEN RECEPTOR POSITIVE (H): ICD-10-CM

## 2022-11-18 DIAGNOSIS — Z12.31 ENCOUNTER FOR SCREENING MAMMOGRAM FOR BREAST CANCER: ICD-10-CM

## 2022-11-18 PROCEDURE — 77067 SCR MAMMO BI INCL CAD: CPT | Mod: TC | Performed by: RADIOLOGY

## 2022-11-18 PROCEDURE — 77063 BREAST TOMOSYNTHESIS BI: CPT | Mod: TC | Performed by: RADIOLOGY

## 2023-01-30 DIAGNOSIS — Z17.0 MALIGNANT NEOPLASM OF UPPER-INNER QUADRANT OF RIGHT BREAST IN FEMALE, ESTROGEN RECEPTOR POSITIVE (H): ICD-10-CM

## 2023-01-30 DIAGNOSIS — F41.1 GENERALIZED ANXIETY DISORDER: ICD-10-CM

## 2023-01-30 DIAGNOSIS — C50.211 MALIGNANT NEOPLASM OF UPPER-INNER QUADRANT OF RIGHT BREAST IN FEMALE, ESTROGEN RECEPTOR POSITIVE (H): ICD-10-CM

## 2023-01-30 RX ORDER — VENLAFAXINE HYDROCHLORIDE 75 MG/1
75 TABLET, EXTENDED RELEASE ORAL DAILY
Qty: 90 TABLET | Refills: 3 | Status: SHIPPED | OUTPATIENT
Start: 2023-01-30 | End: 2024-01-17

## 2023-04-01 ENCOUNTER — HEALTH MAINTENANCE LETTER (OUTPATIENT)
Age: 58
End: 2023-04-01

## 2024-01-17 DIAGNOSIS — Z17.0 MALIGNANT NEOPLASM OF UPPER-INNER QUADRANT OF RIGHT BREAST IN FEMALE, ESTROGEN RECEPTOR POSITIVE (H): ICD-10-CM

## 2024-01-17 DIAGNOSIS — C50.211 MALIGNANT NEOPLASM OF UPPER-INNER QUADRANT OF RIGHT BREAST IN FEMALE, ESTROGEN RECEPTOR POSITIVE (H): ICD-10-CM

## 2024-01-17 DIAGNOSIS — F41.1 GENERALIZED ANXIETY DISORDER: ICD-10-CM

## 2024-01-17 RX ORDER — VENLAFAXINE HYDROCHLORIDE 75 MG/1
75 TABLET, EXTENDED RELEASE ORAL DAILY
Qty: 90 TABLET | Refills: 3 | Status: SHIPPED | OUTPATIENT
Start: 2024-01-17 | End: 2024-04-25

## 2024-03-23 ENCOUNTER — HEALTH MAINTENANCE LETTER (OUTPATIENT)
Age: 59
End: 2024-03-23

## 2024-04-02 ENCOUNTER — HOSPITAL ENCOUNTER (OUTPATIENT)
Dept: MAMMOGRAPHY | Facility: CLINIC | Age: 59
Discharge: HOME | End: 2024-04-02
Attending: FAMILY MEDICINE | Admitting: FAMILY MEDICINE
Payer: COMMERCIAL

## 2024-04-02 DIAGNOSIS — Z12.31 VISIT FOR SCREENING MAMMOGRAM: ICD-10-CM

## 2024-04-02 PROCEDURE — 77063 BREAST TOMOSYNTHESIS BI: CPT

## 2024-04-19 NOTE — PROGRESS NOTES
[facility]    Hematology/Oncology Established Patient Follow-up Note      Today's Date: 4/25/2024    Reason for Follow-up: Right breast cancer.     HISTORY OF PRESENT ILLNESS: Piper Lisa is a 59 year old female who presents with the following oncologic history:  1.  8/18/2015: Screening mammogram showed focal asymmetry in right upper inner breast.    2.  8/25/2015: Diagnostic right mammogram showed right upper inner breast abnormality.  Right breast ultrasound showed a 1.2 cm heterogeneous focal lesion at 1:00, 5 cm from nipple.  3. 8/27/20215: Right breast biopsy showed grade 1 invasive ductal carcinoma, ER positive 95%, AL positive 80%, HER-2/lexy FISH negative.  4. 9/2/2015: Breast MRI showed right upper inner breast mass 1.3 x 1.1 x 1.9 cm; small area of mildly suspicious enhancement in upper outer right breast. No enlarged axillary lymph nodes.  5. 9/3/2015: U/S-guided right breast biopsy of 1 cm mass at 11:00, 6 cm from nipple showed benign breast tissue, no malignancy or atypia.  6. 9/09/2015: Excision of left lateral lower leg skin showed melanoma in situ overlying and adjacent to scar. No invasive melanoma.  7.  9/14/2015: Underwent right breast lumpectomy under care of Dr. Eliezer Bates.  Pathology showed a 1.5 cm grade 1 invasive ductal carcinoma, no LVI, margins negative.  Oncotype DX = 12, 10-year risk of distant recurrence of 8% after 5 years of hormone blockade therapy.  8.  10/19/2015 - 12/1/2015: Completed adjuvant radiation therapy to the right breast.  9.  12/5/2015: Started adjuvant tamoxifen (premenopausal at time of diagnosis).   10.  2/2021: Stopped tamoxifen.    INTERIM HISTORY:  Piper reports feeling well.      REVIEW OF SYSTEMS:   14 point ROS was reviewed and is negative other than as noted above in HPI.       HOME MEDICATIONS:  Current Outpatient Medications   Medication Sig Dispense Refill    venlafaxine (EFFEXOR-ER) 75 MG 24 hr tablet Take 1 tablet (75 mg) by mouth  daily 90 tablet 3         ALLERGIES:  Allergies   Allergen Reactions    Other [No Clinical Screening - See Comments] Rash     metal         PAST MEDICAL HISTORY:  Past Medical History:   Diagnosis Date    Generalized anxiety disorder 2002    Hirsutism     facial hair    Migraine          PAST SURGICAL HISTORY:  Past Surgical History:   Procedure Laterality Date    ARTHROSCOPY KNEE RT/LT  2007    right ,degenerative changes joint and meniscus    AS EXC MALIG SKIN LESION TRUNK/ARM/LEG 0.6-1.0 CM      melanoma    BIOPSY NODE SENTINEL Right 9/14/2015    Procedure: BIOPSY NODE SENTINEL;  Surgeon: Eliezer Bates MD;  Location: [facility]    COLONOSCOPY N/A 10/14/2016    Procedure: COLONOSCOPY;  Surgeon: Eliezer Bates MD;  Location:  GI    HC KNEE SCOPE, DIAGNOSTIC  1985    right, medial meniscus injury    LUMPECTOMY BREAST WITH SEED LOCALIZATION Right 9/14/2015    Procedure: LUMPECTOMY BREAST WITH SEED LOCALIZATION;  Surgeon: Eliezer Bates MD;  Location: [facility]         SOCIAL HISTORY:  Social History     Socioeconomic History    Marital status:      Spouse name: Jonah    Number of children: 0    Years of education: 18    Highest education level: Not on file   Occupational History    Occupation:      Employer: [email or web address]     Comment: masters in social work   Tobacco Use    Smoking status: Never    Smokeless tobacco: Never   Substance and Sexual Activity    Alcohol use: Yes     Alcohol/week: 2.0 standard drinks of alcohol     Types: 2 Standard drinks or equivalent per week     Comment: occasional     Drug use: No    Sexual activity: Yes     Partners: Male     Comment: same relationship since 2000, 3 partners lifelong   Other Topics Concern     Service No    Blood Transfusions No    Caffeine Concern No     Comment: 3 a week    Occupational Exposure No    Hobby Hazards No    Sleep Concern No     Comment: 6 hrs    Stress Concern No     Comment: low    Weight Concern No  "   Special Diet No     Comment: calcium/ high milk intake 2-3 glasses per day    Back Care Yes     Comment: lower back aches    Exercise Yes     Comment: running/weights 50-60 min 5-6 days per week    Bike Helmet Yes    Seat Belt Yes    Self-Exams No    Parent/sibling w/ CABG, MI or angioplasty before 65F 55M? Not Asked   Social History Narrative    Lives with .  Desires no children. Has a dog.  She works as a .     Social Determinants of Health     Financial Resource Strain: Not on file   Food Insecurity: Not on file   Transportation Needs: Not on file   Physical Activity: Not on file   Stress: Not on file   Social Connections: Not on file   Interpersonal Safety: Not At Risk (2022)    Humiliation, Afraid, Rape, and Kick questionnaire     Fear of Current or Ex-Partner: No     Emotionally Abused: No     Physically Abused: No     Sexually Abused: No   Housing Stability: Not on file         FAMILY HISTORY:  Family History   Problem Relation Age of Onset    Cancer Father         ocular melanoma,  of metastatic diseas age 81    Hypertension Father     Lipids Father     Prostate Cancer Father         onset age 70    Gastrointestinal Disease Father         \"sensitive stomach\"    Skin Cancer Father     Gastrointestinal Disease Mother         cholecystectomy    Eye Disorder Mother         cataract, macular degeneration    Cerebrovascular Disease Mother 85    C.A.D. Maternal Grandmother          of MI age 83    C.A.D. Maternal Grandfather          MI early 60's    C.A.D. Paternal Grandfather          early 50s MI    Breast Cancer Paternal Grandmother          mid 70s    Hypertension Brother          PHYSICAL EXAM:  Vital signs:  /76   Pulse 98   Temp 98.4  F (36.9  C) (Oral)   Resp 16   Wt 73.2 kg (161 lb 6.4 oz)   SpO2 97%   BMI 26.86 kg/m     GENERAL/CONSTITUTIONAL: No acute distress.  EYES: No scleral icterus.  LYMPH: No cervical, supraclavicular, axillary " adenopathy.   BREAST: No palpable masses in either breast. Nipples are everted bilaterally with no discharge. No erythema, ulceration, or dimpling of the skin.  RESPIRATORY: No audible cough or wheezing.   GASTROINTESTINAL: No hepatosplenomegaly, masses, or tenderness. No guarding.  No distention.  MUSCULOSKELETAL: Warm and well-perfused, no cyanosis, clubbing, or edema.  NEUROLOGIC: Alert, oriented, answers questions appropriately.  INTEGUMENTARY: No rashes or jaundice.  GAIT: Steady, does not use assistive device      LABS:  CBC RESULTS:   Recent Labs   Lab Test 18  1710   WBC 8.3   RBC 4.31   HGB 13.0   HCT 39.3   MCV 91   MCH 30.2   MCHC 33.1   RDW 12.2          Recent Labs   Lab Test 18  1710 17  1017    141   POTASSIUM 4.1 4.2   CHLORIDE 106 105   CO2 25 27   ANIONGAP 8 9   GLC 93 78   BUN 16 14   CR 0.99 0.97   PATRICIA 9.0 9.3         PATHOLOGY:  Reviewed as per HPI.    IMAGIN2024: Mammogram showed no suspicious findings.    ASSESSMENT/PLAN:  Piper Lisa is a 59 year old female with the following issues:  1.  Stage IA, [id number], grade 1 invasive ductal carcinoma of the right upper inner breast, ER positive, NV positive, HER2/lexy negative, Oncotype DX = 12  - Piper is status post right breast lumpectomy 2015, adjuvant radiation completed 2015.  Completed tamoxifen 2015-2021.  - She has no clinical evidence for recurrent breast cancer by physical exam from today or mammogram reviewed from 2024.  -- Continue annual mammograms, next due 2025.    2. Anxiety  --Well controlled on venlafaxine.  Will continue on it. Refilled today.    3. Melanoma in situ of left lateral lower leg  --S/p excision in 2015.  --Continue skin checks with dermatology. She will make appointment.    Return in 1 year.    Lara Wu MD  [facility] Hematology/Oncology     Total time spent today: 30 minutes in chart review, patient evaluation, counseling, documentation,  test and/or medication/prescription orders, and coordination of care.

## 2024-04-25 ENCOUNTER — ONCOLOGY VISIT (OUTPATIENT)
Dept: ONCOLOGY | Facility: CLINIC | Age: 59
End: 2024-04-25
Attending: INTERNAL MEDICINE
Payer: COMMERCIAL

## 2024-04-25 VITALS
BODY MASS INDEX: 26.86 KG/M2 | SYSTOLIC BLOOD PRESSURE: 118 MMHG | OXYGEN SATURATION: 97 % | RESPIRATION RATE: 16 BRPM | WEIGHT: 161.4 LBS | TEMPERATURE: 98.4 F | DIASTOLIC BLOOD PRESSURE: 76 MMHG | HEART RATE: 98 BPM

## 2024-04-25 DIAGNOSIS — F41.1 GENERALIZED ANXIETY DISORDER: ICD-10-CM

## 2024-04-25 DIAGNOSIS — C50.211 MALIGNANT NEOPLASM OF UPPER-INNER QUADRANT OF RIGHT BREAST IN FEMALE, ESTROGEN RECEPTOR POSITIVE (H): Primary | ICD-10-CM

## 2024-04-25 DIAGNOSIS — Z17.0 MALIGNANT NEOPLASM OF UPPER-INNER QUADRANT OF RIGHT BREAST IN FEMALE, ESTROGEN RECEPTOR POSITIVE (H): Primary | ICD-10-CM

## 2024-04-25 DIAGNOSIS — Z12.31 ENCOUNTER FOR SCREENING MAMMOGRAM FOR BREAST CANCER: ICD-10-CM

## 2024-04-25 PROCEDURE — 99214 OFFICE O/P EST MOD 30 MIN: CPT | Performed by: INTERNAL MEDICINE

## 2024-04-25 RX ORDER — VENLAFAXINE HYDROCHLORIDE 75 MG/1
75 TABLET, EXTENDED RELEASE ORAL DAILY
Qty: 90 TABLET | Refills: 3 | Status: SHIPPED | OUTPATIENT
Start: 2024-04-25

## 2024-04-25 ASSESSMENT — PAIN SCALES - GENERAL: PAINLEVEL: NO PAIN (0)

## 2024-04-25 NOTE — LETTER
4/25/2024         RE: Piper Lisa  [address]  [facility] [id number]        Dear Colleague,    Thank you for referring your patient, Piper Lisa, to the [facility]. Please see a copy of my visit note below.    [facility]    Hematology/Oncology Established Patient Follow-up Note      Today's Date: 4/25/2024    Reason for Follow-up: Right breast cancer.     HISTORY OF PRESENT ILLNESS: Piper Lisa is a 59 year old female who presents with the following oncologic history:  1.  8/18/2015: Screening mammogram showed focal asymmetry in right upper inner breast.    2.  8/25/2015: Diagnostic right mammogram showed right upper inner breast abnormality.  Right breast ultrasound showed a 1.2 cm heterogeneous focal lesion at 1:00, 5 cm from nipple.  3. 8/27/20215: Right breast biopsy showed grade 1 invasive ductal carcinoma, ER positive 95%, CA positive 80%, HER-2/lexy FISH negative.  4. 9/2/2015: Breast MRI showed right upper inner breast mass 1.3 x 1.1 x 1.9 cm; small area of mildly suspicious enhancement in upper outer right breast. No enlarged axillary lymph nodes.  5. 9/3/2015: U/S-guided right breast biopsy of 1 cm mass at 11:00, 6 cm from nipple showed benign breast tissue, no malignancy or atypia.  6. 9/09/2015: Excision of left lateral lower leg skin showed melanoma in situ overlying and adjacent to scar. No invasive melanoma.  7.  9/14/2015: Underwent right breast lumpectomy under care of Dr. Eliezer Bates.  Pathology showed a 1.5 cm grade 1 invasive ductal carcinoma, no LVI, margins negative.  Oncotype DX = 12, 10-year risk of distant recurrence of 8% after 5 years of hormone blockade therapy.  8.  10/19/2015 - 12/1/2015: Completed adjuvant radiation therapy to the right breast.  9.  12/5/2015: Started adjuvant tamoxifen (premenopausal at time of diagnosis).   10. 2/2021: Stopped tamoxifen.    INTERIM HISTORY:  Piper kerr  well.      REVIEW OF SYSTEMS:   14 point ROS was reviewed and is negative other than as noted above in HPI.       HOME MEDICATIONS:  Current Outpatient Medications   Medication Sig Dispense Refill     venlafaxine (EFFEXOR-ER) 75 MG 24 hr tablet Take 1 tablet (75 mg) by mouth daily 90 tablet 3         ALLERGIES:  Allergies   Allergen Reactions     Other [No Clinical Screening - See Comments] Rash     metal         PAST MEDICAL HISTORY:  Past Medical History:   Diagnosis Date     Generalized anxiety disorder 2002     Hirsutism     facial hair     Migraine          PAST SURGICAL HISTORY:  Past Surgical History:   Procedure Laterality Date     ARTHROSCOPY KNEE RT/LT  2007    right ,degenerative changes joint and meniscus     AS EXC MALIG SKIN LESION TRUNK/ARM/LEG 0.6-1.0 CM      melanoma     BIOPSY NODE SENTINEL Right 9/14/2015    Procedure: BIOPSY NODE SENTINEL;  Surgeon: Eliezer Bates MD;  Location: [facility]     COLONOSCOPY N/A 10/14/2016    Procedure: COLONOSCOPY;  Surgeon: Eliezer Bates MD;  Location:  GI      KNEE SCOPE, DIAGNOSTIC  1985    right, medial meniscus injury     LUMPECTOMY BREAST WITH SEED LOCALIZATION Right 9/14/2015    Procedure: LUMPECTOMY BREAST WITH SEED LOCALIZATION;  Surgeon: Eliezer Bates MD;  Location: [facility]         SOCIAL HISTORY:  Social History     Socioeconomic History     Marital status:      Spouse name: Jonah     Number of children: 0     Years of education: 18     Highest education level: Not on file   Occupational History     Occupation:      Employer: [employer]     Comment: masters in social work   Tobacco Use     Smoking status: Never     Smokeless tobacco: Never   Substance and Sexual Activity     Alcohol use: Yes     Alcohol/week: 2.0 standard drinks of alcohol     Types: 2 Standard drinks or equivalent per week     Comment: occasional      Drug use: No     Sexual activity: Yes     Partners: Male     Comment: same  "relationship since , 3 partners lifelong   Other Topics Concern      Service No     Blood Transfusions No     Caffeine Concern No     Comment: 3 a week     Occupational Exposure No     Hobby Hazards No     Sleep Concern No     Comment: 6 hrs     Stress Concern No     Comment: low     Weight Concern No     Special Diet No     Comment: calcium/ high milk intake 2-3 glasses per day     Back Care Yes     Comment: lower back aches     Exercise Yes     Comment: running/weights 50-60 min 5-6 days per week     Bike Helmet Yes     Seat Belt Yes     Self-Exams No     Parent/sibling w/ CABG, MI or angioplasty before 65F 55M? Not Asked   Social History Narrative    Lives with .  Desires no children. Has a dog.  She works as a .     Social Determinants of Health     Financial Resource Strain: Not on file   Food Insecurity: Not on file   Transportation Needs: Not on file   Physical Activity: Not on file   Stress: Not on file   Social Connections: Not on file   Interpersonal Safety: Not At Risk (2022)    Humiliation, Afraid, Rape, and Kick questionnaire      Fear of Current or Ex-Partner: No      Emotionally Abused: No      Physically Abused: No      Sexually Abused: No   Housing Stability: Not on file         FAMILY HISTORY:  Family History   Problem Relation Age of Onset     Cancer Father         ocular melanoma,  of metastatic diseas age 81     Hypertension Father      Lipids Father      Prostate Cancer Father         onset age 70     Gastrointestinal Disease Father         \"sensitive stomach\"     Skin Cancer Father      Gastrointestinal Disease Mother         cholecystectomy     Eye Disorder Mother         cataract, macular degeneration     Cerebrovascular Disease Mother 85     C.A.D. Maternal Grandmother          of MI age 83     C.A.D. Maternal Grandfather          MI early 60's     C.A.D. Paternal Grandfather          early 50s MI     Breast Cancer Paternal " Grandmother          mid 70s     Hypertension Brother          PHYSICAL EXAM:  Vital signs:  /76   Pulse 98   Temp 98.4  F (36.9  C) (Oral)   Resp 16   Wt 73.2 kg (161 lb 6.4 oz)   SpO2 97%   BMI 26.86 kg/m     GENERAL/CONSTITUTIONAL: No acute distress.  EYES: No scleral icterus.  LYMPH: No cervical, supraclavicular, axillary adenopathy.   BREAST: No palpable masses in either breast. Nipples are everted bilaterally with no discharge. No erythema, ulceration, or dimpling of the skin.  RESPIRATORY: No audible cough or wheezing.   GASTROINTESTINAL: No hepatosplenomegaly, masses, or tenderness. No guarding.  No distention.  MUSCULOSKELETAL: Warm and well-perfused, no cyanosis, clubbing, or edema.  NEUROLOGIC: Alert, oriented, answers questions appropriately.  INTEGUMENTARY: No rashes or jaundice.  GAIT: Steady, does not use assistive device      LABS:  CBC RESULTS:   Recent Labs   Lab Test 18  1710   WBC 8.3   RBC 4.31   HGB 13.0   HCT 39.3   MCV 91   MCH 30.2   MCHC 33.1   RDW 12.2          Recent Labs   Lab Test 18  1710 17  1017    141   POTASSIUM 4.1 4.2   CHLORIDE 106 105   CO2 25 27   ANIONGAP 8 9   GLC 93 78   BUN 16 14   CR 0.99 0.97   PATRICIA 9.0 9.3         PATHOLOGY:  Reviewed as per HPI.    IMAGIN2024: Mammogram showed no suspicious findings.    ASSESSMENT/PLAN:  Piper Lisa is a 59 year old female with the following issues:  1.  Stage IA, [id number], grade 1 invasive ductal carcinoma of the right upper inner breast, ER positive, MN positive, HER2/lexy negative, Oncotype DX = 12  - Piper is status post right breast lumpectomy 2015, adjuvant radiation completed 2015.  Completed tamoxifen 2015-2021.  - She has no clinical evidence for recurrent breast cancer by physical exam from today or mammogram reviewed from 2024.  -- Continue annual mammograms, next due 2025.    2. Anxiety  --Well controlled on venlafaxine.  Will continue on it.  Refilled today.    3. 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  --Continue skin checks with dermatology. She will make appointment.    Return in 1 year.    Lara Wu MD  [facility] Hematology/Oncology     Total time spent today: 30 minutes in chart review, patient evaluation, counseling, documentation, test and/or medication/prescription orders, and coordination of care.       Again, thank you for allowing me to participate in the care of your patient.        Sincerely,        Lara Wu MD

## 2024-04-25 NOTE — NURSING NOTE
"Oncology Rooming Note    April 25, 2024 3:12 PM   Piper Lisa is a 59 year old female who presents for:    Chief Complaint   Patient presents with    Oncology Clinic Visit     Initial Vitals: /76   Pulse 98   Temp 98.4  F (36.9  C) (Oral)   Resp 16   Wt 73.2 kg (161 lb 6.4 oz)   SpO2 97%   BMI 26.86 kg/m   Estimated body mass index is 26.86 kg/m  as calculated from the following:    Height as of 8/29/22: 1.651 m (5' 5\").    Weight as of this encounter: 73.2 kg (161 lb 6.4 oz). Body surface area is 1.83 meters squared.  No Pain (0) Comment: Data Unavailable   No LMP recorded.  Allergies reviewed: Yes  Medications reviewed: Yes    Medications: Medication refills not needed today.  Pharmacy name entered into [facility]:    [id number] - [facility] [address] [address] & [facility]  [facility] - [place], [place] - [address]    Frailty Screening:   Is the patient here for a new oncology consult visit in cancer care? 2. No      Clinical concerns: follow up       Re Fry            "

## 2024-05-20 SDOH — HEALTH STABILITY: PHYSICAL HEALTH: ON AVERAGE, HOW MANY MINUTES DO YOU ENGAGE IN EXERCISE AT THIS LEVEL?: 30 MIN

## 2024-05-20 SDOH — HEALTH STABILITY: PHYSICAL HEALTH: ON AVERAGE, HOW MANY DAYS PER WEEK DO YOU ENGAGE IN MODERATE TO STRENUOUS EXERCISE (LIKE A BRISK WALK)?: 3 DAYS

## 2024-05-20 ASSESSMENT — SOCIAL DETERMINANTS OF HEALTH (SDOH): HOW OFTEN DO YOU GET TOGETHER WITH FRIENDS OR RELATIVES?: ONCE A WEEK

## 2024-05-21 ENCOUNTER — OFFICE VISIT (OUTPATIENT)
Dept: FAMILY MEDICINE | Facility: CLINIC | Age: 59
End: 2024-05-21
Payer: COMMERCIAL

## 2024-05-21 VITALS
SYSTOLIC BLOOD PRESSURE: 108 MMHG | BODY MASS INDEX: 27.02 KG/M2 | HEIGHT: 65 IN | WEIGHT: 162.2 LBS | RESPIRATION RATE: 16 BRPM | OXYGEN SATURATION: 98 % | HEART RATE: 86 BPM | DIASTOLIC BLOOD PRESSURE: 74 MMHG

## 2024-05-21 DIAGNOSIS — Z23 NEED FOR COVID-19 VACCINE: ICD-10-CM

## 2024-05-21 DIAGNOSIS — Z12.4 CERVICAL CANCER SCREENING: ICD-10-CM

## 2024-05-21 DIAGNOSIS — Z00.00 ANNUAL PHYSICAL EXAM: Primary | ICD-10-CM

## 2024-05-21 DIAGNOSIS — Z11.4 SCREENING FOR HIV (HUMAN IMMUNODEFICIENCY VIRUS): ICD-10-CM

## 2024-05-21 PROCEDURE — 87624 HPV HI-RISK TYP POOLED RSLT: CPT | Performed by: FAMILY MEDICINE

## 2024-05-21 PROCEDURE — 99386 PREV VISIT NEW AGE 40-64: CPT | Mod: 25 | Performed by: FAMILY MEDICINE

## 2024-05-21 PROCEDURE — G0145 SCR C/V CYTO,THINLAYER,RESCR: HCPCS | Performed by: FAMILY MEDICINE

## 2024-05-21 PROCEDURE — 90480 ADMN SARSCOV2 VAC 1/ONLY CMP: CPT | Performed by: FAMILY MEDICINE

## 2024-05-21 PROCEDURE — 91320 SARSCV2 VAC 30MCG TRS-SUC IM: CPT | Performed by: FAMILY MEDICINE

## 2024-05-21 ASSESSMENT — ANXIETY QUESTIONNAIRES
1. FEELING NERVOUS, ANXIOUS, OR ON EDGE: SEVERAL DAYS
3. WORRYING TOO MUCH ABOUT DIFFERENT THINGS: SEVERAL DAYS
5. BEING SO RESTLESS THAT IT IS HARD TO SIT STILL: NOT AT ALL
IF YOU CHECKED OFF ANY PROBLEMS ON THIS QUESTIONNAIRE, HOW DIFFICULT HAVE THESE PROBLEMS MADE IT FOR YOU TO DO YOUR WORK, TAKE CARE OF THINGS AT HOME, OR GET ALONG WITH OTHER PEOPLE: SOMEWHAT DIFFICULT
4. TROUBLE RELAXING: SEVERAL DAYS
GAD7 TOTAL SCORE: 5
7. FEELING AFRAID AS IF SOMETHING AWFUL MIGHT HAPPEN: NOT AT ALL
GAD7 TOTAL SCORE: 5
7. FEELING AFRAID AS IF SOMETHING AWFUL MIGHT HAPPEN: NOT AT ALL
8. IF YOU CHECKED OFF ANY PROBLEMS, HOW DIFFICULT HAVE THESE MADE IT FOR YOU TO DO YOUR WORK, TAKE CARE OF THINGS AT HOME, OR GET ALONG WITH OTHER PEOPLE?: SOMEWHAT DIFFICULT
6. BECOMING EASILY ANNOYED OR IRRITABLE: SEVERAL DAYS
2. NOT BEING ABLE TO STOP OR CONTROL WORRYING: SEVERAL DAYS

## 2024-05-21 ASSESSMENT — PAIN SCALES - GENERAL: PAINLEVEL: NO PAIN (0)

## 2024-05-21 NOTE — PROGRESS NOTES
"Preventive Care Visit  [facility] ANNABEL Serna MD, Family Medicine  May 21, 2024      Assessment & Plan     Annual physical exam    - Lipid panel reflex to direct LDL Fasting; Future  - CBC with platelets; Future  - Comprehensive metabolic panel; Future    Screening for HIV (human immunodeficiency virus)    - HIV Antigen Antibody Combo; Future    Cervical cancer screening    - Pap Screen with HPV - Recommended Age 30 - 65 Years  - Gynecologic Cytology (PAP)    Need for COVID-19 vaccine    - COVID-19 12+ (2023-24) (PFIZER)            BMI  Estimated body mass index is 26.99 kg/m  as calculated from the following:    Height as of this encounter: 1.651 m (5' 5\").    Weight as of this encounter: 73.6 kg (162 lb 3.2 oz).   Weight management plan: Discussed healthy diet and exercise guidelines    Counseling  Appropriate preventive services were discussed with this patient        Eva Saldaña is a 59 year old, presenting for the following:  Physical (Not fasting )        5/21/2024     1:17 PM   Additional Questions   Roomed by Daniel COOK        HPI              5/20/2024   General Health   How would you rate your overall physical health? Excellent   Feel stress (tense, anxious, or unable to sleep) To some extent   (!) STRESS CONCERN      5/20/2024   Nutrition   Three or more servings of calcium each day? (!) NO   Diet: Regular (no restrictions)   How many servings of fruit and vegetables per day? (!) 2-3   How many sweetened beverages each day? 0-1         5/20/2024   Exercise   Days per week of moderate/strenous exercise 3 days   Average minutes spent exercising at this level 30 min         5/20/2024   Social Factors   Frequency of gathering with friends or relatives Once a week   Worry food won't last until get money to buy more No   Food not last or not have enough money for food? No   Do you have housing?  Yes   Are you worried about losing your housing? No   Lack of transportation? No "   Unable to get utilities (heat,electricity)? No         5/20/2024   Fall Risk   Fallen 2 or more times in the past year? No   Trouble with walking or balance? No          5/20/2024   Dental   Dentist two times every year? (!) NO         5/20/2024   TB Screening   Were you born outside of the [place]? No         Today's PHQ-2 Score:       5/20/2024     2:35 PM   PHQ-2 ( 1999 Pf[employer])   Q1: Little interest or pleasure in doing things 1   Q2: Feeling down, depressed or hopeless 0   PHQ-2 Score 1   Q1: Little interest or pleasure in doing things Several days   Q2: Feeling down, depressed or hopeless Not at all   PHQ-2 Score 1           5/20/2024   Substance Use   Alcohol more than 3/day or more than 7/wk No   Do you use any other substances recreationally? No     Social History     Tobacco Use    Smoking status: Never     Passive exposure: Never    Smokeless tobacco: Never   Vaping Use    Vaping status: Never Used   Substance Use Topics    Alcohol use: Yes     Alcohol/week: 2.0 standard drinks of alcohol     Types: 2 Standard drinks or equivalent per week     Comment: occasional     Drug use: No           4/2/2024   LAST FHS-7 RESULTS   1st degree relative breast or ovarian cancer No   Any relative bilateral breast cancer No   Any male have breast cancer No   Any ONE woman have BOTH breast AND ovarian cancer No   Any woman with breast cancer before 50yrs No   2 or more relatives with breast AND/OR ovarian cancer No   2 or more relatives with breast AND/OR bowel cancer No        Mammogram Screening - Mammogram every 1-2 years updated in Health Maintenance based on mutual decision making          5/20/2024   One time HIV Screening   Previous HIV test? No         5/20/2024   STI Screening   New sexual partner(s) since last STI/HIV test? No     History of abnormal Pap smear: No - age 30- 64 PAP with HPV every 5 years recommended        Latest Ref Rng & Units 5/21/2024     2:02 PM 8/23/2017    11:53 AM 8/23/2017    10:02 AM   PAP  / HPV   PAP  Negative for Intraepithelial Lesion or Malignancy (NILM)      PAP (Historical)    NIL    HPV 16 DNA Negative Negative  Negative     HPV 18 DNA Negative Negative  Negative     Other HR HPV Negative Negative  Negative       ASCVD Risk   The 10-year ASCVD risk score (Giovanni LEVINE, et al., 2019) is: 2.1%    Values used to calculate the score:      Age: 59 years      Sex: Female      Is Non- : No      Diabetic: No      Tobacco smoker: No      Systolic Blood Pressure: 108 mmHg      Is BP treated: No      HDL Cholesterol: 69 mg/dL      Total Cholesterol: 238 mg/dL           Reviewed and updated as needed this visit by Provider    Allergies                 Patient Active Problem List   Diagnosis    Hirsutism    Generalized anxiety disorder    Migraine    Lentigo maligna (H)    Malignant neoplasm of upper-inner quadrant of right female breast (H)    History of melanoma in situ     Past Surgical History:   Procedure Laterality Date    ARTHROSCOPY KNEE RT/LT  01/01/2007    right ,degenerative changes joint and meniscus    AS EXC MALIG SKIN LESION TRUNK/ARM/LEG 0.6-1.0 CM      melanoma    BIOPSY NODE SENTINEL Right 09/14/2015    Procedure: BIOPSY NODE SENTINEL;  Surgeon: Eliezer Bates MD;  Location: [facility]    BREAST SURGERY      COLONOSCOPY N/A 10/14/2016    Procedure: COLONOSCOPY;  Surgeon: Eliezer Bates MD;  Location: [facility] KNEE SCOPE, DIAGNOSTIC  01/01/1985    right, medial meniscus injury    LUMPECTOMY BREAST WITH SEED LOCALIZATION Right 09/14/2015    Procedure: LUMPECTOMY BREAST WITH SEED LOCALIZATION;  Surgeon: Eliezer Bates MD;  Location: [facility]       Social History     Tobacco Use    Smoking status: Never     Passive exposure: Never    Smokeless tobacco: Never   Substance Use Topics    Alcohol use: Yes     Alcohol/week: 2.0 standard drinks of alcohol     Types: 2 Standard drinks or equivalent per week     Comment: occasional      Family History  "  Problem Relation Age of Onset    Cancer Father         ocular melanoma,  of metastatic diseas age 81    Hypertension Father     Lipids Father     Prostate Cancer Father         onset age 70    Gastrointestinal Disease Father         \"sensitive stomach\"    Skin Cancer Father     Gastrointestinal Disease Mother         cholecystectomy    Eye Disorder Mother         cataract, macular degeneration    Cerebrovascular Disease Mother 85    C.A.D. Maternal Grandmother          of MI age 83    C.A.D. Maternal Grandfather          MI early 60's    C.A.D. Paternal Grandfather          early 50s MI    Breast Cancer Paternal Grandmother          mid 70s    Hypertension Brother          Current Outpatient Medications   Medication Sig Dispense Refill    venlafaxine (EFFEXOR-ER) 75 MG 24 hr tablet Take 1 tablet (75 mg) by mouth daily 90 tablet 3     Allergies   Allergen Reactions    Other [No Clinical Screening - See Comments] Rash     metal         Review of Systems  CONSTITUTIONAL: NEGATIVE for fever, chills, change in weight  INTEGUMENTARY/SKIN: NEGATIVE for worrisome rashes, moles or lesions  EYES: NEGATIVE for vision changes or irritation  ENT/MOUTH: NEGATIVE for ear, mouth and throat problems  RESP: NEGATIVE for significant cough or SOB  BREAST: NEGATIVE for masses, tenderness or discharge  CV: NEGATIVE for chest pain, palpitations or peripheral edema  GI: NEGATIVE for nausea, abdominal pain, heartburn, or change in bowel habits  : NEGATIVE for frequency, dysuria, or hematuria  MUSCULOSKELETAL: NEGATIVE for significant arthralgias or myalgia  NEURO: NEGATIVE for weakness, dizziness or paresthesias  ENDOCRINE: NEGATIVE for temperature intolerance, skin/hair changes  HEME: NEGATIVE for bleeding problems  PSYCHIATRIC: NEGATIVE for changes in mood or affect     Objective    Exam  /74   Pulse 86   Resp 16   Ht 1.651 m (5' 5\")   Wt 73.6 kg (162 lb 3.2 oz)   LMP 10/15/2018 (Approximate)   SpO2 98% " "  BMI 26.99 kg/m     Estimated body mass index is 26.99 kg/m  as calculated from the following:    Height as of this encounter: 1.651 m (5' 5\").    Weight as of this encounter: 73.6 kg (162 lb 3.2 oz).    Physical Exam  GENERAL: alert and no distress  EYES: Eyes grossly normal to inspection, PERRL and conjunctivae and sclerae normal  HENT: ear canals and TM's normal, nose and mouth without ulcers or lesions  NECK: no adenopathy, no asymmetry, masses, or scars  RESP: lungs clear to auscultation - no rales, rhonchi or wheezes  CV: regular rate and rhythm, normal S1 S2, no S3 or S4, no murmur, click or rub, no peripheral edema  ABDOMEN: soft, nontender, no hepatosplenomegaly, no masses and bowel sounds normal  MS: no gross musculoskeletal defects noted, no edema  SKIN: no suspicious lesions or rashes  NEURO: Normal strength and tone, mentation intact and speech normal  PSYCH: mentation appears normal, affect normal/bright        Signed Electronically by: Maribel Serna MD    Answers submitted by the patient for this visit:  DAVID-7 (Submitted on 5/21/2024)  DAVID 7 TOTAL SCORE: 5    "

## 2024-05-22 LAB
HPV HR 12 DNA CVX QL NAA+PROBE: NEGATIVE
HPV16 DNA CVX QL NAA+PROBE: NEGATIVE
HPV18 DNA CVX QL NAA+PROBE: NEGATIVE
HUMAN PAPILLOMA VIRUS FINAL DIAGNOSIS: NORMAL

## 2024-05-24 LAB
BKR LAB AP GYN ADEQUACY: NORMAL
BKR LAB AP GYN INTERPRETATION: NORMAL
BKR LAB AP PREVIOUS ABNORMAL: NORMAL
PATH REPORT.COMMENTS IMP SPEC: NORMAL
PATH REPORT.COMMENTS IMP SPEC: NORMAL
PATH REPORT.RELEVANT HX SPEC: NORMAL

## 2024-06-01 ENCOUNTER — HEALTH MAINTENANCE LETTER (OUTPATIENT)
Age: 59
End: 2024-06-01

## 2024-06-10 ENCOUNTER — LAB (OUTPATIENT)
Dept: LAB | Facility: CLINIC | Age: 59
End: 2024-06-10
Payer: COMMERCIAL

## 2024-06-10 DIAGNOSIS — Z00.00 ANNUAL PHYSICAL EXAM: ICD-10-CM

## 2024-06-10 DIAGNOSIS — Z11.4 SCREENING FOR HIV (HUMAN IMMUNODEFICIENCY VIRUS): ICD-10-CM

## 2024-06-10 LAB
ALBUMIN SERPL BCG-MCNC: 4.6 G/DL (ref 3.5–5.2)
ALP SERPL-CCNC: 77 U/L (ref 40–150)
ALT SERPL W P-5'-P-CCNC: 9 U/L (ref 0–50)
ANION GAP SERPL CALCULATED.3IONS-SCNC: 13 MMOL/L (ref 7–15)
AST SERPL W P-5'-P-CCNC: 24 U/L (ref 0–45)
BILIRUB SERPL-MCNC: 0.5 MG/DL
BUN SERPL-MCNC: 24 MG/DL (ref 8–23)
CALCIUM SERPL-MCNC: 9.6 MG/DL (ref 8.6–10)
CHLORIDE SERPL-SCNC: 103 MMOL/L (ref 98–107)
CHOLEST SERPL-MCNC: 238 MG/DL
CREAT SERPL-MCNC: 1.19 MG/DL (ref 0.51–0.95)
DEPRECATED HCO3 PLAS-SCNC: 21 MMOL/L (ref 22–29)
EGFRCR SERPLBLD CKD-EPI 2021: 52 ML/MIN/1.73M2
ERYTHROCYTE [DISTWIDTH] IN BLOOD BY AUTOMATED COUNT: 11.9 % (ref 10–15)
FASTING STATUS PATIENT QL REPORTED: YES
FASTING STATUS PATIENT QL REPORTED: YES
GLUCOSE SERPL-MCNC: 88 MG/DL (ref 70–99)
HCT VFR BLD AUTO: 40.6 % (ref 35–47)
HDLC SERPL-MCNC: 69 MG/DL
HGB BLD-MCNC: 13.7 G/DL (ref 11.7–15.7)
HIV 1+2 AB+HIV1 P24 AG SERPL QL IA: NONREACTIVE
LDLC SERPL CALC-MCNC: 148 MG/DL
MCH RBC QN AUTO: 31 PG (ref 26.5–33)
MCHC RBC AUTO-ENTMCNC: 33.7 G/DL (ref 31.5–36.5)
MCV RBC AUTO: 92 FL (ref 78–100)
NONHDLC SERPL-MCNC: 169 MG/DL
PLATELET # BLD AUTO: 245 10E3/UL (ref 150–450)
POTASSIUM SERPL-SCNC: 4.2 MMOL/L (ref 3.4–5.3)
PROT SERPL-MCNC: 7.5 G/DL (ref 6.4–8.3)
RBC # BLD AUTO: 4.42 10E6/UL (ref 3.8–5.2)
SODIUM SERPL-SCNC: 137 MMOL/L (ref 135–145)
TRIGL SERPL-MCNC: 107 MG/DL
WBC # BLD AUTO: 5.4 10E3/UL (ref 4–11)

## 2024-06-10 PROCEDURE — 87389 HIV-1 AG W/HIV-1&-2 AB AG IA: CPT

## 2024-06-10 PROCEDURE — 80053 COMPREHEN METABOLIC PANEL: CPT

## 2024-06-10 PROCEDURE — 80061 LIPID PANEL: CPT

## 2024-06-10 PROCEDURE — 36415 COLL VENOUS BLD VENIPUNCTURE: CPT

## 2024-06-10 PROCEDURE — 85027 COMPLETE CBC AUTOMATED: CPT

## 2025-03-03 ENCOUNTER — PATIENT OUTREACH (OUTPATIENT)
Dept: CARE COORDINATION | Facility: CLINIC | Age: 60
End: 2025-03-03
Payer: COMMERCIAL

## 2025-03-31 ENCOUNTER — PATIENT OUTREACH (OUTPATIENT)
Dept: CARE COORDINATION | Facility: CLINIC | Age: 60
End: 2025-03-31
Payer: COMMERCIAL

## 2025-04-21 ENCOUNTER — PATIENT OUTREACH (OUTPATIENT)
Dept: CARE COORDINATION | Facility: CLINIC | Age: 60
End: 2025-04-21
Payer: COMMERCIAL

## 2025-04-25 ENCOUNTER — HOSPITAL ENCOUNTER (OUTPATIENT)
Dept: MAMMOGRAPHY | Facility: CLINIC | Age: 60
Discharge: HOME | End: 2025-04-25
Attending: INTERNAL MEDICINE | Admitting: INTERNAL MEDICINE
Payer: COMMERCIAL

## 2025-04-25 DIAGNOSIS — C50.211 MALIGNANT NEOPLASM OF UPPER-INNER QUADRANT OF RIGHT BREAST IN FEMALE, ESTROGEN RECEPTOR POSITIVE (H): ICD-10-CM

## 2025-04-25 DIAGNOSIS — Z12.31 ENCOUNTER FOR SCREENING MAMMOGRAM FOR BREAST CANCER: ICD-10-CM

## 2025-04-25 DIAGNOSIS — Z17.0 MALIGNANT NEOPLASM OF UPPER-INNER QUADRANT OF RIGHT BREAST IN FEMALE, ESTROGEN RECEPTOR POSITIVE (H): ICD-10-CM

## 2025-04-25 PROCEDURE — 77067 SCR MAMMO BI INCL CAD: CPT

## 2025-05-05 ENCOUNTER — PATIENT OUTREACH (OUTPATIENT)
Dept: CARE COORDINATION | Facility: CLINIC | Age: 60
End: 2025-05-05
Payer: COMMERCIAL

## 2025-05-12 DIAGNOSIS — F41.1 GENERALIZED ANXIETY DISORDER: ICD-10-CM

## 2025-05-12 DIAGNOSIS — Z17.0 MALIGNANT NEOPLASM OF UPPER-INNER QUADRANT OF RIGHT BREAST IN FEMALE, ESTROGEN RECEPTOR POSITIVE (H): ICD-10-CM

## 2025-05-12 DIAGNOSIS — C50.211 MALIGNANT NEOPLASM OF UPPER-INNER QUADRANT OF RIGHT BREAST IN FEMALE, ESTROGEN RECEPTOR POSITIVE (H): ICD-10-CM

## 2025-05-12 RX ORDER — VENLAFAXINE HYDROCHLORIDE 75 MG/1
75 TABLET, EXTENDED RELEASE ORAL DAILY
Qty: 90 TABLET | Refills: 3 | Status: SHIPPED | OUTPATIENT
Start: 2025-05-12

## 2025-07-16 SDOH — HEALTH STABILITY: PHYSICAL HEALTH: ON AVERAGE, HOW MANY MINUTES DO YOU ENGAGE IN EXERCISE AT THIS LEVEL?: 60 MIN

## 2025-07-16 SDOH — HEALTH STABILITY: PHYSICAL HEALTH: ON AVERAGE, HOW MANY DAYS PER WEEK DO YOU ENGAGE IN MODERATE TO STRENUOUS EXERCISE (LIKE A BRISK WALK)?: 3 DAYS

## 2025-07-16 ASSESSMENT — SOCIAL DETERMINANTS OF HEALTH (SDOH): HOW OFTEN DO YOU GET TOGETHER WITH FRIENDS OR RELATIVES?: ONCE A WEEK

## 2025-07-21 ENCOUNTER — OFFICE VISIT (OUTPATIENT)
Dept: FAMILY MEDICINE | Facility: CLINIC | Age: 60
End: 2025-07-21
Payer: COMMERCIAL

## 2025-07-21 VITALS
TEMPERATURE: 97.1 F | HEART RATE: 73 BPM | DIASTOLIC BLOOD PRESSURE: 72 MMHG | OXYGEN SATURATION: 98 % | WEIGHT: 159 LBS | HEIGHT: 65 IN | BODY MASS INDEX: 26.49 KG/M2 | SYSTOLIC BLOOD PRESSURE: 112 MMHG

## 2025-07-21 DIAGNOSIS — N95.1 MENOPAUSAL SYNDROME (HOT FLASHES): ICD-10-CM

## 2025-07-21 DIAGNOSIS — Z17.0 MALIGNANT NEOPLASM OF UPPER-INNER QUADRANT OF RIGHT BREAST IN FEMALE, ESTROGEN RECEPTOR POSITIVE (H): ICD-10-CM

## 2025-07-21 DIAGNOSIS — N18.30 STAGE 3 CHRONIC KIDNEY DISEASE, UNSPECIFIED WHETHER STAGE 3A OR 3B CKD (H): ICD-10-CM

## 2025-07-21 DIAGNOSIS — C50.211 MALIGNANT NEOPLASM OF UPPER-INNER QUADRANT OF RIGHT BREAST IN FEMALE, ESTROGEN RECEPTOR POSITIVE (H): ICD-10-CM

## 2025-07-21 DIAGNOSIS — F41.1 GENERALIZED ANXIETY DISORDER: ICD-10-CM

## 2025-07-21 DIAGNOSIS — Z00.00 ANNUAL PHYSICAL EXAM: Primary | ICD-10-CM

## 2025-07-21 LAB
HGB BLD-MCNC: 13.6 G/DL (ref 11.7–15.7)
MCV RBC AUTO: 91 FL (ref 78–100)

## 2025-07-21 PROCEDURE — 84443 ASSAY THYROID STIM HORMONE: CPT | Performed by: FAMILY MEDICINE

## 2025-07-21 PROCEDURE — G2211 COMPLEX E/M VISIT ADD ON: HCPCS | Performed by: FAMILY MEDICINE

## 2025-07-21 PROCEDURE — 36415 COLL VENOUS BLD VENIPUNCTURE: CPT | Performed by: FAMILY MEDICINE

## 2025-07-21 PROCEDURE — 99396 PREV VISIT EST AGE 40-64: CPT | Performed by: FAMILY MEDICINE

## 2025-07-21 PROCEDURE — 85018 HEMOGLOBIN: CPT | Performed by: FAMILY MEDICINE

## 2025-07-21 PROCEDURE — 80061 LIPID PANEL: CPT | Performed by: FAMILY MEDICINE

## 2025-07-21 PROCEDURE — 1126F AMNT PAIN NOTED NONE PRSNT: CPT | Performed by: FAMILY MEDICINE

## 2025-07-21 PROCEDURE — 82570 ASSAY OF URINE CREATININE: CPT | Performed by: FAMILY MEDICINE

## 2025-07-21 PROCEDURE — 80053 COMPREHEN METABOLIC PANEL: CPT | Performed by: FAMILY MEDICINE

## 2025-07-21 PROCEDURE — 3078F DIAST BP <80 MM HG: CPT | Performed by: FAMILY MEDICINE

## 2025-07-21 PROCEDURE — 99214 OFFICE O/P EST MOD 30 MIN: CPT | Mod: 25 | Performed by: FAMILY MEDICINE

## 2025-07-21 PROCEDURE — 3074F SYST BP LT 130 MM HG: CPT | Performed by: FAMILY MEDICINE

## 2025-07-21 PROCEDURE — 82043 UR ALBUMIN QUANTITATIVE: CPT | Performed by: FAMILY MEDICINE

## 2025-07-21 RX ORDER — VENLAFAXINE HYDROCHLORIDE 37.5 MG/1
112.5 TABLET, EXTENDED RELEASE ORAL DAILY
Qty: 90 TABLET | Refills: 4 | Status: SHIPPED | OUTPATIENT
Start: 2025-07-21

## 2025-07-21 ASSESSMENT — ANXIETY QUESTIONNAIRES
1. FEELING NERVOUS, ANXIOUS, OR ON EDGE: SEVERAL DAYS
GAD7 TOTAL SCORE: 4
6. BECOMING EASILY ANNOYED OR IRRITABLE: NOT AT ALL
8. IF YOU CHECKED OFF ANY PROBLEMS, HOW DIFFICULT HAVE THESE MADE IT FOR YOU TO DO YOUR WORK, TAKE CARE OF THINGS AT HOME, OR GET ALONG WITH OTHER PEOPLE?: SOMEWHAT DIFFICULT
3. WORRYING TOO MUCH ABOUT DIFFERENT THINGS: SEVERAL DAYS
2. NOT BEING ABLE TO STOP OR CONTROL WORRYING: SEVERAL DAYS
7. FEELING AFRAID AS IF SOMETHING AWFUL MIGHT HAPPEN: NOT AT ALL
GAD7 TOTAL SCORE: 4
GAD7 TOTAL SCORE: 4
4. TROUBLE RELAXING: SEVERAL DAYS
7. FEELING AFRAID AS IF SOMETHING AWFUL MIGHT HAPPEN: NOT AT ALL
IF YOU CHECKED OFF ANY PROBLEMS ON THIS QUESTIONNAIRE, HOW DIFFICULT HAVE THESE PROBLEMS MADE IT FOR YOU TO DO YOUR WORK, TAKE CARE OF THINGS AT HOME, OR GET ALONG WITH OTHER PEOPLE: SOMEWHAT DIFFICULT
5. BEING SO RESTLESS THAT IT IS HARD TO SIT STILL: NOT AT ALL

## 2025-07-21 ASSESSMENT — PAIN SCALES - GENERAL: PAINLEVEL_OUTOF10: NO PAIN (0)

## 2025-07-21 ASSESSMENT — PATIENT HEALTH QUESTIONNAIRE - PHQ9: SUM OF ALL RESPONSES TO PHQ QUESTIONS 1-9: 5

## 2025-07-21 NOTE — PROGRESS NOTES
"Preventive Care Visit  [facility] ANNABEL Serna MD, Family Medicine  Jul 21, 2025      Assessment & Plan     Annual physical exam  Fasting labs ordered  - Lipid panel reflex to direct LDL Fasting; Future  - Lipid panel reflex to direct LDL Fasting    Menopausal syndrome (hot flashes)  Recommending to increase the dose of venlafaxine from 75 mg to 112.5 mg daily.  Hopefully that will help improve the hot flashes  - TSH with free T4 reflex; Future  - venlafaxine (EFFEXOR-ER) 37.5 MG 24 hr tablet; Take 3 tablets (112.5 mg) by mouth daily.  - TSH with free T4 reflex    Stage 3 chronic kidney disease, unspecified whether stage 3a or 3b CKD (H)  Patient has never followed up since last year.  Recommending to stay hydrated.  Avoid NSAIDs.  Repeat labs ordered  - Comprehensive metabolic panel; Future  - Albumin Random Urine Quantitative with Creat Ratio; Future  - Hemoglobin; Future  - Comprehensive metabolic panel  - Albumin Random Urine Quantitative with Creat Ratio  - Hemoglobin    Malignant neoplasm of upper-inner quadrant of right breast in female, estrogen receptor positive (H)  Management per oncology team    Generalized anxiety disorder  Symptoms are well-controlled but could be better with the higher dose of venlafaxine therefore I have changed the dose.  Follow-up in the next 2 to 3 months for recheck  - venlafaxine (EFFEXOR-ER) 37.5 MG 24 hr tablet; Take 3 tablets (112.5 mg) by mouth daily.    The longitudinal plan of care for the diagnosis(es)/condition(s) as documented were addressed during this visit. Due to the added complexity in care, I will continue to support Piper in the subsequent management and with ongoing continuity of care.    BMI  Estimated body mass index is 26.62 kg/m  as calculated from the following:    Height as of this encounter: 1.646 m (5' 4.8\").    Weight as of this encounter: 72.1 kg (159 lb).   Weight management plan: Discussed healthy diet and exercise " guidelines      Eva Saldaña is a 60 year old, presenting for the following:  Physical        7/21/2025     2:15 PM   Additional Questions   Roomed by Familia LOPEZ       Patient complains of hot flashes.  She gets them every day.  Sometimes during the day and sometimes during the evening.  She is currently on venlafaxine for mood disorder.        8/28/2018     5:30 PM 2/12/2019    11:59 AM 7/21/2025     2:00 PM   PHQ   PHQ-9 Total Score 3  12  5   Q9: Thoughts of better off dead/self-harm past 2 weeks Not at all  Not at all  Not at all       Data saved with a previous flowsheet row definition          2/12/2019    11:59 AM 5/21/2024     1:02 PM 7/21/2025     2:08 PM   DAVID-7 SCORE   Total Score  5 (mild anxiety) 4 (minimal anxiety)   Total Score 9 5 4        Patient-reported     At night it is hard to fall asleep as she keeps thinking about a lot of things.  Cannot shut her brain off.    Advance Care Planning            7/16/2025   General Health   How would you rate your overall physical health? Excellent   Feel stress (tense, anxious, or unable to sleep) Rather much   (!) STRESS CONCERN      7/16/2025   Nutrition   Three or more servings of calcium each day? (!) NO   Diet: Regular (no restrictions)   How many servings of fruit and vegetables per day? (!) 2-3   How many sweetened beverages each day? 0-1         7/16/2025   Exercise   Days per week of moderate/strenous exercise 3 days   Average minutes spent exercising at this level 60 min         7/16/2025   Social Factors   Frequency of gathering with friends or relatives Once a week   Worry food won't last until get money to buy more No   Food not last or not have enough money for food? No   Do you have housing? (Housing is defined as stable permanent housing and does not include staying outside in a car, in a tent, in an abandoned building, in an overnight shelter, or couch-surfing.) Yes   Are you worried about losing your housing? No   Lack of  transportation? No   Unable to get utilities (heat,electricity)? No         7/16/2025   Fall Risk   Fallen 2 or more times in the past year? No   Trouble with walking or balance? No          7/16/2025   Dental   Dentist two times every year? Yes         Today's PHQ-2 Score:       7/21/2025     2:08 PM   PHQ-2 ( 1999 Pfizer)   Q1: Little interest or pleasure in doing things 1   Q2: Feeling down, depressed or hopeless 1   PHQ-2 Score 2    Q1: Little interest or pleasure in doing things Several days   Q2: Feeling down, depressed or hopeless Several days   PHQ-2 Score 2       Patient-reported           7/16/2025   Substance Use   Alcohol more than 3/day or more than 7/wk No   Do you use any other substances recreationally? No     Social History     Tobacco Use    Smoking status: Never     Passive exposure: Never    Smokeless tobacco: Never   Vaping Use    Vaping status: Never Used   Substance Use Topics    Alcohol use: Yes     Alcohol/week: 2.0 standard drinks of alcohol     Types: 2 Standard drinks or equivalent per week     Comment: occasional     Drug use: No           4/25/2025   LAST FHS-7 RESULTS   1st degree relative breast or ovarian cancer No   Any relative bilateral breast cancer No   Any male have breast cancer No   Any ONE woman have BOTH breast AND ovarian cancer No   Any woman with breast cancer before 50yrs No   2 or more relatives with breast AND/OR ovarian cancer No   2 or more relatives with breast AND/OR bowel cancer No        Mammogram Screening - Mammogram every 1-2 years updated in Health Maintenance based on mutual decision making        7/16/2025   STI Screening   New sexual partner(s) since last STI/HIV test? No     History of abnormal Pap smear:         Latest Ref Rng & Units 5/21/2024     2:02 PM 8/23/2017    11:53 AM 8/23/2017    10:02 AM   PAP / HPV   PAP  Negative for Intraepithelial Lesion or Malignancy (NILM)      PAP (Historical)    NIL    HPV 16 DNA Negative Negative  Negative     HPV  18 DNA Negative Negative  Negative     Other HR HPV Negative Negative  Negative       ASCVD Risk   The 10-year ASCVD risk score (Giovanni LEVINE, et al., 2019) is: 2.5%    Values used to calculate the score:      Age: 60 years      Sex: Female      Is Non- : No      Diabetic: No      Tobacco smoker: No      Systolic Blood Pressure: 112 mmHg      Is BP treated: No      HDL Cholesterol: 69 mg/dL      Total Cholesterol: 238 mg/dL           Reviewed and updated as needed this visit by Provider    Allergies                 Patient Active Problem List   Diagnosis    Hirsutism    Generalized anxiety disorder    Migraine    Lentigo maligna (H)    Malignant neoplasm of upper-inner quadrant of right female breast (H)    History of melanoma in situ     Past Surgical History:   Procedure Laterality Date    ARTHROSCOPY KNEE RT/LT  2007    right ,degenerative changes joint and meniscus    AS EXC MALIG SKIN LESION TRUNK/ARM/LEG 0.6-1.0 CM      melanoma    BIOPSY NODE SENTINEL Right 2015    Procedure: BIOPSY NODE SENTINEL;  Surgeon: Eliezer Bates MD;  Location: [facility]    BREAST SURGERY      COLONOSCOPY N/A 10/14/2016    Procedure: COLONOSCOPY;  Surgeon: Eliezer Bates MD;  Location: [facility] KNEE SCOPE, DIAGNOSTIC  1985    right, medial meniscus injury    LUMPECTOMY BREAST WITH SEED LOCALIZATION Right 2015    Procedure: LUMPECTOMY BREAST WITH SEED LOCALIZATION;  Surgeon: Eliezer Bates MD;  Location: [facility]       Social History     Tobacco Use    Smoking status: Never     Passive exposure: Never    Smokeless tobacco: Never   Substance Use Topics    Alcohol use: Yes     Alcohol/week: 2.0 standard drinks of alcohol     Types: 2 Standard drinks or equivalent per week     Comment: occasional      Family History   Problem Relation Age of Onset    Cancer Father         ocular melanoma,  of metastatic diseas age 81    Hypertension Father     Lipids Father      "Prostate Cancer Father         onset age 70    Gastrointestinal Disease Father         \"sensitive stomach\"    Skin Cancer Father     Other Cancer Father         Skin Cancer    Gastrointestinal Disease Mother         cholecystectomy    Eye Disorder Mother         cataract, macular degeneration    Cerebrovascular Disease Mother     C.A.D. Maternal Grandmother          of MI age 83    C.A.D. Maternal Grandfather          MI early 60's    C.A.D. Paternal Grandfather          early 50s MI    Hypertension Brother     Hypertension Brother     Hypertension Brother     Other Cancer Brother     Breast Cancer No family hx of          Current Outpatient Medications   Medication Sig Dispense Refill    venlafaxine (EFFEXOR-ER) 37.5 MG 24 hr tablet Take 3 tablets (112.5 mg) by mouth daily. 90 tablet 4     Allergies   Allergen Reactions    Other [No Clinical Screening - See Comments] Rash     metal         Review of Systems  CONSTITUTIONAL: NEGATIVE for fever, chills, change in weight  INTEGUMENTARY/SKIN: NEGATIVE for worrisome rashes, moles or lesions  EYES: NEGATIVE for vision changes or irritation  ENT/MOUTH: NEGATIVE for ear, mouth and throat problems  RESP: NEGATIVE for significant cough or SOB  BREAST: NEGATIVE for masses, tenderness or discharge  CV: NEGATIVE for chest pain, palpitations or peripheral edema  GI: NEGATIVE for nausea, abdominal pain, heartburn, or change in bowel habits  : NEGATIVE for frequency, dysuria, or hematuria  MUSCULOSKELETAL: NEGATIVE for significant arthralgias or myalgia  NEURO: NEGATIVE for weakness, dizziness or paresthesias  ENDOCRINE: NEGATIVE for temperature intolerance, skin/hair changes  HEME: NEGATIVE for bleeding problems  PSYCHIATRIC: NEGATIVE for changes in mood or affect     Objective    Exam  /72   Pulse 73   Temp 97.1  F (36.2  C) (Temporal)   Ht 1.646 m (5' 4.8\")   Wt 72.1 kg (159 lb)   LMP 10/15/2018 (Approximate)   SpO2 98%   BMI 26.62 kg/m     Estimated " "body mass index is 26.62 kg/m  as calculated from the following:    Height as of this encounter: 1.646 m (5' 4.8\").    Weight as of this encounter: 72.1 kg (159 lb).    Physical Exam  GENERAL: alert and no distress  EYES: Eyes grossly normal to inspection, PERRL and conjunctivae and sclerae normal  HENT: ear canals and TM's normal, nose and mouth without ulcers or lesions  NECK: no adenopathy, no asymmetry, masses, or scars  RESP: lungs clear to auscultation - no rales, rhonchi or wheezes  BREAST: normal without masses, tenderness or nipple discharge and no palpable axillary masses or adenopathy  CV: regular rate and rhythm, normal S1 S2, no S3 or S4, no murmur, click or rub, no peripheral edema  ABDOMEN: soft, nontender, no hepatosplenomegaly, no masses and bowel sounds normal  MS: no gross musculoskeletal defects noted, no edema  SKIN: no suspicious lesions or rashes  NEURO: Normal strength and tone, mentation intact and speech normal  PSYCH: mentation appears normal, affect normal/bright        Signed Electronically by: Maribel Serna MD    "

## 2025-07-22 LAB
ALBUMIN SERPL BCG-MCNC: 4.6 G/DL (ref 3.5–5.2)
ALP SERPL-CCNC: 75 U/L (ref 40–150)
ALT SERPL W P-5'-P-CCNC: 14 U/L (ref 0–50)
ANION GAP SERPL CALCULATED.3IONS-SCNC: 11 MMOL/L (ref 7–15)
AST SERPL W P-5'-P-CCNC: 21 U/L (ref 0–45)
BILIRUB SERPL-MCNC: 0.6 MG/DL
BUN SERPL-MCNC: 19.3 MG/DL (ref 8–23)
CALCIUM SERPL-MCNC: 9.9 MG/DL (ref 8.8–10.4)
CHLORIDE SERPL-SCNC: 101 MMOL/L (ref 98–107)
CHOLEST SERPL-MCNC: 271 MG/DL
CREAT SERPL-MCNC: 1.18 MG/DL (ref 0.51–0.95)
CREAT UR-MCNC: 49.5 MG/DL
EGFRCR SERPLBLD CKD-EPI 2021: 53 ML/MIN/1.73M2
FASTING STATUS PATIENT QL REPORTED: YES
FASTING STATUS PATIENT QL REPORTED: YES
GLUCOSE SERPL-MCNC: 88 MG/DL (ref 70–99)
HCO3 SERPL-SCNC: 26 MMOL/L (ref 22–29)
HDLC SERPL-MCNC: 62 MG/DL
LDLC SERPL CALC-MCNC: 182 MG/DL
MICROALBUMIN UR-MCNC: <12 MG/L
MICROALBUMIN/CREAT UR: NORMAL MG/G{CREAT}
NONHDLC SERPL-MCNC: 209 MG/DL
POTASSIUM SERPL-SCNC: 4 MMOL/L (ref 3.4–5.3)
PROT SERPL-MCNC: 7.6 G/DL (ref 6.4–8.3)
SODIUM SERPL-SCNC: 138 MMOL/L (ref 135–145)
TRIGL SERPL-MCNC: 134 MG/DL
TSH SERPL DL<=0.005 MIU/L-ACNC: 1.33 UIU/ML (ref 0.3–4.2)